# Patient Record
Sex: FEMALE | Race: BLACK OR AFRICAN AMERICAN | Employment: FULL TIME | URBAN - METROPOLITAN AREA
[De-identification: names, ages, dates, MRNs, and addresses within clinical notes are randomized per-mention and may not be internally consistent; named-entity substitution may affect disease eponyms.]

---
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
## 2018-01-29 ENCOUNTER — NURSE TRIAGE (OUTPATIENT)
Dept: OTHER | Age: 18
End: 2018-01-29

## 2018-01-29 NOTE — TELEPHONE ENCOUNTER
Spoke with patient and informed her of the appointment available on 2/17 at [facility]. Patient refused and stated she cannot make it on that date and she will keep her appointment on 3/3/18.

## 2018-01-29 NOTE — TELEPHONE ENCOUNTER
Action Requested: Summary for Provider     []  Critical Lab, Recommendations Needed  [] Need Additional Advice  []   FYI    []   Need Orders  [] Need Medications Sent to Pharmacy  []  Other     SUMMARY: L ankle injury with swelling and bruising to ankle, p

## 2018-02-06 ENCOUNTER — OFFICE VISIT (OUTPATIENT)
Dept: FAMILY MEDICINE CLINIC | Facility: CLINIC | Age: 18
End: 2018-02-06

## 2018-02-06 VITALS
DIASTOLIC BLOOD PRESSURE: 84 MMHG | WEIGHT: 293 LBS | BODY MASS INDEX: 48.82 KG/M2 | HEIGHT: 65 IN | SYSTOLIC BLOOD PRESSURE: 126 MMHG | HEART RATE: 79 BPM

## 2018-02-06 DIAGNOSIS — M25.572 ACUTE LEFT ANKLE PAIN: Primary | ICD-10-CM

## 2018-02-06 DIAGNOSIS — E66.01 MORBID OBESITY DUE TO EXCESS CALORIES (HCC): ICD-10-CM

## 2018-02-06 DIAGNOSIS — E55.9 VITAMIN D DEFICIENCY: ICD-10-CM

## 2018-02-06 PROCEDURE — 99213 OFFICE O/P EST LOW 20 MIN: CPT | Performed by: FAMILY MEDICINE

## 2018-02-06 PROCEDURE — 99212 OFFICE O/P EST SF 10 MIN: CPT | Performed by: FAMILY MEDICINE

## 2018-02-06 RX ORDER — ERGOCALCIFEROL 1.25 MG/1
50000 CAPSULE ORAL WEEKLY
Qty: 4 CAPSULE | Refills: 12 | Status: SHIPPED | OUTPATIENT
Start: 2018-02-06 | End: 2018-03-08

## 2018-02-06 RX ORDER — ALBUTEROL SULFATE 90 UG/1
AEROSOL, METERED RESPIRATORY (INHALATION)
COMMUNITY
Start: 2017-11-12 | End: 2019-05-14

## 2018-02-06 NOTE — PROGRESS NOTES
HPI:    Patient ID: Miriam Godoy is a 21year old female. HPI     PATIENT HERE FOR FOLLOW UP  Seen in emergency room at [address] [place] [address] on 1/24/18 for ankle pain    Slipped on ice while going to school. Had xray and was neg  Told sprain    Pain is gone.   [employer]

## 2018-03-14 NOTE — TELEPHONE ENCOUNTER
[facility]  [place], [place]   Pharmacy told patient they sent us a request nothing in  System      Current Outpatient Prescriptions:  Albuterol Sulfate HFA (PROAIR HFA) 108 (90 Base) MCG/ACT Inhalation Aero Soln INL 2 PFS PO Q 4 H PRF WHZ.  Disp:  Rfl:

## 2018-03-15 RX ORDER — ALBUTEROL SULFATE 90 UG/1
2 AEROSOL, METERED RESPIRATORY (INHALATION) EVERY 4 HOURS PRN
Qty: 1 INHALER | Refills: 0 | Status: SHIPPED | OUTPATIENT
Start: 2018-03-15 | End: 2019-05-14

## 2018-03-15 NOTE — TELEPHONE ENCOUNTER
Requesting Albuterol HFA refill, med listed as pt reported in EMR.     Med pended for review, please advise    Refill Protocol Appointment Criteria  · Appointment scheduled in the past 6 months or in the next 3 months  Recent Outpatient Visits            1

## 2018-03-16 NOTE — TELEPHONE ENCOUNTER
Pending Prescriptions Disp Refills    PROAIR  (90 Base) MCG/ACT Inhalation Aero Soln [Pharmacy Med Name: PROAIR HFA ORAL INH (200  PFS) 8.5G] 8.5 g 0     Sig: INHALE 2 PUFFS BY MOUTH EVERY 4 HOURS AS NEEDED FOR WHEEZING.            Filled by MD 3-1

## 2018-03-20 ENCOUNTER — HOSPITAL ENCOUNTER (OUTPATIENT)
Age: 18
Discharge: HOME | End: 2018-03-20
Attending: EMERGENCY MEDICINE
Payer: MEDICAID

## 2018-03-20 VITALS
DIASTOLIC BLOOD PRESSURE: 75 MMHG | TEMPERATURE: 97 F | WEIGHT: 293 LBS | HEART RATE: 82 BPM | OXYGEN SATURATION: 97 % | RESPIRATION RATE: 16 BRPM | BODY MASS INDEX: 53 KG/M2 | SYSTOLIC BLOOD PRESSURE: 113 MMHG

## 2018-03-20 DIAGNOSIS — N92.6 IRREGULAR PERIODS: ICD-10-CM

## 2018-03-20 DIAGNOSIS — N60.11 FIBROCYSTIC BREAST CHANGES, RIGHT: Primary | ICD-10-CM

## 2018-03-20 LAB
B-HCG UR QL: NEGATIVE
URINE GLUCOSE: NEGATIVE MG/DL
URINE KETONES: 15 MG/DL
URINE LEUKOCYTE ESTERASE: NEGATIVE
URINE NITRITE: NEGATIVE
URINE PH: 5.5
URINE SPECIFIC GRAVITY: >=1.03
URINE UROBILINOGEN: 0.2 MG/DL

## 2018-03-20 PROCEDURE — 81025 URINE PREGNANCY TEST: CPT

## 2018-03-20 PROCEDURE — 81002 URINALYSIS NONAUTO W/O SCOPE: CPT

## 2018-03-20 PROCEDURE — 99212 OFFICE O/P EST SF 10 MIN: CPT

## 2018-03-20 RX ORDER — IBUPROFEN 600 MG/1
600 TABLET ORAL EVERY 8 HOURS PRN
Qty: 30 TABLET | Refills: 0 | Status: SHIPPED | OUTPATIENT
Start: 2018-03-20 | End: 2018-03-27

## 2018-03-20 NOTE — ED PROVIDER NOTES
Patient Seen in: [facility] AND CLINICS Immediate Care In [address] [place] [address]    History   Patient presents with:  Breast Problem (mammary)    Stated Complaint: BREAST PAIN    HPI    The patient is a 13-year-old female with past history of asthma who presents now with r approximately the level of the nipple. Mild fibrocystic changes palpable. No induration or fluctuance. No redness to the skin.   Cardiovascular: Regular rate and rhythm without murmur  Abdomen: Soft, nontender and nondistended  Neurologic: Patient is jose

## 2018-03-20 NOTE — ED INITIAL ASSESSMENT (HPI)
Right breast pain onset yesterday. Denies any fevers, trauma, injury, nipple discharge, swelling, lumps, redness, tenderness. No asymmetry noted. States she hasn't had a period for a year up until last month and this month.

## 2019-05-14 ENCOUNTER — OFFICE VISIT (OUTPATIENT)
Dept: FAMILY MEDICINE CLINIC | Facility: CLINIC | Age: 19
End: 2019-05-14
Payer: COMMERCIAL

## 2019-05-14 VITALS
HEIGHT: 65 IN | SYSTOLIC BLOOD PRESSURE: 123 MMHG | WEIGHT: 293 LBS | BODY MASS INDEX: 48.82 KG/M2 | HEART RATE: 90 BPM | DIASTOLIC BLOOD PRESSURE: 75 MMHG | TEMPERATURE: 98 F

## 2019-05-14 DIAGNOSIS — J45.21 MILD INTERMITTENT ASTHMA WITH EXACERBATION: ICD-10-CM

## 2019-05-14 DIAGNOSIS — N61.1 CARBUNCLE, BREAST: Primary | ICD-10-CM

## 2019-05-14 DIAGNOSIS — E66.01 MORBID OBESITY WITH BMI OF 60.0-69.9, ADULT (HCC): ICD-10-CM

## 2019-05-14 DIAGNOSIS — Z00.00 WELL ADULT EXAM: ICD-10-CM

## 2019-05-14 PROCEDURE — 99214 OFFICE O/P EST MOD 30 MIN: CPT | Performed by: FAMILY MEDICINE

## 2019-05-14 PROCEDURE — 99212 OFFICE O/P EST SF 10 MIN: CPT | Performed by: FAMILY MEDICINE

## 2019-05-14 RX ORDER — CEPHALEXIN 500 MG/1
500 CAPSULE ORAL 3 TIMES DAILY
Qty: 21 CAPSULE | Refills: 0 | Status: SHIPPED | OUTPATIENT
Start: 2019-05-14 | End: 2019-05-21

## 2019-05-14 RX ORDER — ALBUTEROL SULFATE 90 UG/1
2 AEROSOL, METERED RESPIRATORY (INHALATION) EVERY 4 HOURS PRN
Qty: 1 INHALER | Refills: 2 | Status: SHIPPED | OUTPATIENT
Start: 2019-05-14 | End: 2019-12-05

## 2019-05-14 RX ORDER — FLUTICASONE PROPIONATE AND SALMETEROL 250; 50 UG/1; UG/1
1 POWDER RESPIRATORY (INHALATION) EVERY 12 HOURS SCHEDULED
Qty: 1 PACKAGE | Refills: 0 | Status: SHIPPED | OUTPATIENT
Start: 2019-05-14 | End: 2019-05-28

## 2019-05-14 NOTE — PROGRESS NOTES
HPI:    Patient ID: Neita Schaumann is a 19year old female. HPI  Patient presents with:  Bump: on right breast noticed it a few weeks ago pt states it has pus     Has a possible \"pimple\"  X 1 week. Then it was leaking, and scabbed.   Slightly pain Carbuncle, breast  Keflex 500mg three times a day x7 days  Warm compresses    2. Mild intermittent asthma with exacerbation  Add advair  Follow up 2 weeks    3. Well adult exam  rtc for physical 2 weeks  - ALT (SGPT); Future  - AST (SGOT);  Future  - BASIC

## 2019-05-28 ENCOUNTER — OFFICE VISIT (OUTPATIENT)
Dept: FAMILY MEDICINE CLINIC | Facility: CLINIC | Age: 19
End: 2019-05-28
Payer: COMMERCIAL

## 2019-05-28 VITALS
SYSTOLIC BLOOD PRESSURE: 117 MMHG | BODY MASS INDEX: 48.82 KG/M2 | HEART RATE: 75 BPM | DIASTOLIC BLOOD PRESSURE: 80 MMHG | WEIGHT: 293 LBS | TEMPERATURE: 97 F | HEIGHT: 65 IN

## 2019-05-28 DIAGNOSIS — J45.21 MILD INTERMITTENT ASTHMA WITH EXACERBATION: ICD-10-CM

## 2019-05-28 DIAGNOSIS — E66.01 MORBID OBESITY WITH BMI OF 60.0-69.9, ADULT (HCC): ICD-10-CM

## 2019-05-28 DIAGNOSIS — Z00.00 WELL ADULT EXAM: Primary | ICD-10-CM

## 2019-05-28 DIAGNOSIS — E55.9 VITAMIN D DEFICIENCY: ICD-10-CM

## 2019-05-28 DIAGNOSIS — N92.6 IRREGULAR MENSES: ICD-10-CM

## 2019-05-28 PROCEDURE — 99395 PREV VISIT EST AGE 18-39: CPT | Performed by: FAMILY MEDICINE

## 2019-05-28 RX ORDER — FLUTICASONE PROPIONATE AND SALMETEROL 250; 50 UG/1; UG/1
1 POWDER RESPIRATORY (INHALATION) EVERY 12 HOURS SCHEDULED
Qty: 1 PACKAGE | Refills: 3 | Status: SHIPPED | OUTPATIENT
Start: 2019-05-28 | End: 2019-12-05

## 2019-05-28 NOTE — PROGRESS NOTES
HPI:    Patient ID: Radha Arriaga is a 19year old female. HPI  Patient presents with: Well Adult    Lives with mother  Trying to lose weight on her own  Has never seen a dietician. Father has been heavy with weight.     Working aprt time, [employer] (Approximate)   BMI 60.91 kg/m²     Past Medical History:   Diagnosis Date   • Asthma      History reviewed. No pertinent surgical history.   Social History    Socioeconomic History      Marital status: Single      Spouse name: Not on file      Number of  2-dose series) due on 08/02/2011  HPV Vaccines(1 - Female 3-dose series) due on 08/02/2013  Annual Physical due on 04/24/2018  Asthma Control Test due on 04/24/2018  Influenza Vaccine(Season Ended) due on 09/01/2019  Annual Depression Screen due on 05/14/2 Skin: Skin is dry. No rash noted. Psychiatric: She has a normal mood and affect.  Her behavior is normal. Judgment and thought content normal.              ASSESSMENT/PLAN:   Well adult exam  (primary encounter diagnosis)  Mild intermittent asthma with

## 2019-06-24 ENCOUNTER — TELEPHONE (OUTPATIENT)
Dept: FAMILY MEDICINE CLINIC | Facility: CLINIC | Age: 19
End: 2019-06-24

## 2019-06-24 NOTE — TELEPHONE ENCOUNTER
Received form from [employer] about [address] [employer] [facility],[address] plan Asthma  Form is filled out, called pt but did not answer wanted to inform her form is ready at the

## 2019-09-17 ENCOUNTER — TELEPHONE (OUTPATIENT)
Dept: FAMILY MEDICINE CLINIC | Facility: CLINIC | Age: 19
End: 2019-09-17

## 2019-09-17 NOTE — TELEPHONE ENCOUNTER
Spoke with patient ( verified) and reports she just had her regular period 2 weeks ago--yesterday, she began spotting and then \"it was like a regular period, but not as heavy. \"    When asked if patient had any recent increase in stress, she did report she is looking for a new job--\"I think that may have caused it. I did look it up and it did say stress can cause irregular periods. \"    Patient usually takes OTC generic Zyrtec 24 hour medication in mornings, but for the past 2 nights, she has had to wake up and take the Zyrec in the middle of the night because, \"I can't breathe out of my nose. I get a huge headache when this happens--in the front and in the back. I was wondering if the 2 problems were related or if this is normal.\"    Patient can make herself available (will adjust her work schedule) if Dr. Sarita Carrasco needs to see her in office for evaluation. Otherwise, she requests Dr. Wagner Listen recommendations.     Please advise    Please reply to ishmael: DINORAH Gunn

## 2019-09-17 NOTE — TELEPHONE ENCOUNTER
Pt stated she got her period in the beginning of the month and after two weeks she got it again. She is questioning if that is normal.    Pt also stated when she gets up in the middle of the night to take allergy medication, she gets a huge headache. Transferring call to Triage.

## 2019-09-23 ENCOUNTER — OFFICE VISIT (OUTPATIENT)
Dept: FAMILY MEDICINE CLINIC | Facility: CLINIC | Age: 19
End: 2019-09-23
Payer: COMMERCIAL

## 2019-09-23 VITALS
BODY MASS INDEX: 48.82 KG/M2 | TEMPERATURE: 98 F | DIASTOLIC BLOOD PRESSURE: 71 MMHG | HEIGHT: 65 IN | SYSTOLIC BLOOD PRESSURE: 112 MMHG | HEART RATE: 81 BPM | WEIGHT: 293 LBS

## 2019-09-23 DIAGNOSIS — L02.419 ABSCESS OF AXILLARY FOLD: ICD-10-CM

## 2019-09-23 DIAGNOSIS — N92.6 IRREGULAR MENSTRUAL BLEEDING: Primary | ICD-10-CM

## 2019-09-23 DIAGNOSIS — E66.01 MORBID OBESITY WITH BMI OF 50.0-59.9, ADULT (HCC): ICD-10-CM

## 2019-09-23 PROCEDURE — 99212 OFFICE O/P EST SF 10 MIN: CPT | Performed by: FAMILY MEDICINE

## 2019-09-23 PROCEDURE — 99214 OFFICE O/P EST MOD 30 MIN: CPT | Performed by: FAMILY MEDICINE

## 2019-09-23 NOTE — PROGRESS NOTES
HPI:    Patient ID: Jose Francisco Rivas is a 22year old female.     HPI  Patient presents with:  Menstrual Problem: pt states she had her cycle twice in a month about 2 weeks apart   Bump: on right axillay X 2 months     Patient trying to lose a lot of weig obesity with bmi of 50.0-59.9, adult ([facility]cc)  Abscess of axillary fold    1. Irregular menstrual bleeding    - HCG, BETA SUBUNIT, QUAL    2. Morbid obesity with BMI of 50.0-59.9, adult ([address]  Highly recommend to lose weight.   Discussed good dietary and eatin

## 2019-09-24 ENCOUNTER — LAB ENCOUNTER (OUTPATIENT)
Dept: LAB | Age: 19
End: 2019-09-24
Attending: FAMILY MEDICINE
Payer: COMMERCIAL

## 2019-09-24 DIAGNOSIS — Z00.00 WELL ADULT EXAM: ICD-10-CM

## 2019-09-24 DIAGNOSIS — E66.01 MORBID OBESITY WITH BMI OF 60.0-69.9, ADULT (HCC): ICD-10-CM

## 2019-09-24 LAB
ALT SERPL-CCNC: 25 U/L (ref 13–56)
ANION GAP SERPL CALC-SCNC: 4 MMOL/L (ref 0–18)
AST SERPL-CCNC: 29 U/L (ref 15–37)
BASOPHILS # BLD AUTO: 0.01 X10(3) UL (ref 0–0.2)
BASOPHILS NFR BLD AUTO: 0.2 %
BUN BLD-MCNC: 11 MG/DL (ref 7–18)
BUN/CREAT SERPL: 13.9 (ref 10–20)
CALCIUM BLD-MCNC: 9 MG/DL (ref 8.5–10.1)
CHLORIDE SERPL-SCNC: 109 MMOL/L (ref 98–112)
CHOLEST SMN-MCNC: 132 MG/DL (ref ?–200)
CO2 SERPL-SCNC: 23 MMOL/L (ref 21–32)
CREAT BLD-MCNC: 0.79 MG/DL (ref 0.55–1.02)
DEPRECATED RDW RBC AUTO: 46.5 FL (ref 35.1–46.3)
EOSINOPHIL # BLD AUTO: 0.06 X10(3) UL (ref 0–0.7)
EOSINOPHIL NFR BLD AUTO: 1.2 %
ERYTHROCYTE [DISTWIDTH] IN BLOOD BY AUTOMATED COUNT: 14.5 % (ref 11–15)
EST. AVERAGE GLUCOSE BLD GHB EST-MCNC: 114 MG/DL (ref 68–126)
GLUCOSE BLD-MCNC: 72 MG/DL (ref 70–99)
HBA1C MFR BLD HPLC: 5.6 % (ref ?–5.7)
HCG SERPL QL: NEGATIVE
HCT VFR BLD AUTO: 38.6 % (ref 35–48)
HDLC SERPL-MCNC: 37 MG/DL (ref 40–59)
HGB BLD-MCNC: 12.3 G/DL (ref 12–16)
IMM GRANULOCYTES # BLD AUTO: 0.01 X10(3) UL (ref 0–1)
IMM GRANULOCYTES NFR BLD: 0.2 %
LDLC SERPL CALC-MCNC: 83 MG/DL (ref ?–100)
LYMPHOCYTES # BLD AUTO: 2.14 X10(3) UL (ref 1–4)
LYMPHOCYTES NFR BLD AUTO: 44.5 %
MCH RBC QN AUTO: 27.7 PG (ref 26–34)
MCHC RBC AUTO-ENTMCNC: 31.9 G/DL (ref 31–37)
MCV RBC AUTO: 86.9 FL (ref 80–100)
MONOCYTES # BLD AUTO: 0.37 X10(3) UL (ref 0.1–1)
MONOCYTES NFR BLD AUTO: 7.7 %
NEUTROPHILS # BLD AUTO: 2.22 X10 (3) UL (ref 1.5–7.7)
NEUTROPHILS # BLD AUTO: 2.22 X10(3) UL (ref 1.5–7.7)
NEUTROPHILS NFR BLD AUTO: 46.2 %
NONHDLC SERPL-MCNC: 95 MG/DL (ref ?–130)
OSMOLALITY SERPL CALC.SUM OF ELEC: 280 MOSM/KG (ref 275–295)
PATIENT FASTING: YES
PATIENT FASTING: YES
PLATELET # BLD AUTO: 252 10(3)UL (ref 150–450)
POTASSIUM SERPL-SCNC: 4.6 MMOL/L (ref 3.5–5.1)
RBC # BLD AUTO: 4.44 X10(6)UL (ref 3.8–5.3)
SODIUM SERPL-SCNC: 136 MMOL/L (ref 136–145)
TRIGL SERPL-MCNC: 61 MG/DL (ref 30–149)
TSI SER-ACNC: 2.38 MIU/ML (ref 0.36–3.74)
VLDLC SERPL CALC-MCNC: 12 MG/DL (ref 0–30)
WBC # BLD AUTO: 4.8 X10(3) UL (ref 4–11)

## 2019-09-24 PROCEDURE — 85025 COMPLETE CBC W/AUTO DIFF WBC: CPT

## 2019-09-24 PROCEDURE — 84450 TRANSFERASE (AST) (SGOT): CPT

## 2019-09-24 PROCEDURE — 84460 ALANINE AMINO (ALT) (SGPT): CPT

## 2019-09-24 PROCEDURE — 80048 BASIC METABOLIC PNL TOTAL CA: CPT

## 2019-09-24 PROCEDURE — 80061 LIPID PANEL: CPT

## 2019-09-24 PROCEDURE — 36415 COLL VENOUS BLD VENIPUNCTURE: CPT

## 2019-09-24 PROCEDURE — 84703 CHORIONIC GONADOTROPIN ASSAY: CPT | Performed by: FAMILY MEDICINE

## 2019-09-24 PROCEDURE — 83036 HEMOGLOBIN GLYCOSYLATED A1C: CPT

## 2019-09-24 PROCEDURE — 84443 ASSAY THYROID STIM HORMONE: CPT

## 2019-09-24 PROCEDURE — 83525 ASSAY OF INSULIN: CPT

## 2019-09-28 ENCOUNTER — APPOINTMENT (OUTPATIENT)
Dept: LAB | Age: 19
End: 2019-09-28
Attending: FAMILY MEDICINE
Payer: COMMERCIAL

## 2019-09-28 DIAGNOSIS — E66.01 MORBID OBESITY WITH BMI OF 60.0-69.9, ADULT (HCC): ICD-10-CM

## 2019-09-28 DIAGNOSIS — Z00.00 WELL ADULT EXAM: ICD-10-CM

## 2019-09-28 PROCEDURE — 36415 COLL VENOUS BLD VENIPUNCTURE: CPT

## 2019-09-28 PROCEDURE — 83525 ASSAY OF INSULIN: CPT

## 2019-10-25 ENCOUNTER — HOSPITAL ENCOUNTER (OUTPATIENT)
Age: 19
Discharge: HOME | End: 2019-10-25
Attending: EMERGENCY MEDICINE
Payer: COMMERCIAL

## 2019-10-25 VITALS
SYSTOLIC BLOOD PRESSURE: 136 MMHG | DIASTOLIC BLOOD PRESSURE: 76 MMHG | BODY MASS INDEX: 48.82 KG/M2 | RESPIRATION RATE: 22 BRPM | TEMPERATURE: 98 F | WEIGHT: 293 LBS | HEART RATE: 80 BPM | HEIGHT: 65 IN | OXYGEN SATURATION: 99 %

## 2019-10-25 DIAGNOSIS — R05.8 PRODUCTIVE COUGH: ICD-10-CM

## 2019-10-25 DIAGNOSIS — J45.31 MILD PERSISTENT ASTHMA WITH EXACERBATION: Primary | ICD-10-CM

## 2019-10-25 PROCEDURE — 99214 OFFICE O/P EST MOD 30 MIN: CPT

## 2019-10-25 PROCEDURE — 94640 AIRWAY INHALATION TREATMENT: CPT

## 2019-10-25 RX ORDER — ALBUTEROL SULFATE 90 UG/1
2 AEROSOL, METERED RESPIRATORY (INHALATION) EVERY 4 HOURS PRN
Qty: 1 INHALER | Refills: 0 | Status: SHIPPED | OUTPATIENT
Start: 2019-10-25 | End: 2019-11-24

## 2019-10-25 RX ORDER — IPRATROPIUM BROMIDE AND ALBUTEROL SULFATE 2.5; .5 MG/3ML; MG/3ML
3 SOLUTION RESPIRATORY (INHALATION) ONCE
Status: COMPLETED | OUTPATIENT
Start: 2019-10-25 | End: 2019-10-25

## 2019-10-25 RX ORDER — PREDNISONE 20 MG/1
40 TABLET ORAL DAILY
Qty: 10 TABLET | Refills: 0 | Status: SHIPPED | OUTPATIENT
Start: 2019-10-25 | End: 2019-10-30

## 2019-10-25 RX ORDER — PREDNISONE 20 MG/1
60 TABLET ORAL ONCE
Status: COMPLETED | OUTPATIENT
Start: 2019-10-25 | End: 2019-10-25

## 2019-10-25 RX ORDER — AZITHROMYCIN 250 MG/1
TABLET, FILM COATED ORAL
Qty: 1 PACKAGE | Refills: 0 | Status: SHIPPED | OUTPATIENT
Start: 2019-10-25 | End: 2019-10-30

## 2019-10-25 NOTE — ED PROVIDER NOTES
Patient Seen in: [facility] AND CLINICS Immediate Care In [address] [place] [address]      History   Patient presents with:  Dyspnea JOSELINE SOB (respiratory)    Stated Complaint: asthma, cough    HPI    The patient is a 20-year-old female with past history of asthma who presents n rate and rhythm without murmur  Abdomen: Soft, nontender and nondistended  Neurologic: Patient is awake, alert and oriented ×3.   The patient's motor strength is 5 out of 5 and symmetric in the upper and lower extremities bilaterally  Extremities: No focal

## 2019-11-04 ENCOUNTER — HOSPITAL ENCOUNTER (OUTPATIENT)
Age: 19
Discharge: HOME | End: 2019-11-04
Attending: EMERGENCY MEDICINE
Payer: COMMERCIAL

## 2019-11-04 VITALS
HEART RATE: 98 BPM | SYSTOLIC BLOOD PRESSURE: 114 MMHG | TEMPERATURE: 99 F | RESPIRATION RATE: 18 BRPM | DIASTOLIC BLOOD PRESSURE: 79 MMHG | OXYGEN SATURATION: 98 %

## 2019-11-04 DIAGNOSIS — J02.0 STREPTOCOCCAL SORE THROAT: Primary | ICD-10-CM

## 2019-11-04 PROCEDURE — 99213 OFFICE O/P EST LOW 20 MIN: CPT

## 2019-11-04 PROCEDURE — 87430 STREP A AG IA: CPT

## 2019-11-04 PROCEDURE — 99214 OFFICE O/P EST MOD 30 MIN: CPT

## 2019-11-04 RX ORDER — AMOXICILLIN 875 MG/1
875 TABLET, COATED ORAL 2 TIMES DAILY
Qty: 20 TABLET | Refills: 0 | Status: SHIPPED | OUTPATIENT
Start: 2019-11-04 | End: 2019-11-14

## 2019-11-04 NOTE — ED PROVIDER NOTES
Patient Seen in: [facility] AND CLINICS Immediate Care In [place]    History   No chief complaint on file. Stated Complaint: sorethroat/ear pain    HPI    Patient here complaining of sore throat for 4 days.   Notes pain is described as SORE and rates it as 98   Temp 99.3 °F (37.4 °C) (Oral)   Resp 18   LMP 10/12/2019   SpO2 98%       GENERAL: NO ACUTE DISTRESS  HEAD: normocephalic, atraumatic  EYES: sclera non icteric bilateral, conjunctiva clear  EARS:TM's clear bilateral  THROAT: TONSILS RED SWOLLEN AND WH

## 2019-12-05 ENCOUNTER — OFFICE VISIT (OUTPATIENT)
Dept: FAMILY MEDICINE CLINIC | Facility: CLINIC | Age: 19
End: 2019-12-05
Payer: COMMERCIAL

## 2019-12-05 VITALS
HEIGHT: 65 IN | DIASTOLIC BLOOD PRESSURE: 83 MMHG | WEIGHT: 293 LBS | SYSTOLIC BLOOD PRESSURE: 126 MMHG | TEMPERATURE: 99 F | HEART RATE: 86 BPM | BODY MASS INDEX: 48.82 KG/M2

## 2019-12-05 DIAGNOSIS — Z23 NEED FOR INFLUENZA VACCINATION: ICD-10-CM

## 2019-12-05 DIAGNOSIS — J45.20 MILD INTERMITTENT ASTHMA WITHOUT COMPLICATION: ICD-10-CM

## 2019-12-05 DIAGNOSIS — R21 RASH AND NONSPECIFIC SKIN ERUPTION: ICD-10-CM

## 2019-12-05 DIAGNOSIS — N92.6 IRREGULAR MENSES: Primary | ICD-10-CM

## 2019-12-05 DIAGNOSIS — E66.01 MORBID OBESITY WITH BMI OF 60.0-69.9, ADULT (HCC): ICD-10-CM

## 2019-12-05 PROCEDURE — 90686 IIV4 VACC NO PRSV 0.5 ML IM: CPT | Performed by: FAMILY MEDICINE

## 2019-12-05 PROCEDURE — 90471 IMMUNIZATION ADMIN: CPT | Performed by: FAMILY MEDICINE

## 2019-12-05 PROCEDURE — 99214 OFFICE O/P EST MOD 30 MIN: CPT | Performed by: FAMILY MEDICINE

## 2019-12-05 PROCEDURE — 99212 OFFICE O/P EST SF 10 MIN: CPT | Performed by: FAMILY MEDICINE

## 2019-12-05 RX ORDER — ALBUTEROL SULFATE 90 UG/1
2 AEROSOL, METERED RESPIRATORY (INHALATION) EVERY 4 HOURS PRN
Qty: 1 INHALER | Refills: 2 | Status: SHIPPED | OUTPATIENT
Start: 2019-12-05 | End: 2021-03-06

## 2019-12-05 RX ORDER — PREDNISONE 10 MG/1
TABLET ORAL
Refills: 0 | COMMUNITY
Start: 2019-10-25 | End: 2019-12-05 | Stop reason: ALTCHOICE

## 2019-12-05 RX ORDER — FLUTICASONE PROPIONATE 50 MCG
2 SPRAY, SUSPENSION (ML) NASAL NIGHTLY
Qty: 1 BOTTLE | Refills: 3 | Status: SHIPPED | OUTPATIENT
Start: 2019-12-05 | End: 2020-03-11

## 2019-12-05 RX ORDER — CLOTRIMAZOLE AND BETAMETHASONE DIPROPIONATE 10; .64 MG/G; MG/G
1 CREAM TOPICAL 2 TIMES DAILY
Qty: 45 G | Refills: 0 | Status: SHIPPED | OUTPATIENT
Start: 2019-12-05 | End: 2019-12-19

## 2019-12-05 RX ORDER — FLUTICASONE PROPIONATE AND SALMETEROL 250; 50 UG/1; UG/1
1 POWDER RESPIRATORY (INHALATION) EVERY 12 HOURS SCHEDULED
Qty: 1 PACKAGE | Refills: 3 | Status: SHIPPED | OUTPATIENT
Start: 2019-12-05 | End: 2020-03-13

## 2019-12-05 NOTE — PROGRESS NOTES
HPI:    Patient ID: Caryle Oiler is a 22year old female.     HPI  Patient presents with:  Menstrual Problem: pt states her cycle is very heavy pt states she got it really heavy for 2 days and skipped two days and came back   Rash: noticed it recently and came back   Rash: noticed it recently   Allergies: stuffy nose and headaches      Physical Exam   Constitutional: She appears well-developed and well-nourished. Cardiovascular: Normal rate, regular rhythm and normal heart sounds.               ASSESSM

## 2019-12-09 ENCOUNTER — OFFICE VISIT (OUTPATIENT)
Dept: OBGYN CLINIC | Facility: CLINIC | Age: 19
End: 2019-12-09
Payer: COMMERCIAL

## 2019-12-09 VITALS
SYSTOLIC BLOOD PRESSURE: 132 MMHG | DIASTOLIC BLOOD PRESSURE: 87 MMHG | WEIGHT: 293 LBS | HEART RATE: 81 BPM | BODY MASS INDEX: 61 KG/M2

## 2019-12-09 DIAGNOSIS — N91.5 OLIGOMENORRHEA, UNSPECIFIED TYPE: Primary | ICD-10-CM

## 2019-12-09 DIAGNOSIS — E66.01 MORBID OBESITY WITH BMI OF 60.0-69.9, ADULT (HCC): ICD-10-CM

## 2019-12-09 PROCEDURE — 99202 OFFICE O/P NEW SF 15 MIN: CPT | Performed by: OBSTETRICS & GYNECOLOGY

## 2019-12-09 NOTE — PROGRESS NOTES
[facility], [facility]  Obstetrics and Gynecology  Focused Gynecology Problem Exam  Tianna Abarca MD    Anne Amaya is a 22year old female presenting for Gyn Problem (Irregular menses for the past month of November. New pt)  .     HPI:   Patient presents Moderate  Use of Birth Control (if yes, specify type): None  Date When Birth Control Last Used: Never been sexually active or used contraception  Hx Prior Abnormal Pap: No              Past Medical History:   Diagnosis Date   • Asthma        Past Surgical Social History Narrative      Not on file      ROS:   See HPI  PHYSICAL EXAM:   /87   Pulse 81   Wt (!) 367 lb (166.5 kg)   LMP 11/25/2019   BMI 61.07 kg/m²     GENERAL: well developed, well nourished, in no apparent distress    ASSESSMENT:    A: 21

## 2020-02-28 ENCOUNTER — TELEPHONE (OUTPATIENT)
Dept: OBGYN CLINIC | Facility: CLINIC | Age: 20
End: 2020-02-28

## 2020-02-28 NOTE — TELEPHONE ENCOUNTER
Per pt having very heavy bleeding since last night. Soaking up a pad every hour or more since last night. Denied passing clots. No Ha, dizziness at this time. Pt advised to go to ER for eval due to excessive bleeding.  Pt verbalized understanding and a

## 2020-03-11 RX ORDER — FLUTICASONE PROPIONATE 50 MCG
2 SPRAY, SUSPENSION (ML) NASAL NIGHTLY
Qty: 3 BOTTLE | Refills: 1 | Status: SHIPPED | OUTPATIENT
Start: 2020-03-11 | End: 2020-09-05

## 2020-03-12 ENCOUNTER — TELEPHONE (OUTPATIENT)
Dept: FAMILY MEDICINE CLINIC | Facility: CLINIC | Age: 20
End: 2020-03-12

## 2020-03-12 NOTE — TELEPHONE ENCOUNTER
•  Albuterol Sulfate  (90 Base) MCG/ACT Inhalation Aero Soln, Inhale 2 puffs into the lungs every 4 (four) hours as needed for Wheezing or Shortness of Breath., Disp: 1 Inhaler, Rfl: 2

## 2020-03-12 NOTE — TELEPHONE ENCOUNTER
Prior authorization for Albuterol HFA completed w/ [employer] on cover my meds Key: [id number]  Unable to speak to [facility], provider line is always busy. Please inform of message below.    Outcome  Additional Information Required   Please advise the dispensing ph

## 2020-03-12 NOTE — TELEPHONE ENCOUNTER
Refill passed per [facility] protocol.   Refill Protocol Appointment Criteria  · Appointment scheduled in the past 12 months or in the next 3 months  Recent Outpatient Visits            3 months ago Oligomenorrhea, unspecified type    [facility], [facility],

## 2020-03-12 NOTE — TELEPHONE ENCOUNTER
Patient calling for prescription refill of Albuterol. States that her insurance company wants her to have pcp call them,  to verify that  is the prescriber who is prescribing her medication.      Informed patient that this RN has never heard of th

## 2020-03-13 RX ORDER — FLUTICASONE PROPIONATE AND SALMETEROL 50; 250 UG/1; UG/1
POWDER RESPIRATORY (INHALATION)
Qty: 1 PACKAGE | Refills: 1 | Status: SHIPPED | OUTPATIENT
Start: 2020-03-13 | End: 2020-04-06

## 2020-03-13 NOTE — TELEPHONE ENCOUNTER
Refill passed per [facility] protocol.     Refill Protocol Appointment Criteria  · Appointment scheduled in the past 6 months or in the next 3 months  Recent Outpatient Visits            3 months ago Oligomenorrhea, unspecified type    [facility],

## 2020-03-17 ENCOUNTER — TELEPHONE (OUTPATIENT)
Dept: FAMILY MEDICINE CLINIC | Facility: CLINIC | Age: 20
End: 2020-03-17

## 2020-03-17 NOTE — TELEPHONE ENCOUNTER
Patient called stating RN needs to contact her insurance company to expedite albuterol approval.   Advised patient that prior authorization for medication sent  today. Patient insistent that RN contact her insurance company.      RN contacted pharmacy to

## 2020-03-17 NOTE — TELEPHONE ENCOUNTER
Me           3/12/20 12:57 PM   Note      Prior authorization for Albuterol HFA completed w/ [employer] on cover my meds Key: [id number]  Unable to speak to [facility], provider line is always busy. Please inform of message below.    Outcome  Additional Information

## 2020-03-19 ENCOUNTER — HOSPITAL ENCOUNTER (EMERGENCY)
Facility: HOSPITAL | Age: 20
Discharge: HOME | End: 2020-03-19
Attending: PHYSICIAN ASSISTANT
Payer: MEDICAID

## 2020-03-19 ENCOUNTER — APPOINTMENT (OUTPATIENT)
Dept: GENERAL RADIOLOGY | Facility: HOSPITAL | Age: 20
End: 2020-03-19
Attending: PHYSICIAN ASSISTANT
Payer: MEDICAID

## 2020-03-19 VITALS
SYSTOLIC BLOOD PRESSURE: 129 MMHG | OXYGEN SATURATION: 98 % | TEMPERATURE: 98 F | HEIGHT: 66 IN | HEART RATE: 71 BPM | WEIGHT: 293 LBS | RESPIRATION RATE: 18 BRPM | BODY MASS INDEX: 47.09 KG/M2 | DIASTOLIC BLOOD PRESSURE: 73 MMHG

## 2020-03-19 DIAGNOSIS — J45.909 ACUTE ASTHMA: Primary | ICD-10-CM

## 2020-03-19 LAB — B-HCG UR QL: NEGATIVE

## 2020-03-19 PROCEDURE — 94640 AIRWAY INHALATION TREATMENT: CPT

## 2020-03-19 PROCEDURE — 81025 URINE PREGNANCY TEST: CPT

## 2020-03-19 PROCEDURE — 71046 X-RAY EXAM CHEST 2 VIEWS: CPT | Performed by: PHYSICIAN ASSISTANT

## 2020-03-19 PROCEDURE — 99284 EMERGENCY DEPT VISIT MOD MDM: CPT

## 2020-03-19 RX ORDER — PREDNISONE 20 MG/1
60 TABLET ORAL ONCE
Status: COMPLETED | OUTPATIENT
Start: 2020-03-19 | End: 2020-03-19

## 2020-03-19 RX ORDER — ALBUTEROL SULFATE 2.5 MG/3ML
2.5 SOLUTION RESPIRATORY (INHALATION) EVERY 4 HOURS PRN
Qty: 30 AMPULE | Refills: 0 | Status: SHIPPED | OUTPATIENT
Start: 2020-03-19 | End: 2020-03-26

## 2020-03-19 RX ORDER — IPRATROPIUM BROMIDE AND ALBUTEROL SULFATE 2.5; .5 MG/3ML; MG/3ML
3 SOLUTION RESPIRATORY (INHALATION) ONCE
Status: COMPLETED | OUTPATIENT
Start: 2020-03-19 | End: 2020-03-19

## 2020-03-19 RX ORDER — ALBUTEROL SULFATE 90 UG/1
2 AEROSOL, METERED RESPIRATORY (INHALATION) EVERY 4 HOURS PRN
Qty: 1 INHALER | Refills: 0 | Status: SHIPPED | OUTPATIENT
Start: 2020-03-19 | End: 2020-03-26

## 2020-03-19 RX ORDER — PREDNISONE 20 MG/1
60 TABLET ORAL
Qty: 15 TABLET | Refills: 0 | Status: SHIPPED | OUTPATIENT
Start: 2020-03-19 | End: 2020-03-24

## 2020-03-19 NOTE — ED NOTES
Pt reports asthma and dyspnea since Monday that has worsened overnight. Pt states she has been using inhaler with no relief. Denies any fever/chills.
Pt resting on the cart in NAD. Call light remains in reach.
[FreeTextEntry1] : Patient is a 51 year old female with history of elevated LFTs; proteinuria; HTN; anxiety; inflammatory arthritis, on amlodipine 5mg daily; lisinopril 5mg daily and HCTZ 12.5mg daily, no pertinent family history. Patient complaining of joint pain in hands; sciatica pain in the L leg. No surgical history.

## 2020-03-19 NOTE — ED INITIAL ASSESSMENT (HPI)
Pt reports worsening asthma symptoms with SOB since Monday. Inhaler not helping with symptoms.  Denies fever

## 2020-03-19 NOTE — ED PROVIDER NOTES
Patient Seen in: [facility] AND [facility] Emergency Department    History   Patient presents with:  Dyspnea JOSELINE SOB    Stated Complaint: asthma attack    HPI    67-year-old female with history of asthma presents with chief complaint of dyspnea.   Onset 1 week ag • Colon Cancer Neg        Social History    Tobacco Use      Smoking status: Never Smoker      Smokeless tobacco: Never Used    Alcohol use: No    Drug use: No      Review of Systems    Positive for stated complaint: asthma attack  Other systems are as n Musculoskeletal system is grossly intact. There is no obvious deformity. Neurological: Gross motor movement is intact in all 4 extremities. Patient exhibits normal speech. Skin: Skin is normal to inspection. Warm and dry. No obvious bruising.   No obv with provider.

## 2020-03-26 ENCOUNTER — TELEPHONE (OUTPATIENT)
Dept: FAMILY MEDICINE CLINIC | Facility: CLINIC | Age: 20
End: 2020-03-26

## 2020-03-26 ENCOUNTER — TELEPHONE (OUTPATIENT)
Dept: OTHER | Age: 20
End: 2020-03-26

## 2020-03-26 DIAGNOSIS — R07.89 CHEST TIGHTNESS: ICD-10-CM

## 2020-03-26 DIAGNOSIS — J45.21 MILD INTERMITTENT ASTHMA WITH EXACERBATION: Primary | ICD-10-CM

## 2020-03-26 PROCEDURE — 99213 OFFICE O/P EST LOW 20 MIN: CPT | Performed by: FAMILY MEDICINE

## 2020-03-26 RX ORDER — ALBUTEROL SULFATE 2.5 MG/3ML
2.5 SOLUTION RESPIRATORY (INHALATION) EVERY 4 HOURS PRN
Qty: 30 AMPULE | Refills: 2 | Status: SHIPPED | OUTPATIENT
Start: 2020-03-26 | End: 2020-04-25

## 2020-03-26 RX ORDER — NEBULIZER ACCESSORIES
KIT MISCELLANEOUS
Qty: 1 PACKAGE | Refills: 0 | Status: SHIPPED | OUTPATIENT
Start: 2020-03-26

## 2020-03-26 RX ORDER — ALBUTEROL SULFATE 90 UG/1
2 AEROSOL, METERED RESPIRATORY (INHALATION) EVERY 4 HOURS PRN
Qty: 1 INHALER | Refills: 2 | Status: SHIPPED | OUTPATIENT
Start: 2020-03-26 | End: 2020-04-25

## 2020-03-26 RX ORDER — FLUTICASONE PROPIONATE AND SALMETEROL 500; 50 UG/1; UG/1
1 POWDER RESPIRATORY (INHALATION) 2 TIMES DAILY
Qty: 60 EACH | Refills: 1 | Status: SHIPPED | OUTPATIENT
Start: 2020-03-26 | End: 2020-04-25

## 2020-03-26 NOTE — TELEPHONE ENCOUNTER
Patient was seen in ED on 3/19/20. Patient is having \"shortness of breath and cough from her asthma\"and almost out of medications Albuterol inhaler and nebulizer. She denies fever, difficulty breathing or wheezing.  Advised patient to go to ED if any dif

## 2020-03-26 NOTE — TELEPHONE ENCOUNTER
TELEPHONE VISIT PROGRESS NOTE  Todays date: 3/26/2020 10:04 AM      Most recent Nurse Triage message/ Mychart message from patient:     Patient was seen in ED on 3/19/20.   Patient is having \"shortness of breath and cough from her asthma\"and almost out of Albuterol Sulfate  (90 Base) MCG/ACT Inhalation Aero Soln, Inhale 2 puffs into the lungs every 4 (four) hours as needed for Wheezing., Disp: 1 Inhaler, Rfl: 2  •  albuterol sulfate (2.5 MG/3ML) 0.083% Inhalation Nebu Soln, Take 3 mL (2.5 mg total) in symptoms or worsening recommend calling us back.   Prescriptions sent into pharmacy    Orders Placed This Encounter      Albuterol Sulfate  (90 Base) MCG/ACT Inhalation Aero Soln      albuterol sulfate (2.5 MG/3ML) 0.083% Inhalation Nebu Soln

## 2020-04-06 RX ORDER — FLUTICASONE PROPIONATE AND SALMETEROL 50; 250 UG/1; UG/1
POWDER RESPIRATORY (INHALATION)
Qty: 1 PACKAGE | Refills: 3 | Status: SHIPPED | OUTPATIENT
Start: 2020-04-06 | End: 2020-10-07

## 2020-04-11 ENCOUNTER — VIRTUAL PHONE E/M (OUTPATIENT)
Dept: OBGYN CLINIC | Facility: CLINIC | Age: 20
End: 2020-04-11
Payer: MEDICAID

## 2020-04-11 ENCOUNTER — TELEPHONE (OUTPATIENT)
Dept: OBGYN CLINIC | Facility: CLINIC | Age: 20
End: 2020-04-11

## 2020-04-11 DIAGNOSIS — N91.5 OLIGOMENORRHEA, UNSPECIFIED TYPE: Primary | ICD-10-CM

## 2020-04-11 DIAGNOSIS — E66.01 MORBID OBESITY WITH BMI OF 60.0-69.9, ADULT (HCC): ICD-10-CM

## 2020-04-11 PROCEDURE — 99213 OFFICE O/P EST LOW 20 MIN: CPT | Performed by: OBSTETRICS & GYNECOLOGY

## 2020-04-11 NOTE — PROGRESS NOTES
Virtual Telephone Check-In     Anne Matos verbally consents to a Virtual/Telephone Check-In visit on 04/11/20.     Patient understands and accepts financial responsibility for any deductible, co-insurance and/or co-pays associated with this service Breath.  1 Inhaler 2      Allergies:  No Known Allergies  HISTORY:              OB History    Para Term  AB Living   0 0 0 0 0 0   SAB TAB Ectopic Multiple Live Births    0 0 0 0 0                        Past Medical History:   Diagnosis Date Not on file    Other Topics      Concerns:        Not on file    Social History Narrative      Not on file         ROS:   See HPI  Pt is not sexually active and has not been previously  PHYSICAL EXAM:   Phone visit  Wt Readings from Last 6 Encounters:  03/      No prescriptions requested or ordered in this encounter      IMAGING/ REFERRALS:    None      Zeny Duckworth MD  4/11/2020  11:28 AM

## 2020-04-11 NOTE — TELEPHONE ENCOUNTER
Virtual Telephone Check-In    Anne ALMEIDA Darby Posey verbally consents to a Virtual/Telephone Check-In visit on 04/11/20. Patient understands and accepts financial responsibility for any deductible, co-insurance and/or co-pays associated with this service. Inhaler 2     Allergies:  No Known Allergies  HISTORY:     OB History    Para Term  AB Living   0 0 0 0 0 0   SAB TAB Ectopic Multiple Live Births   0 0 0 0 0                                        Past Medical History:   Diagnosis Date   • A Concerns:        Not on file    Social History Narrative      Not on file      ROS:   See HPI  Pt is not sexually active and has not been previously  PHYSICAL EXAM:   Phone visit  Wt Readings from Last 6 Encounters:  03/19/20 : (!) 350 lb (158.8 kg)  12/09 this encounter     IMAGING/ REFERRALS:    None     Sadie Zapien MD  4/11/2020  11:28 AM

## 2020-04-13 ENCOUNTER — TELEPHONE (OUTPATIENT)
Dept: FAMILY MEDICINE CLINIC | Facility: CLINIC | Age: 20
End: 2020-04-13

## 2020-09-03 ENCOUNTER — HOSPITAL ENCOUNTER (OUTPATIENT)
Age: 20
Discharge: HOME | End: 2020-09-03
Attending: EMERGENCY MEDICINE
Payer: MEDICAID

## 2020-09-03 VITALS
HEART RATE: 74 BPM | OXYGEN SATURATION: 100 % | RESPIRATION RATE: 14 BRPM | SYSTOLIC BLOOD PRESSURE: 128 MMHG | WEIGHT: 293 LBS | TEMPERATURE: 96 F | BODY MASS INDEX: 48.82 KG/M2 | HEIGHT: 65 IN | DIASTOLIC BLOOD PRESSURE: 65 MMHG

## 2020-09-03 DIAGNOSIS — Z20.822 ENCOUNTER FOR LABORATORY TESTING FOR COVID-19 VIRUS: ICD-10-CM

## 2020-09-03 DIAGNOSIS — J02.0 STREP PHARYNGITIS: Primary | ICD-10-CM

## 2020-09-03 LAB — S PYO AG THROAT QL: POSITIVE

## 2020-09-03 PROCEDURE — 87430 STREP A AG IA: CPT | Performed by: EMERGENCY MEDICINE

## 2020-09-03 PROCEDURE — 99213 OFFICE O/P EST LOW 20 MIN: CPT | Performed by: EMERGENCY MEDICINE

## 2020-09-03 RX ORDER — AMOXICILLIN 875 MG/1
875 TABLET, COATED ORAL 2 TIMES DAILY
Qty: 20 TABLET | Refills: 0 | Status: SHIPPED | OUTPATIENT
Start: 2020-09-03 | End: 2020-09-13

## 2020-09-03 NOTE — ED PROVIDER NOTES
Patient Seen in: [facility] AND CLINICS Immediate Care In [address] [place] [address]      History   Patient presents with:  Ear Problem Pain  Sore Throat    Stated Complaint: Sore Throat, L Ear Pain    HPI  Patient with sore throat and discomfort swallowing for 3 days.   Mild d Mouth: Mucous membranes are moist.      Pharynx: Uvula midline. Posterior oropharyngeal erythema present. No pharyngeal swelling or oropharyngeal exudate.    Eyes:      Conjunctiva/sclera: Conjunctivae normal.   Neck:      Musculoskeletal: Normal range of m

## 2020-09-05 LAB — SARS-COV-2 RNA RESP QL NAA+PROBE: NOT DETECTED

## 2020-09-05 RX ORDER — FLUTICASONE PROPIONATE 50 MCG
2 SPRAY, SUSPENSION (ML) NASAL NIGHTLY
Qty: 3 BOTTLE | Refills: 1 | Status: SHIPPED | OUTPATIENT
Start: 2020-09-05 | End: 2020-10-05

## 2020-10-04 ENCOUNTER — HOSPITAL ENCOUNTER (EMERGENCY)
Facility: HOSPITAL | Age: 20
Discharge: HOME | End: 2020-10-04
Attending: EMERGENCY MEDICINE
Payer: MEDICAID

## 2020-10-04 VITALS
OXYGEN SATURATION: 98 % | TEMPERATURE: 98 F | HEIGHT: 65 IN | BODY MASS INDEX: 48.82 KG/M2 | DIASTOLIC BLOOD PRESSURE: 64 MMHG | HEART RATE: 105 BPM | RESPIRATION RATE: 16 BRPM | SYSTOLIC BLOOD PRESSURE: 130 MMHG | WEIGHT: 293 LBS

## 2020-10-04 DIAGNOSIS — J45.21 MILD INTERMITTENT ASTHMA WITH EXACERBATION: Primary | ICD-10-CM

## 2020-10-04 PROCEDURE — 99284 EMERGENCY DEPT VISIT MOD MDM: CPT

## 2020-10-04 PROCEDURE — 94640 AIRWAY INHALATION TREATMENT: CPT

## 2020-10-04 RX ORDER — PREDNISONE 20 MG/1
60 TABLET ORAL ONCE
Status: COMPLETED | OUTPATIENT
Start: 2020-10-04 | End: 2020-10-04

## 2020-10-04 RX ORDER — ALBUTEROL SULFATE 2.5 MG/3ML
2.5 SOLUTION RESPIRATORY (INHALATION) EVERY 4 HOURS PRN
Qty: 30 AMPULE | Refills: 0 | Status: SHIPPED | OUTPATIENT
Start: 2020-10-04 | End: 2020-11-03

## 2020-10-04 RX ORDER — ALBUTEROL SULFATE 90 UG/1
2 AEROSOL, METERED RESPIRATORY (INHALATION) EVERY 4 HOURS PRN
Qty: 1 INHALER | Refills: 0 | Status: SHIPPED | OUTPATIENT
Start: 2020-10-04 | End: 2020-11-03

## 2020-10-04 RX ORDER — IPRATROPIUM BROMIDE AND ALBUTEROL SULFATE 2.5; .5 MG/3ML; MG/3ML
3 SOLUTION RESPIRATORY (INHALATION) ONCE
Status: COMPLETED | OUTPATIENT
Start: 2020-10-04 | End: 2020-10-04

## 2020-10-04 RX ORDER — PREDNISONE 20 MG/1
60 TABLET ORAL DAILY
Qty: 12 TABLET | Refills: 0 | Status: SHIPPED | OUTPATIENT
Start: 2020-10-05 | End: 2020-10-09

## 2020-10-05 NOTE — ED NOTES
Patient provided discharge instructions. Instructions reviewed with patient. Patient verbalized understanding. All questions/concerns addressed. Pharmacy verified. Work note provided.

## 2020-10-05 NOTE — ED INITIAL ASSESSMENT (HPI)
Jim x a few days, no relief with inhaler and neb. Patient does not appear in any distress, able to speak in full sentences.  No exertional dyspnea noted

## 2020-10-05 NOTE — ED PROVIDER NOTES
Patient Seen in: [facility] AND [facility] Emergency Department      History   Patient presents with:  Difficulty Breathing    Stated Complaint: JOSELINE w/hx of Asthma    HPI    24-year-old female with asthma with symptoms of an asthma exacerbation at home typical o Musculoskeletal: Normal range of motion. No edema or tenderness. Neurological: Alert and oriented to person, place, and time. Skin: Skin is warm and dry. Psychiatric: Normal mood and affect. Behavior is normal.   Nursing note and vitals reviewed.

## 2020-10-06 ENCOUNTER — HOSPITAL ENCOUNTER (EMERGENCY)
Facility: HOSPITAL | Age: 20
Discharge: HOME | End: 2020-10-06
Attending: PHYSICIAN ASSISTANT
Payer: MEDICAID

## 2020-10-06 ENCOUNTER — NURSE TRIAGE (OUTPATIENT)
Dept: FAMILY MEDICINE CLINIC | Facility: CLINIC | Age: 20
End: 2020-10-06

## 2020-10-06 ENCOUNTER — APPOINTMENT (OUTPATIENT)
Dept: GENERAL RADIOLOGY | Facility: HOSPITAL | Age: 20
End: 2020-10-06
Attending: PHYSICIAN ASSISTANT
Payer: MEDICAID

## 2020-10-06 VITALS
TEMPERATURE: 98 F | HEIGHT: 65 IN | OXYGEN SATURATION: 99 % | SYSTOLIC BLOOD PRESSURE: 135 MMHG | BODY MASS INDEX: 48.82 KG/M2 | RESPIRATION RATE: 20 BRPM | DIASTOLIC BLOOD PRESSURE: 70 MMHG | WEIGHT: 293 LBS | HEART RATE: 89 BPM

## 2020-10-06 DIAGNOSIS — J45.909 ACUTE ASTHMA: Primary | ICD-10-CM

## 2020-10-06 PROCEDURE — 71045 X-RAY EXAM CHEST 1 VIEW: CPT | Performed by: PHYSICIAN ASSISTANT

## 2020-10-06 PROCEDURE — 93010 ELECTROCARDIOGRAM REPORT: CPT | Performed by: PHYSICIAN ASSISTANT

## 2020-10-06 PROCEDURE — 94644 CONT INHLJ TX 1ST HOUR: CPT

## 2020-10-06 PROCEDURE — 94645 CONT INHLJ TX EACH ADDL HOUR: CPT

## 2020-10-06 PROCEDURE — 99284 EMERGENCY DEPT VISIT MOD MDM: CPT

## 2020-10-06 PROCEDURE — 93005 ELECTROCARDIOGRAM TRACING: CPT

## 2020-10-06 RX ORDER — PREDNISONE 20 MG/1
60 TABLET ORAL
Qty: 12 TABLET | Refills: 0 | Status: SHIPPED | OUTPATIENT
Start: 2020-10-06 | End: 2020-10-10

## 2020-10-06 RX ORDER — ALBUTEROL SULFATE 2.5 MG/3ML
2.5 SOLUTION RESPIRATORY (INHALATION) EVERY 4 HOURS PRN
Qty: 30 AMPULE | Refills: 0 | Status: SHIPPED | OUTPATIENT
Start: 2020-10-06 | End: 2021-03-06

## 2020-10-06 RX ORDER — ALBUTEROL SULFATE 90 UG/1
2 AEROSOL, METERED RESPIRATORY (INHALATION) EVERY 4 HOURS PRN
Qty: 1 INHALER | Refills: 0 | Status: SHIPPED | OUTPATIENT
Start: 2020-10-06 | End: 2020-11-05

## 2020-10-06 RX ORDER — IPRATROPIUM BROMIDE AND ALBUTEROL SULFATE 2.5; .5 MG/3ML; MG/3ML
3 SOLUTION RESPIRATORY (INHALATION) ONCE
Status: COMPLETED | OUTPATIENT
Start: 2020-10-06 | End: 2020-10-06

## 2020-10-06 NOTE — TELEPHONE ENCOUNTER
Action Requested: Summary for Provider     []  Critical Lab, Recommendations Needed  [] Need Additional Advice  [x]   FYI    []   Need Orders  [] Need Medications Sent to Pharmacy  []  Other     SUMMARY: Sent to ER.   FYI Dr Adonay Del Cid for call: Asthma

## 2020-10-07 ENCOUNTER — TELEPHONE (OUTPATIENT)
Dept: FAMILY MEDICINE CLINIC | Facility: CLINIC | Age: 20
End: 2020-10-07

## 2020-10-07 ENCOUNTER — OFFICE VISIT (OUTPATIENT)
Dept: FAMILY MEDICINE CLINIC | Facility: CLINIC | Age: 20
End: 2020-10-07
Payer: MEDICAID

## 2020-10-07 VITALS — HEIGHT: 65 IN | BODY MASS INDEX: 48.82 KG/M2 | TEMPERATURE: 98 F | WEIGHT: 293 LBS

## 2020-10-07 DIAGNOSIS — J45.41 MODERATE PERSISTENT ASTHMA WITH EXACERBATION: Primary | ICD-10-CM

## 2020-10-07 PROCEDURE — 3008F BODY MASS INDEX DOCD: CPT | Performed by: PHYSICIAN ASSISTANT

## 2020-10-07 PROCEDURE — 99213 OFFICE O/P EST LOW 20 MIN: CPT | Performed by: PHYSICIAN ASSISTANT

## 2020-10-07 RX ORDER — FLUTICASONE PROPIONATE AND SALMETEROL 500; 50 UG/1; UG/1
1 POWDER RESPIRATORY (INHALATION) 2 TIMES DAILY
Qty: 3 EACH | Refills: 0 | Status: SHIPPED | OUTPATIENT
Start: 2020-10-07 | End: 2020-11-06

## 2020-10-07 NOTE — ED PROVIDER NOTES
Patient Seen in: [facility] AND [facility] Emergency Department    History   Patient presents with:  Asthma    Stated Complaint: asthma, chest tight    HPI    78-year-old female presents with chief complaint of dyspnea. Onset 2 days ago.   Patient reports Madison Swain MCG/DOSE Inhalation Aerosol Powder, Breath Activated,  TAKE 1 PUFF BY MOUTH EVERY 12 HOURS  Patient not taking: Reported on 10/4/2020   Respiratory Therapy Supplies (NEBULIZER/TUBING/MOUTHPIECE) Does not apply Kit,  Use as needed with nebulizer machine and Normocephalic/atraumatic. Eyes: Pupils are equal round reactive to light. Conjunctiva are within normal limits. ENT: Oropharynx is clear. Neck: The neck is supple. There is no evidence of JVD. No meningeal signs.   Chest: There is no tenderness to the precautions discussed with patient. Patient case discussed with Dr. Oswaldo Marin.     Disposition and Plan     Clinical Impression:  Acute asthma  (primary encounter diagnosis)    Disposition:  Discharge    Follow-up:  Rachel Kelly MD  [id number] [facility]

## 2020-10-07 NOTE — PROGRESS NOTES
HPI:    Patient ID: Akshat Wu is a 23year old female. Pt presents for follow up from the urgent care/ ER for dyspnea. Was diagnosed with uncontrolled Asthma attack. Patient is being treated with prednisone, albuterol and nebulizer treatments. puffs into the lungs every 4 (four) hours as needed for Wheezing. albuterol sulfate (2.5 MG/3ML) 0.083% Inhalation Nebu Soln,  Take 3 mL (2.5 mg total) by nebulization every 4 (four) hours as needed for Wheezing or Shortness of Breath.    Spacer/Aero-Hold today and agreed except as otherwise stated in HPI.     Physical Exam     ED Triage Vitals  BP 10/06/20 1950 136/47  Pulse 10/06/20 1950 76  Resp 10/06/20 1950 (!) 28  Temp 10/06/20 1950 98.1 °F (36.7 °C)  Temp src 10/06/20 2145 Temporal  SpO2 10/06/20 195 Report. Rate: 75  Rhythm: Sinus Rhythm  Reading: No STEMI, interpreted by ED physician              ANN MARIE        Radiology findings: Xr Chest Ap Portable  (cpt=71045)     Result Date: 10/6/2020  CONCLUSION:           No acute cardiopulmonary disease.     Dic Spacer/Aero-Holding Chambers Does not apply Device  Use with albuterol inhaler  Qty: 1 Device Refills: 0     !! - Potential duplicate medications found. Please discuss with provider. Review of Systems   Constitutional: Negative for chills and fever. the lungs every 4 (four) hours as needed for Wheezing.  (Patient not taking: Reported on 10/7/2020 ) 1 Inhaler 0   • Albuterol Sulfate  (90 Base) MCG/ACT Inhalation Aero Soln Inhale 2 puffs into the lungs every 4 (four) hours as needed for Wheezing o however she does not want to. I advised to go to the ER with worsening symptoms. No orders of the defined types were placed in this encounter.       Meds This Visit:  Requested Prescriptions     Signed Prescriptions Disp Refills   • fluticasone-salm

## 2020-10-07 NOTE — TELEPHONE ENCOUNTER
Patient sounds uncontrolled with her breathing if using inhaler/ neb every 2 hrs   Have her see someone today

## 2020-10-07 NOTE — TELEPHONE ENCOUNTER
Advised patient of Dr. Jimbo Medina note. Patient verbalized understanding  Patient would prefer to see a provider as soon as possible. Patient scheduled with Leonidas Bates PA-C today at 4 p.m .

## 2020-10-07 NOTE — ED INITIAL ASSESSMENT (HPI)
Seen here on Sunday with asthma, given inhaler prescription. Presenting today, saying asthma is worse and feels like chest is tight + coughing. Concerned for pneumonia. Denies fevers. Inhaler used last 2 hours ago with minimal relief.

## 2020-10-07 NOTE — TELEPHONE ENCOUNTER
Pt is following up after her ER visit yesterday for asthma. States that she was given a nebulizer treatment and more prednisone (to be continued after existing rx given on Sunday is finished). Pt states that she has no fever and her CXR was clear.  She re

## 2020-10-08 ENCOUNTER — TELEPHONE (OUTPATIENT)
Dept: FAMILY MEDICINE CLINIC | Facility: CLINIC | Age: 20
End: 2020-10-08

## 2020-10-08 NOTE — TELEPHONE ENCOUNTER
Alice Pock to patient; she is not able to obtain her inhalers. (albuterol , fluticasone-salmeterol  Insurance suggested  to change directions; only way thye can release inhalers is if they change the directions. See 10/4/20 encounter; patient has been using all her INH and neb solutions more frequently. Can instructions be changed or we need to do a PA?

## 2020-10-08 NOTE — TELEPHONE ENCOUNTER
Patient is requesting an early release on her inhalers. She spoke with her insurance and they told her for us to change the directions on all of them because all her inhalers have the same directions. Please advise and call patient back. Thank you.

## 2020-10-08 NOTE — TELEPHONE ENCOUNTER
We need to do a prior authorization. I will not change the directions, because those are incorrect. She should use it every 4 hrs not every 2 hrs.

## 2020-10-12 NOTE — TELEPHONE ENCOUNTER
Prior authorization for fluticasone-salmeterol has been initiated through "[employer] using keycode: [id number] It takes about 1-5 business days for a decision to come back.

## 2020-10-14 NOTE — TELEPHONE ENCOUNTER
Let patient know. And ensure patient has picked up her albuterol inhaler. Needs to follow-up with Dr. Fran Coyle as well. Create follow-up appointment if not already made.

## 2020-11-12 ENCOUNTER — HOSPITAL ENCOUNTER (OUTPATIENT)
Age: 20
Discharge: HOME | End: 2020-11-12
Attending: FAMILY MEDICINE
Payer: MEDICAID

## 2020-11-12 VITALS
WEIGHT: 293 LBS | SYSTOLIC BLOOD PRESSURE: 124 MMHG | TEMPERATURE: 98 F | HEIGHT: 65 IN | RESPIRATION RATE: 24 BRPM | DIASTOLIC BLOOD PRESSURE: 73 MMHG | HEART RATE: 76 BPM | BODY MASS INDEX: 48.82 KG/M2 | OXYGEN SATURATION: 100 %

## 2020-11-12 DIAGNOSIS — J45.901 ASTHMA EXACERBATION, MILD: ICD-10-CM

## 2020-11-12 DIAGNOSIS — Z20.822 ENCOUNTER FOR LABORATORY TESTING FOR COVID-19 VIRUS: Primary | ICD-10-CM

## 2020-11-12 PROCEDURE — 99202 OFFICE O/P NEW SF 15 MIN: CPT | Performed by: FAMILY MEDICINE

## 2020-11-12 RX ORDER — ALBUTEROL SULFATE 90 UG/1
2 AEROSOL, METERED RESPIRATORY (INHALATION) EVERY 4 HOURS PRN
Qty: 1 INHALER | Refills: 0 | Status: SHIPPED | OUTPATIENT
Start: 2020-11-12 | End: 2020-12-12

## 2020-11-12 RX ORDER — PREDNISONE 20 MG/1
40 TABLET ORAL DAILY
Qty: 10 TABLET | Refills: 0 | Status: SHIPPED | OUTPATIENT
Start: 2020-11-12 | End: 2020-11-17

## 2020-11-12 NOTE — ED INITIAL ASSESSMENT (HPI)
Pt has history of asthma. Pt states has wheezing and has been using her albuterol inhaler. Pt states started to feel worse yesterday. No fever.

## 2020-11-12 NOTE — ED PROVIDER NOTES
Patient Seen in: [facility]      History   Patient presents with:  Asthma    Stated Complaint: asthma    HPI    Pt is a 24 yo with a h/o asthma. Now c/o worsening cough and wheezing.  Running out of the albuterol in    Past Medical History: heart sounds. Pulmonary:      Effort: Pulmonary effort is normal.      Breath sounds: Normal breath sounds. Skin:     General: Skin is warm. Capillary Refill: Capillary refill takes less than 2 seconds.    Neurological:      General: No focal defic

## 2021-03-05 ENCOUNTER — TELEPHONE (OUTPATIENT)
Dept: FAMILY MEDICINE CLINIC | Facility: CLINIC | Age: 21
End: 2021-03-05

## 2021-03-05 DIAGNOSIS — J45.21 MILD INTERMITTENT ASTHMA WITH EXACERBATION: Primary | ICD-10-CM

## 2021-03-05 NOTE — TELEPHONE ENCOUNTER
Patient requesting a refill for both her Albuterol inhaler and Albuterol for her nebulizer. Please advise.

## 2021-03-06 RX ORDER — ALBUTEROL SULFATE 2.5 MG/3ML
2.5 SOLUTION RESPIRATORY (INHALATION) EVERY 4 HOURS PRN
Qty: 30 AMPULE | Refills: 0 | Status: SHIPPED | OUTPATIENT
Start: 2021-03-06 | End: 2021-10-23

## 2021-03-06 RX ORDER — ALBUTEROL SULFATE 90 UG/1
2 AEROSOL, METERED RESPIRATORY (INHALATION) EVERY 4 HOURS PRN
Qty: 1 INHALER | Refills: 2 | Status: SHIPPED | OUTPATIENT
Start: 2021-03-06 | End: 2021-03-15

## 2021-03-06 NOTE — TELEPHONE ENCOUNTER
Advised patient of Dr. Fabian Alvares note. Patient verbalized understanding. Appointment made for 3/15/2021 at 8:30am with Dr Elva Arias at [facility].

## 2021-03-15 ENCOUNTER — OFFICE VISIT (OUTPATIENT)
Dept: FAMILY MEDICINE CLINIC | Facility: CLINIC | Age: 21
End: 2021-03-15
Payer: MEDICAID

## 2021-03-15 VITALS
HEIGHT: 65 IN | DIASTOLIC BLOOD PRESSURE: 73 MMHG | WEIGHT: 293 LBS | SYSTOLIC BLOOD PRESSURE: 110 MMHG | HEART RATE: 78 BPM | BODY MASS INDEX: 48.82 KG/M2 | TEMPERATURE: 98 F

## 2021-03-15 DIAGNOSIS — Z00.00 WELL ADULT EXAM: ICD-10-CM

## 2021-03-15 DIAGNOSIS — J45.21 MILD INTERMITTENT ASTHMA WITH EXACERBATION: ICD-10-CM

## 2021-03-15 DIAGNOSIS — E66.01 MORBID OBESITY WITH BMI OF 60.0-69.9, ADULT (HCC): ICD-10-CM

## 2021-03-15 DIAGNOSIS — J45.20 MILD INTERMITTENT ASTHMA WITHOUT COMPLICATION: Primary | ICD-10-CM

## 2021-03-15 PROCEDURE — 3008F BODY MASS INDEX DOCD: CPT | Performed by: FAMILY MEDICINE

## 2021-03-15 PROCEDURE — 3074F SYST BP LT 130 MM HG: CPT | Performed by: FAMILY MEDICINE

## 2021-03-15 PROCEDURE — 99214 OFFICE O/P EST MOD 30 MIN: CPT | Performed by: FAMILY MEDICINE

## 2021-03-15 PROCEDURE — 3078F DIAST BP <80 MM HG: CPT | Performed by: FAMILY MEDICINE

## 2021-03-15 RX ORDER — ALBUTEROL SULFATE 90 UG/1
2 AEROSOL, METERED RESPIRATORY (INHALATION) EVERY 4 HOURS PRN
Qty: 1 INHALER | Refills: 2 | Status: SHIPPED | OUTPATIENT
Start: 2021-03-15

## 2021-03-15 RX ORDER — FLUTICASONE PROPIONATE AND SALMETEROL 250; 50 UG/1; UG/1
1 POWDER RESPIRATORY (INHALATION) EVERY 12 HOURS SCHEDULED
Qty: 1 EACH | Refills: 1 | Status: SHIPPED | OUTPATIENT
Start: 2021-03-15 | End: 2021-04-14

## 2021-03-15 NOTE — PROGRESS NOTES
HPI:    Patient ID: Patricia Bueno is a 23year old female. HPI  Patient presents with: Follow - Up: on asthma would like refills on inhaler     Patient here to follow-up on asthma.   Patient states in the last few months asthma has been acting up a inhaler 1 Device 0     Allergies:No Known Allergies   PHYSICAL EXAM:   Patient presents with: Follow - Up: on asthma would like refills on inhaler      Physical Exam  Constitutional:       Appearance: She is well-developed.    HENT:      Nose: Nose normal. (twelve) hours. • Albuterol Sulfate  (90 Base) MCG/ACT Inhalation Aero Soln 1 Inhaler 2     Sig: Inhale 2 puffs into the lungs every 4 (four) hours as needed for Wheezing or Shortness of Breath.        Imaging & Referrals:  None       J#[id number]

## 2021-04-14 ENCOUNTER — OFFICE VISIT (OUTPATIENT)
Dept: FAMILY MEDICINE CLINIC | Facility: CLINIC | Age: 21
End: 2021-04-14
Payer: MEDICAID

## 2021-04-14 VITALS
WEIGHT: 293 LBS | HEIGHT: 65 IN | DIASTOLIC BLOOD PRESSURE: 82 MMHG | HEART RATE: 80 BPM | SYSTOLIC BLOOD PRESSURE: 127 MMHG | BODY MASS INDEX: 48.82 KG/M2 | TEMPERATURE: 97 F

## 2021-04-14 DIAGNOSIS — T78.40XA ALLERGIC REACTION, INITIAL ENCOUNTER: ICD-10-CM

## 2021-04-14 DIAGNOSIS — Z00.00 WELL ADULT EXAM: Primary | ICD-10-CM

## 2021-04-14 DIAGNOSIS — J45.30 MILD PERSISTENT ASTHMA WITHOUT COMPLICATION: ICD-10-CM

## 2021-04-14 DIAGNOSIS — E88.81 INSULIN RESISTANCE: ICD-10-CM

## 2021-04-14 DIAGNOSIS — E55.9 VITAMIN D DEFICIENCY: ICD-10-CM

## 2021-04-14 DIAGNOSIS — Z01.419 ENCOUNTER FOR GYNECOLOGICAL EXAMINATION: ICD-10-CM

## 2021-04-14 DIAGNOSIS — E66.01 MORBID OBESITY WITH BMI OF 60.0-69.9, ADULT (HCC): ICD-10-CM

## 2021-04-14 PROBLEM — J45.21 MILD INTERMITTENT ASTHMA WITH EXACERBATION: Status: RESOLVED | Noted: 2017-04-24 | Resolved: 2021-04-14

## 2021-04-14 PROBLEM — J45.21 MILD INTERMITTENT ASTHMA WITH EXACERBATION (HCC): Status: RESOLVED | Noted: 2017-04-24 | Resolved: 2021-04-14

## 2021-04-14 PROBLEM — R21 RASH AND NONSPECIFIC SKIN ERUPTION: Status: RESOLVED | Noted: 2019-12-05 | Resolved: 2021-04-14

## 2021-04-14 PROBLEM — E88.819 INSULIN RESISTANCE: Status: ACTIVE | Noted: 2021-04-14

## 2021-04-14 PROCEDURE — 3008F BODY MASS INDEX DOCD: CPT | Performed by: FAMILY MEDICINE

## 2021-04-14 PROCEDURE — 3079F DIAST BP 80-89 MM HG: CPT | Performed by: FAMILY MEDICINE

## 2021-04-14 PROCEDURE — 3074F SYST BP LT 130 MM HG: CPT | Performed by: FAMILY MEDICINE

## 2021-04-14 PROCEDURE — 99395 PREV VISIT EST AGE 18-39: CPT | Performed by: FAMILY MEDICINE

## 2021-04-14 RX ORDER — FLUTICASONE PROPIONATE AND SALMETEROL 250; 50 UG/1; UG/1
1 POWDER RESPIRATORY (INHALATION) EVERY 12 HOURS SCHEDULED
Qty: 1 EACH | Refills: 3 | Status: SHIPPED | OUTPATIENT
Start: 2021-04-14 | End: 2021-06-01 | Stop reason: ALTCHOICE

## 2021-04-14 NOTE — PROGRESS NOTES
HPI:    Patient ID: Rayne Goldberg is a 23year old female. HPI  Patient presents with: Well Adult    Patient overall feels well. She is trying to lose weight. She does not want take any medications or see bariatric surgery.     She states her ast discharge and vaginal pain. Periods are regular     Musculoskeletal: Negative for arthralgias, back pain and myalgias. Skin: Negative for rash. Neurological: Negative for dizziness, seizures, syncope, weakness, numbness and headaches.    H[employer]olo [employer]:       Active Member of Clubs or Organizations:       Attends Club or Organization Meetings:       Marital Status:   Intimate Partner Violence:       Fear of Current or Ex-Partner:       Emotionally Abused:       Physically Abused:       every 4 (four) hours as needed for Wheezing or Shortness of Breath.  1 Inhaler 2   • Respiratory Therapy Supplies (NEBULIZER/TUBING/MOUTHPIECE) Does not apply Kit Use as needed with nebulizer machine and albuterol solution every 4-6 hours for wheezing or sh Content:  Thought content normal.         Judgment: Judgment normal.                ASSESSMENT/PLAN:     Return yearly for physicals  Follow up with dentist every 6 months  Follow up with eye doctor yearly  Recommend aerobic exercise for at least 30mins 5 d

## 2021-04-16 ENCOUNTER — LAB ENCOUNTER (OUTPATIENT)
Dept: LAB | Age: 21
End: 2021-04-16
Attending: FAMILY MEDICINE
Payer: MEDICAID

## 2021-04-16 PROCEDURE — 83525 ASSAY OF INSULIN: CPT | Performed by: FAMILY MEDICINE

## 2021-04-16 PROCEDURE — 84443 ASSAY THYROID STIM HORMONE: CPT | Performed by: FAMILY MEDICINE

## 2021-04-16 PROCEDURE — 85025 COMPLETE CBC W/AUTO DIFF WBC: CPT | Performed by: FAMILY MEDICINE

## 2021-04-16 PROCEDURE — 80061 LIPID PANEL: CPT | Performed by: FAMILY MEDICINE

## 2021-04-16 PROCEDURE — 82306 VITAMIN D 25 HYDROXY: CPT | Performed by: FAMILY MEDICINE

## 2021-04-16 PROCEDURE — 82607 VITAMIN B-12: CPT | Performed by: FAMILY MEDICINE

## 2021-04-16 PROCEDURE — 36415 COLL VENOUS BLD VENIPUNCTURE: CPT | Performed by: FAMILY MEDICINE

## 2021-04-16 PROCEDURE — 80053 COMPREHEN METABOLIC PANEL: CPT | Performed by: FAMILY MEDICINE

## 2021-04-16 PROCEDURE — 83036 HEMOGLOBIN GLYCOSYLATED A1C: CPT | Performed by: FAMILY MEDICINE

## 2021-04-25 DIAGNOSIS — E55.9 VITAMIN D DEFICIENCY: Primary | ICD-10-CM

## 2021-04-25 RX ORDER — ERGOCALCIFEROL 1.25 MG/1
50000 CAPSULE ORAL WEEKLY
Qty: 12 CAPSULE | Refills: 3 | Status: SHIPPED | OUTPATIENT
Start: 2021-04-25 | End: 2021-05-25

## 2021-05-06 ENCOUNTER — OFFICE VISIT (OUTPATIENT)
Dept: ALLERGY | Facility: CLINIC | Age: 21
End: 2021-05-06
Payer: MEDICAID

## 2021-05-06 ENCOUNTER — NURSE ONLY (OUTPATIENT)
Dept: ALLERGY | Facility: CLINIC | Age: 21
End: 2021-05-06
Payer: MEDICAID

## 2021-05-06 VITALS
HEART RATE: 71 BPM | BODY MASS INDEX: 63 KG/M2 | WEIGHT: 293 LBS | DIASTOLIC BLOOD PRESSURE: 63 MMHG | SYSTOLIC BLOOD PRESSURE: 97 MMHG

## 2021-05-06 DIAGNOSIS — Z91.018 FOOD ALLERGY: ICD-10-CM

## 2021-05-06 DIAGNOSIS — H57.89 EYE SWELLING, BILATERAL: Primary | ICD-10-CM

## 2021-05-06 DIAGNOSIS — J30.89 ENVIRONMENTAL AND SEASONAL ALLERGIES: ICD-10-CM

## 2021-05-06 DIAGNOSIS — J30.1 SEASONAL ALLERGIC RHINITIS DUE TO POLLEN: ICD-10-CM

## 2021-05-06 PROCEDURE — 3074F SYST BP LT 130 MM HG: CPT | Performed by: ALLERGY & IMMUNOLOGY

## 2021-05-06 PROCEDURE — 95024 IQ TESTS W/ALLERGENIC XTRCS: CPT | Performed by: ALLERGY & IMMUNOLOGY

## 2021-05-06 PROCEDURE — 3078F DIAST BP <80 MM HG: CPT | Performed by: ALLERGY & IMMUNOLOGY

## 2021-05-06 PROCEDURE — 99244 OFF/OP CNSLTJ NEW/EST MOD 40: CPT | Performed by: ALLERGY & IMMUNOLOGY

## 2021-05-06 PROCEDURE — 95004 PERQ TESTS W/ALRGNC XTRCS: CPT | Performed by: ALLERGY & IMMUNOLOGY

## 2021-05-06 RX ORDER — LEVOCETIRIZINE DIHYDROCHLORIDE 5 MG/1
5 TABLET, FILM COATED ORAL NIGHTLY
Qty: 30 TABLET | Refills: 0 | Status: SHIPPED | OUTPATIENT
Start: 2021-05-06 | End: 2021-12-15

## 2021-05-06 RX ORDER — FLUTICASONE PROPIONATE 50 MCG
2 SPRAY, SUSPENSION (ML) NASAL DAILY
Qty: 1 BOTTLE | Refills: 0 | Status: SHIPPED | OUTPATIENT
Start: 2021-05-06

## 2021-05-06 NOTE — PATIENT INSTRUCTIONS
1. AR  Handouts on allergies and avoidance measures provided and reviewed including potential treatment option of immunotherapy  Xyzal, levocetirizine 5 mg once a night at bedtime if having runny nose sneezing itchy watery eyes puffy eyes  Add Flonase or N

## 2021-05-06 NOTE — PROGRESS NOTES
Neita Schaumann is a 23year old female. HPI:   Patient presents with:  Asthma  Food Allergy: eye swelling after being in contact with fish this past week.      Patient is a 57-year-old female who presents for allergy consultation upon referral of her Cancer Neg       Social History: Social History    Tobacco Use      Smoking status: Never Smoker      Smokeless tobacco: Never Used    Alcohol use: No    Drug use: No       Medications (Active prior to today's visit):  Current Outpatient Medications   Medi responsive, no acute distress noted  Head/Face: NC/Atraumatic  Eyes/Vision: conjunctiva and lids are normal extraocular motion is intact   Ears/Audiometry: tympanic membranes are normal bilaterally hearing is grossly intact  Nose/Mouth/Throat: nose and thr sprays per nostril once a day if having prominent nasal congestion  Consider Singulair if refractory      2. Food allergy    Medical symptoms in question include acute onset puffy eyes.   See above testing to common food allergens to screen for an IgE medi

## 2021-05-19 ENCOUNTER — HOSPITAL ENCOUNTER (OUTPATIENT)
Age: 21
Discharge: HOME | End: 2021-05-19
Payer: MEDICAID

## 2021-05-19 VITALS
OXYGEN SATURATION: 100 % | BODY MASS INDEX: 48.82 KG/M2 | WEIGHT: 293 LBS | TEMPERATURE: 98 F | RESPIRATION RATE: 18 BRPM | SYSTOLIC BLOOD PRESSURE: 133 MMHG | HEIGHT: 65 IN | DIASTOLIC BLOOD PRESSURE: 70 MMHG | HEART RATE: 80 BPM

## 2021-05-19 DIAGNOSIS — Z20.822 ENCOUNTER FOR LABORATORY TESTING FOR COVID-19 VIRUS: ICD-10-CM

## 2021-05-19 DIAGNOSIS — T78.40XA ALLERGY, INITIAL ENCOUNTER: ICD-10-CM

## 2021-05-19 DIAGNOSIS — J45.901 MILD ASTHMA WITH EXACERBATION, UNSPECIFIED WHETHER PERSISTENT: Primary | ICD-10-CM

## 2021-05-19 PROCEDURE — 99214 OFFICE O/P EST MOD 30 MIN: CPT | Performed by: NURSE PRACTITIONER

## 2021-05-19 PROCEDURE — U0002 COVID-19 LAB TEST NON-CDC: HCPCS | Performed by: NURSE PRACTITIONER

## 2021-05-19 RX ORDER — ALBUTEROL SULFATE 2.5 MG/3ML
2.5 SOLUTION RESPIRATORY (INHALATION) EVERY 4 HOURS PRN
Qty: 30 AMPULE | Refills: 0 | Status: SHIPPED | OUTPATIENT
Start: 2021-05-19 | End: 2021-06-18

## 2021-05-19 RX ORDER — PREDNISONE 20 MG/1
40 TABLET ORAL DAILY
Qty: 10 TABLET | Refills: 0 | Status: SHIPPED | OUTPATIENT
Start: 2021-05-19 | End: 2021-05-24

## 2021-05-19 NOTE — ED INITIAL ASSESSMENT (HPI)
C/o wheezing, shortness of breath and feeling \"tight. \" slight cough.  Last used albuterol inhaler last night at 9pm.

## 2021-05-23 ENCOUNTER — IMMUNIZATION (OUTPATIENT)
Dept: LAB | Facility: HOSPITAL | Age: 21
End: 2021-05-23
Attending: EMERGENCY MEDICINE
Payer: MEDICAID

## 2021-05-23 DIAGNOSIS — Z23 NEED FOR VACCINATION: Primary | ICD-10-CM

## 2021-05-23 PROCEDURE — 0001A SARSCOV2 VAC 30MCG/0.3ML IM: CPT

## 2021-05-26 PROCEDURE — 99284 EMERGENCY DEPT VISIT MOD MDM: CPT

## 2021-05-27 ENCOUNTER — APPOINTMENT (OUTPATIENT)
Dept: GENERAL RADIOLOGY | Facility: HOSPITAL | Age: 21
End: 2021-05-27
Attending: EMERGENCY MEDICINE
Payer: MEDICAID

## 2021-05-27 ENCOUNTER — HOSPITAL ENCOUNTER (EMERGENCY)
Facility: HOSPITAL | Age: 21
Discharge: HOME | End: 2021-05-27
Attending: EMERGENCY MEDICINE
Payer: MEDICAID

## 2021-05-27 VITALS
RESPIRATION RATE: 22 BRPM | TEMPERATURE: 97 F | OXYGEN SATURATION: 100 % | WEIGHT: 293 LBS | BODY MASS INDEX: 50 KG/M2 | DIASTOLIC BLOOD PRESSURE: 52 MMHG | HEART RATE: 90 BPM | SYSTOLIC BLOOD PRESSURE: 137 MMHG

## 2021-05-27 DIAGNOSIS — J45.41 MODERATE PERSISTENT ASTHMA WITH ACUTE EXACERBATION: Primary | ICD-10-CM

## 2021-05-27 PROCEDURE — 71045 X-RAY EXAM CHEST 1 VIEW: CPT | Performed by: EMERGENCY MEDICINE

## 2021-05-27 PROCEDURE — 94640 AIRWAY INHALATION TREATMENT: CPT

## 2021-05-27 RX ORDER — IPRATROPIUM BROMIDE AND ALBUTEROL SULFATE 2.5; .5 MG/3ML; MG/3ML
3 SOLUTION RESPIRATORY (INHALATION) ONCE
Status: COMPLETED | OUTPATIENT
Start: 2021-05-27 | End: 2021-05-27

## 2021-05-27 RX ORDER — PREDNISONE 20 MG/1
60 TABLET ORAL DAILY
Qty: 15 TABLET | Refills: 0 | Status: SHIPPED | OUTPATIENT
Start: 2021-05-27 | End: 2021-06-01

## 2021-05-27 NOTE — ED QUICK NOTES
Pt provided and explained d/c instructions, at-home care, follow-up, and rx. Pt in nad at this time. No iv access. Vss. Cohen. Ambulatory. A&ox3. Belongings with pt. All questions and concerns addressed.

## 2021-05-27 NOTE — ED PROVIDER NOTES
Patient signed out to me from previous medical team.  Patient is a 18-year-old female history of asthma status post recent steroids presenting with shortness of breath. Her previous care team's exam no wheezing. Patient given DuoNeb.   Rapid Covid negativ

## 2021-05-27 NOTE — ED PROVIDER NOTES
Patient Seen in: [facility] AND [facility] Emergency Department      History   Patient presents with:  Asthma    Stated Complaint: Asthma    HPI/Subjective:   HPI    25-year-old female with past medical history of asthma presents for evaluation for asthma exacer Exam  Vitals and nursing note reviewed. Constitutional:       Appearance: She is well-developed. HENT:      Head: Normocephalic and atraumatic.       Mouth/Throat:      Mouth: Mucous membranes are moist.   Eyes:      General: Lids are normal.      Extra home.     Imaging:   No results found. I personally reviewed all radiology images.     Medical Record Review: I personally reviewed available prior medical records for any recent pertinent discharge summaries, testing, and procedures, and reviewed those

## 2021-05-27 NOTE — ED INITIAL ASSESSMENT (HPI)
Pt states she has been having an asthma exacerbation x a week. States she was seen here last week and given prednisone. She finished the prednisone and is still not feeling better. States she is using her inhaler and nebulizer every 4 hours.  Patient speaki

## 2021-06-01 ENCOUNTER — OFFICE VISIT (OUTPATIENT)
Dept: FAMILY MEDICINE CLINIC | Facility: CLINIC | Age: 21
End: 2021-06-01
Payer: MEDICAID

## 2021-06-01 VITALS
DIASTOLIC BLOOD PRESSURE: 70 MMHG | WEIGHT: 293 LBS | OXYGEN SATURATION: 99 % | HEIGHT: 65 IN | HEART RATE: 69 BPM | TEMPERATURE: 98 F | SYSTOLIC BLOOD PRESSURE: 114 MMHG | BODY MASS INDEX: 48.82 KG/M2

## 2021-06-01 DIAGNOSIS — E66.01 MORBID OBESITY WITH BMI OF 60.0-69.9, ADULT (HCC): ICD-10-CM

## 2021-06-01 DIAGNOSIS — J45.41 MODERATE PERSISTENT ASTHMA WITH EXACERBATION: Primary | ICD-10-CM

## 2021-06-01 DIAGNOSIS — Z23 NEED FOR PNEUMOCOCCAL VACCINATION: ICD-10-CM

## 2021-06-01 PROBLEM — J45.20 MILD INTERMITTENT ASTHMA WITHOUT COMPLICATION (HCC): Status: RESOLVED | Noted: 2019-12-05 | Resolved: 2021-06-01

## 2021-06-01 PROBLEM — J45.20 MILD INTERMITTENT ASTHMA WITHOUT COMPLICATION: Status: RESOLVED | Noted: 2019-12-05 | Resolved: 2021-06-01

## 2021-06-01 PROCEDURE — 3078F DIAST BP <80 MM HG: CPT | Performed by: FAMILY MEDICINE

## 2021-06-01 PROCEDURE — 3074F SYST BP LT 130 MM HG: CPT | Performed by: FAMILY MEDICINE

## 2021-06-01 PROCEDURE — 3008F BODY MASS INDEX DOCD: CPT | Performed by: FAMILY MEDICINE

## 2021-06-01 PROCEDURE — 99214 OFFICE O/P EST MOD 30 MIN: CPT | Performed by: FAMILY MEDICINE

## 2021-06-01 NOTE — PROGRESS NOTES
HPI:    Patient ID: Miriam Godoy is a 23year old female.     HPI  Patient presents with:  ER F/U    Patient has had multiple visits for asthma exacerbation last year and again most recently on May 27    Chest x-ray reviewed which was normal.  No repo Suspension 2 sprays by Nasal route daily. 1 Bottle 0   • Albuterol Sulfate  (90 Base) MCG/ACT Inhalation Aero Soln Inhale 2 puffs into the lungs every 4 (four) hours as needed for Wheezing or Shortness of Breath.  1 Inhaler 2   • Respiratory Therapy dietary and eating habits as well as increasing vegetable and fruit intake. Recommending avoiding foods high in fat content. Recommend exercising at least 30-40 minutes 5-6 days a week. Avoid skipping meals.   Making healthy choices for snacks and also l

## 2021-06-15 ENCOUNTER — IMMUNIZATION (OUTPATIENT)
Dept: LAB | Facility: HOSPITAL | Age: 21
End: 2021-06-15
Attending: EMERGENCY MEDICINE
Payer: MEDICAID

## 2021-06-15 DIAGNOSIS — Z23 NEED FOR VACCINATION: Primary | ICD-10-CM

## 2021-06-15 PROCEDURE — 0002A SARSCOV2 VAC 30MCG/0.3ML IM: CPT

## 2021-08-04 ENCOUNTER — HOSPITAL ENCOUNTER (OUTPATIENT)
Age: 21
Discharge: HOME | End: 2021-08-04
Payer: MEDICAID

## 2021-08-04 VITALS
OXYGEN SATURATION: 100 % | BODY MASS INDEX: 48.82 KG/M2 | HEART RATE: 78 BPM | RESPIRATION RATE: 20 BRPM | WEIGHT: 293 LBS | HEIGHT: 65 IN | SYSTOLIC BLOOD PRESSURE: 155 MMHG | TEMPERATURE: 98 F | DIASTOLIC BLOOD PRESSURE: 69 MMHG

## 2021-08-04 DIAGNOSIS — J02.0 STREPTOCOCCAL SORE THROAT: Primary | ICD-10-CM

## 2021-08-04 LAB
S PYO AG THROAT QL: POSITIVE
SARS-COV-2 RNA RESP QL NAA+PROBE: NOT DETECTED

## 2021-08-04 PROCEDURE — U0002 COVID-19 LAB TEST NON-CDC: HCPCS | Performed by: NURSE PRACTITIONER

## 2021-08-04 PROCEDURE — 87880 STREP A ASSAY W/OPTIC: CPT | Performed by: NURSE PRACTITIONER

## 2021-08-04 PROCEDURE — 99213 OFFICE O/P EST LOW 20 MIN: CPT | Performed by: NURSE PRACTITIONER

## 2021-08-04 RX ORDER — AMOXICILLIN 875 MG/1
875 TABLET, COATED ORAL 2 TIMES DAILY
Qty: 20 TABLET | Refills: 0 | Status: SHIPPED | OUTPATIENT
Start: 2021-08-04 | End: 2021-08-14

## 2021-08-04 RX ORDER — DEXTROMETHORPHAN HYDROBROMIDE AND PROMETHAZINE HYDROCHLORIDE 15; 6.25 MG/5ML; MG/5ML
5 SYRUP ORAL 4 TIMES DAILY PRN
Qty: 118 ML | Refills: 0 | Status: SHIPPED | OUTPATIENT
Start: 2021-08-04 | End: 2021-12-15

## 2021-08-04 NOTE — ED PROVIDER NOTES
Patient Seen in: [facility]      History   Patient presents with:  Sore Throat    Stated Complaint: sorethroat    HPI/Subjective:   Anne Matos is a 19year-old female who presents to the [facility] complaining of sore throat and c SpO2 100 %   O2 Device None (Room air)       Current:/69   Pulse 78   Temp 98 °F (36.7 °C) (Temporal)   Resp 20   Ht 165.1 cm (5' 5\")   Wt 136.1 kg   LMP 05/05/2021 (Approximate)   SpO2 100%   BMI 49.92 kg/m²         Physical Exam  Vitals and nurs present. Mental Status: She is alert and oriented to person, place, and time. Psychiatric:         Mood and Affect: Mood normal.         Behavior: Behavior normal.         Thought Content:  Thought content normal.         Judgment: Judgment normal. and Plan     Clinical Impression:  Streptococcal sore throat  (primary encounter diagnosis)     Disposition:  Discharge  8/4/2021  3:25 pm    Follow-up:  [place] [address] [address] [phone]                Medications Pres

## 2021-08-10 ENCOUNTER — NURSE TRIAGE (OUTPATIENT)
Dept: FAMILY MEDICINE CLINIC | Facility: CLINIC | Age: 21
End: 2021-08-10

## 2021-08-10 NOTE — TELEPHONE ENCOUNTER
Action Requested: Summary for Provider     []  Critical Lab, Recommendations Needed  [] Need Additional Advice  [x]   FYI    []   Need Orders  [] Need Medications Sent to Pharmacy  []  Other     SUMMARY: Patient was seen 8/4/[facility] at [facility] for sore t

## 2021-08-11 ENCOUNTER — HOSPITAL ENCOUNTER (OUTPATIENT)
Age: 21
Discharge: HOME | End: 2021-08-11
Attending: PHYSICIAN ASSISTANT
Payer: MEDICAID

## 2021-08-11 VITALS
HEART RATE: 77 BPM | DIASTOLIC BLOOD PRESSURE: 56 MMHG | WEIGHT: 293 LBS | RESPIRATION RATE: 20 BRPM | SYSTOLIC BLOOD PRESSURE: 122 MMHG | TEMPERATURE: 97 F | BODY MASS INDEX: 48.82 KG/M2 | OXYGEN SATURATION: 100 % | HEIGHT: 65 IN

## 2021-08-11 DIAGNOSIS — Z87.09 HISTORY OF ASTHMA: ICD-10-CM

## 2021-08-11 DIAGNOSIS — H66.91 RIGHT ACUTE OTITIS MEDIA: Primary | ICD-10-CM

## 2021-08-11 PROCEDURE — 99213 OFFICE O/P EST LOW 20 MIN: CPT | Performed by: PHYSICIAN ASSISTANT

## 2021-08-11 RX ORDER — FLUTICASONE PROPIONATE 50 MCG
2 SPRAY, SUSPENSION (ML) NASAL DAILY
Qty: 16 G | Refills: 0 | Status: SHIPPED | OUTPATIENT
Start: 2021-08-11 | End: 2021-08-24

## 2021-08-11 RX ORDER — IBUPROFEN 600 MG/1
TABLET ORAL
Qty: 20 TABLET | Refills: 0 | Status: SHIPPED | OUTPATIENT
Start: 2021-08-11 | End: 2021-08-24

## 2021-08-11 RX ORDER — CEFDINIR 300 MG/1
300 CAPSULE ORAL 2 TIMES DAILY
Qty: 20 CAPSULE | Refills: 0 | Status: SHIPPED | OUTPATIENT
Start: 2021-08-11 | End: 2021-08-21

## 2021-08-11 RX ORDER — VERAPAMIL HCL 80 MG
2 TABLET ORAL DAILY
Qty: 8.7 G | Refills: 0 | Status: SHIPPED | OUTPATIENT
Start: 2021-08-11 | End: 2021-12-15 | Stop reason: ALTCHOICE

## 2021-08-11 NOTE — ED PROVIDER NOTES
Patient Seen in: [facility]    History   Patient presents with:  Ear Pain    Stated Complaint: rt ear pain    HPI    20-year-old female presents with chief complaint of right earache. Onset 4 days ago.   Patient states she is currently taking MCG/ACT Inhalation Aero Soln,  Inhale 2 puffs into the lungs every 4 (four) hours as needed for Wheezing or Shortness of Breath.    Respiratory Therapy Supplies (NEBULIZER/TUBING/MOUTHPIECE) Does not apply Kit,  Use as needed with nebulizer machine and albu limits bilaterally. No tonsillar exudates. Uvula midline. No trismus. No drooling. Right TM injected. Opaque fluid present proximally to intact right TM. Left TM within normal limits. Auditory canals within normal limits bilaterally.   No post auric importance of following up with their doctor as instructed. The patient verbalized understanding of the discharge instructions and plan.     I spent a total of 20 minutes during chart review, obtaining history, performing a physical exam, bedside monitoring

## 2021-08-24 ENCOUNTER — OFFICE VISIT (OUTPATIENT)
Dept: FAMILY MEDICINE CLINIC | Facility: CLINIC | Age: 21
End: 2021-08-24
Payer: MEDICAID

## 2021-08-24 ENCOUNTER — TELEPHONE (OUTPATIENT)
Dept: FAMILY MEDICINE CLINIC | Facility: CLINIC | Age: 21
End: 2021-08-24

## 2021-08-24 VITALS
WEIGHT: 293 LBS | HEART RATE: 71 BPM | TEMPERATURE: 98 F | SYSTOLIC BLOOD PRESSURE: 133 MMHG | BODY MASS INDEX: 48.82 KG/M2 | DIASTOLIC BLOOD PRESSURE: 74 MMHG | OXYGEN SATURATION: 99 % | HEIGHT: 65 IN

## 2021-08-24 DIAGNOSIS — H92.01 RIGHT EAR PAIN: ICD-10-CM

## 2021-08-24 DIAGNOSIS — J02.9 SORE THROAT: Primary | ICD-10-CM

## 2021-08-24 PROCEDURE — 3078F DIAST BP <80 MM HG: CPT | Performed by: FAMILY MEDICINE

## 2021-08-24 PROCEDURE — 3008F BODY MASS INDEX DOCD: CPT | Performed by: FAMILY MEDICINE

## 2021-08-24 PROCEDURE — 99214 OFFICE O/P EST MOD 30 MIN: CPT | Performed by: FAMILY MEDICINE

## 2021-08-24 PROCEDURE — 3075F SYST BP GE 130 - 139MM HG: CPT | Performed by: FAMILY MEDICINE

## 2021-08-24 RX ORDER — LEVOCETIRIZINE DIHYDROCHLORIDE 5 MG/1
5 TABLET, FILM COATED ORAL EVERY EVENING
Qty: 30 TABLET | Refills: 1 | Status: SHIPPED | OUTPATIENT
Start: 2021-08-24

## 2021-08-24 RX ORDER — IBUPROFEN 600 MG/1
600 TABLET ORAL EVERY 8 HOURS PRN
Qty: 30 TABLET | Refills: 0 | Status: SHIPPED | OUTPATIENT
Start: 2021-08-24 | End: 2021-08-25

## 2021-08-24 RX ORDER — DEXAMETHASONE 4 MG/1
2 TABLET ORAL 2 TIMES DAILY
COMMUNITY
Start: 2021-08-11 | End: 2021-12-15 | Stop reason: ALTCHOICE

## 2021-08-24 RX ORDER — AZITHROMYCIN 250 MG/1
TABLET, FILM COATED ORAL
Qty: 6 TABLET | Refills: 0 | Status: SHIPPED | OUTPATIENT
Start: 2021-08-24 | End: 2021-08-29

## 2021-08-24 NOTE — TELEPHONE ENCOUNTER
Patient is requesting a refill for ibuprofen 600 MG Oral Tab. Medication was originally prescribed during immediate care visit on 8/11.       Patient is also requesting the name of the over counter medication Dr. Matteo Yu recommended to prevent an upset stomach

## 2021-08-24 NOTE — PROGRESS NOTES
HPI:    Patient ID: Shaquille Arndt is a 19year old female. HPI  Patient presents with:  Urgent Care F/u: still having some pain in right ear and throat     Patient seen in the urgent care on August 11 for right acute otitis media.   Patient was afshan promethazine-dextromethorphan 6.25-15 MG/5ML Oral Syrup Take 5 mL by mouth 4 (four) times daily as needed.  118 mL 0   • Fluticasone Furoate-Vilanterol (BREO ELLIPTA) 200-25 MCG/INH Inhalation Aerosol Powder, Breath Activated Inhale 1 puff into the lungs da for 1 day, THEN 1 tablet (250 mg total) daily for 4 days. Dispense: 6 tablet; Refill: 0  - levocetirizine 5 MG Oral Tab; Take 1 tablet (5 mg total) by mouth every evening. Dispense: 30 tablet; Refill: 1    2.  Right ear pain  As above   - ENT - INTERNAL

## 2021-08-25 RX ORDER — IBUPROFEN 600 MG/1
600 TABLET ORAL EVERY 6 HOURS PRN
Qty: 30 TABLET | Refills: 0 | Status: SHIPPED | OUTPATIENT
Start: 2021-08-25 | End: 2021-12-21

## 2021-08-25 NOTE — TELEPHONE ENCOUNTER
Patient called to state that pharmacy needs clarification on instructions for ibuprofen:      Medication Quantity Refills Start End   ibuprofen 600 MG Oral Tab 30 tablet 0 8/24/2021    Sig:   Take 1 tablet (600 mg total) by mouth every 8 (eight) hours as n

## 2021-08-27 ENCOUNTER — OFFICE VISIT (OUTPATIENT)
Dept: OTOLARYNGOLOGY | Facility: CLINIC | Age: 21
End: 2021-08-27
Payer: MEDICAID

## 2021-08-27 VITALS — BODY MASS INDEX: 48.82 KG/M2 | HEIGHT: 65 IN | WEIGHT: 293 LBS

## 2021-08-27 DIAGNOSIS — H92.01 RIGHT EAR PAIN: Primary | ICD-10-CM

## 2021-08-27 PROCEDURE — 99243 OFF/OP CNSLTJ NEW/EST LOW 30: CPT | Performed by: OTOLARYNGOLOGY

## 2021-08-27 PROCEDURE — 3008F BODY MASS INDEX DOCD: CPT | Performed by: OTOLARYNGOLOGY

## 2021-08-27 RX ORDER — MELOXICAM 15 MG/1
15 TABLET ORAL DAILY
Qty: 30 TABLET | Refills: 3 | Status: SHIPPED | OUTPATIENT
Start: 2021-08-27

## 2021-08-27 RX ORDER — CYCLOBENZAPRINE HCL 5 MG
5 TABLET ORAL NIGHTLY
Qty: 30 TABLET | Refills: 1 | Status: SHIPPED | OUTPATIENT
Start: 2021-08-27

## 2021-08-27 NOTE — PROGRESS NOTES
Neita Schaumann is a 19year old female. Patient presents with:  Ear Problem: pt is here today due to ear pain in her rigth ear for about two weeks now. . She was is on an antibiotic but will be done tomorrow with it but she does not get any relief. intolerance and heat intolerance. Neuro Negative Tremors. Psych Negative Anxiety and depression. Integumentary Negative Frequent skin infections, pigment change and rash. Hema/Lymph Negative Easy bleeding and easy bruising.            PHYSICAL EXAM total) by mouth daily for 1 day, THEN 1 tablet (250 mg total) daily for 4 days. , Disp: 6 tablet, Rfl: 0  •  levocetirizine 5 MG Oral Tab, Take 1 tablet (5 mg total) by mouth every evening., Disp: 30 tablet, Rfl: 1  •  Beclomethasone Dipropionate (QVAR) 80 have asked her to stop chewing gum which she states she does for several hours a day. In addition I will start her on cyclobenzaprine and meloxicam and she will return to see me in 1 month.         This note was prepared using [employer]

## 2021-09-30 ENCOUNTER — OFFICE VISIT (OUTPATIENT)
Dept: OTOLARYNGOLOGY | Facility: CLINIC | Age: 21
End: 2021-09-30
Payer: MEDICAID

## 2021-09-30 VITALS — WEIGHT: 293 LBS | HEIGHT: 65 IN | BODY MASS INDEX: 48.82 KG/M2

## 2021-09-30 DIAGNOSIS — H92.01 RIGHT EAR PAIN: Primary | ICD-10-CM

## 2021-09-30 PROCEDURE — 99213 OFFICE O/P EST LOW 20 MIN: CPT | Performed by: OTOLARYNGOLOGY

## 2021-09-30 PROCEDURE — 3008F BODY MASS INDEX DOCD: CPT | Performed by: OTOLARYNGOLOGY

## 2021-10-01 NOTE — PROGRESS NOTES
Aldo Lynch is a 19year old female. Patient presents with: Follow - Up: pt presents today for a f/u on right ear pain. pt states has a little bit of pain.        HISTORY OF PRESENT ILLNESS    She presents with a history of right ear pain for 2 wee weight loss. ENMT Negative Drooling. Eyes Negative Blurred vision and vision changes. Respiratory Negative Dyspnea and wheezing. Cardio Negative Chest pain, irregular heartbeat/palpitations and syncope. GI Negative Abdominal pain and diarrhea. MG Oral Tab, Take 1 tablet (5 mg total) by mouth nightly., Disp: 30 tablet, Rfl: 1  •  Meloxicam 15 MG Oral Tab, Take 1 tablet (15 mg total) by mouth daily. , Disp: 30 tablet, Rfl: 3  •  ibuprofen 600 MG Oral Tab, Take 1 tablet (600 mg total) by mouth every sure if she grinds or clenches but will be seeing her dentist in a month therefore I have asked her to discuss this issue with her dentist.  We discussed initiating TMJ therapy and we will go ahead with a CT scan to rule out any type of deeper process with

## 2021-10-09 ENCOUNTER — HOSPITAL ENCOUNTER (OUTPATIENT)
Dept: CT IMAGING | Facility: HOSPITAL | Age: 21
Discharge: HOME | End: 2021-10-09
Attending: OTOLARYNGOLOGY
Payer: MEDICAID

## 2021-10-09 DIAGNOSIS — H92.01 RIGHT EAR PAIN: ICD-10-CM

## 2021-10-09 PROCEDURE — 70480 CT ORBIT/EAR/FOSSA W/O DYE: CPT | Performed by: OTOLARYNGOLOGY

## 2021-10-14 ENCOUNTER — HOSPITAL ENCOUNTER (OUTPATIENT)
Age: 21
Discharge: HOME | End: 2021-10-14
Payer: MEDICAID

## 2021-10-14 ENCOUNTER — APPOINTMENT (OUTPATIENT)
Dept: GENERAL RADIOLOGY | Age: 21
End: 2021-10-14
Attending: NURSE PRACTITIONER
Payer: MEDICAID

## 2021-10-14 ENCOUNTER — APPOINTMENT (OUTPATIENT)
Dept: PHYSICAL THERAPY | Age: 21
End: 2021-10-14
Attending: OTOLARYNGOLOGY
Payer: MEDICAID

## 2021-10-14 VITALS
RESPIRATION RATE: 24 BRPM | DIASTOLIC BLOOD PRESSURE: 63 MMHG | OXYGEN SATURATION: 98 % | HEART RATE: 90 BPM | TEMPERATURE: 97 F | WEIGHT: 293 LBS | HEIGHT: 65 IN | BODY MASS INDEX: 48.82 KG/M2 | SYSTOLIC BLOOD PRESSURE: 114 MMHG

## 2021-10-14 DIAGNOSIS — J06.9 UPPER RESPIRATORY VIRUS: Primary | ICD-10-CM

## 2021-10-14 DIAGNOSIS — J02.9 VIRAL PHARYNGITIS: ICD-10-CM

## 2021-10-14 PROCEDURE — 36415 COLL VENOUS BLD VENIPUNCTURE: CPT | Performed by: NURSE PRACTITIONER

## 2021-10-14 PROCEDURE — 85025 COMPLETE CBC W/AUTO DIFF WBC: CPT | Performed by: NURSE PRACTITIONER

## 2021-10-14 PROCEDURE — 81025 URINE PREGNANCY TEST: CPT | Performed by: NURSE PRACTITIONER

## 2021-10-14 PROCEDURE — 93000 ELECTROCARDIOGRAM COMPLETE: CPT | Performed by: NURSE PRACTITIONER

## 2021-10-14 PROCEDURE — 99214 OFFICE O/P EST MOD 30 MIN: CPT | Performed by: NURSE PRACTITIONER

## 2021-10-14 PROCEDURE — 84484 ASSAY OF TROPONIN QUANT: CPT | Performed by: NURSE PRACTITIONER

## 2021-10-14 PROCEDURE — 80047 BASIC METABLC PNL IONIZED CA: CPT | Performed by: NURSE PRACTITIONER

## 2021-10-14 PROCEDURE — 71046 X-RAY EXAM CHEST 2 VIEWS: CPT | Performed by: NURSE PRACTITIONER

## 2021-10-14 PROCEDURE — U0002 COVID-19 LAB TEST NON-CDC: HCPCS | Performed by: NURSE PRACTITIONER

## 2021-10-14 PROCEDURE — 87880 STREP A ASSAY W/OPTIC: CPT | Performed by: NURSE PRACTITIONER

## 2021-10-14 PROCEDURE — 81002 URINALYSIS NONAUTO W/O SCOPE: CPT | Performed by: NURSE PRACTITIONER

## 2021-10-14 RX ORDER — PREDNISONE 20 MG/1
40 TABLET ORAL DAILY
Qty: 10 TABLET | Refills: 0 | Status: SHIPPED | OUTPATIENT
Start: 2021-10-14 | End: 2021-10-19

## 2021-10-14 NOTE — ED INITIAL ASSESSMENT (HPI)
Wants a covid test. Sore throat, cough. Hx of asthma. Some shortness of breath. Out of albuterol but states that her doctor filled it, she just needs to pick it up from the pharmacy.

## 2021-10-14 NOTE — ED PROVIDER NOTES
Patient presents with:  Covid-19 Test      HPI:     Tamir Arango is a 19year old female who presents for evaluation and management of a chief complaint of chest pain and shortness of breath that started yesterday.   She is also complaining of a sore   Worried About [facility] in the Last Year: Not on file      Ran Out of Food in the Last Year: Not on file  Transportation Needs:       Lack of Transportation (Medical): Not on file      Lack of Transportation (Non-Medical):  Not on file  Physical normal  NOSE: nasal turbinates: pink, normal mucosa  THROAT: redness noted, uvula midline and airway patent  LUNGS: clear to auscultation bilaterally; no rales, rhonchi, or wheezes   NEURO: No focal deficits.      MDM/Assessment/Plan:   Orders for this enco Detected Not Detected   POCT Urinalysis Dipstick    Collection Time: 10/14/21  1:53 PM   Result Value Ref Range    Urine Color Yellow Yellow    Urine Clarity Slightly cloudy (A) Clear    Specific Gravity, Urine 1.025 1.005 - 1.030    PH, Urine 5.5 5.0 - 8. Pregnancy Negative Negative       MDM:   EKG:  NSR  HR 90  No ST elevation or ischemic changes  No changes from previous    XR CHEST PA + LAT CHEST (CPT=71046)    Result Date: 10/14/2021  CONCLUSION:  1.  Negative chest. 2. Minimal chronic anterior wedging

## 2021-10-19 ENCOUNTER — APPOINTMENT (OUTPATIENT)
Dept: PHYSICAL THERAPY | Age: 21
End: 2021-10-19
Attending: OTOLARYNGOLOGY
Payer: MEDICAID

## 2021-10-21 ENCOUNTER — APPOINTMENT (OUTPATIENT)
Dept: PHYSICAL THERAPY | Age: 21
End: 2021-10-21
Attending: OTOLARYNGOLOGY
Payer: MEDICAID

## 2021-10-23 DIAGNOSIS — J45.21 MILD INTERMITTENT ASTHMA WITH EXACERBATION: ICD-10-CM

## 2021-10-23 RX ORDER — ALBUTEROL SULFATE 2.5 MG/3ML
SOLUTION RESPIRATORY (INHALATION)
Qty: 90 ML | Refills: 0 | Status: SHIPPED | OUTPATIENT
Start: 2021-10-23 | End: 2021-12-21

## 2021-10-27 ENCOUNTER — APPOINTMENT (OUTPATIENT)
Dept: PHYSICAL THERAPY | Age: 21
End: 2021-10-27
Attending: OTOLARYNGOLOGY
Payer: MEDICAID

## 2021-11-02 ENCOUNTER — APPOINTMENT (OUTPATIENT)
Dept: PHYSICAL THERAPY | Age: 21
End: 2021-11-02
Attending: OTOLARYNGOLOGY
Payer: MEDICAID

## 2021-11-02 ENCOUNTER — TELEPHONE (OUTPATIENT)
Dept: PHYSICAL THERAPY | Age: 21
End: 2021-11-02

## 2021-11-05 ENCOUNTER — APPOINTMENT (OUTPATIENT)
Dept: PHYSICAL THERAPY | Age: 21
End: 2021-11-05
Attending: OTOLARYNGOLOGY
Payer: MEDICAID

## 2021-11-05 ENCOUNTER — TELEPHONE (OUTPATIENT)
Dept: PHYSICAL THERAPY | Facility: HOSPITAL | Age: 21
End: 2021-11-05

## 2021-11-10 ENCOUNTER — APPOINTMENT (OUTPATIENT)
Dept: PHYSICAL THERAPY | Age: 21
End: 2021-11-10
Attending: OTOLARYNGOLOGY
Payer: MEDICAID

## 2021-11-15 ENCOUNTER — APPOINTMENT (OUTPATIENT)
Dept: PHYSICAL THERAPY | Age: 21
End: 2021-11-15
Attending: OTOLARYNGOLOGY
Payer: MEDICAID

## 2021-11-17 ENCOUNTER — APPOINTMENT (OUTPATIENT)
Dept: PHYSICAL THERAPY | Age: 21
End: 2021-11-17
Attending: OTOLARYNGOLOGY
Payer: MEDICAID

## 2021-11-18 ENCOUNTER — APPOINTMENT (OUTPATIENT)
Dept: PHYSICAL THERAPY | Age: 21
End: 2021-11-18
Attending: OTOLARYNGOLOGY
Payer: MEDICAID

## 2021-11-22 ENCOUNTER — APPOINTMENT (OUTPATIENT)
Dept: PHYSICAL THERAPY | Age: 21
End: 2021-11-22
Attending: OTOLARYNGOLOGY
Payer: MEDICAID

## 2021-11-24 ENCOUNTER — APPOINTMENT (OUTPATIENT)
Dept: PHYSICAL THERAPY | Age: 21
End: 2021-11-24
Attending: OTOLARYNGOLOGY
Payer: MEDICAID

## 2021-11-26 ENCOUNTER — APPOINTMENT (OUTPATIENT)
Dept: PHYSICAL THERAPY | Age: 21
End: 2021-11-26
Attending: OTOLARYNGOLOGY
Payer: MEDICAID

## 2021-11-29 ENCOUNTER — APPOINTMENT (OUTPATIENT)
Dept: PHYSICAL THERAPY | Age: 21
End: 2021-11-29
Attending: OTOLARYNGOLOGY
Payer: MEDICAID

## 2021-12-01 ENCOUNTER — HOSPITAL ENCOUNTER (OUTPATIENT)
Age: 21
Discharge: HOME | End: 2021-12-01
Payer: MEDICAID

## 2021-12-01 VITALS
SYSTOLIC BLOOD PRESSURE: 144 MMHG | OXYGEN SATURATION: 97 % | WEIGHT: 293 LBS | BODY MASS INDEX: 48.82 KG/M2 | HEART RATE: 86 BPM | HEIGHT: 65 IN | RESPIRATION RATE: 18 BRPM | TEMPERATURE: 97 F | DIASTOLIC BLOOD PRESSURE: 68 MMHG

## 2021-12-01 DIAGNOSIS — J02.9 SORE THROAT: Primary | ICD-10-CM

## 2021-12-01 DIAGNOSIS — J02.0 STREPTOCOCCAL SORE THROAT: ICD-10-CM

## 2021-12-01 PROCEDURE — U0002 COVID-19 LAB TEST NON-CDC: HCPCS | Performed by: NURSE PRACTITIONER

## 2021-12-01 PROCEDURE — 87880 STREP A ASSAY W/OPTIC: CPT | Performed by: NURSE PRACTITIONER

## 2021-12-01 PROCEDURE — 99213 OFFICE O/P EST LOW 20 MIN: CPT | Performed by: NURSE PRACTITIONER

## 2021-12-01 RX ORDER — AMOXICILLIN 500 MG/1
500 TABLET, FILM COATED ORAL 2 TIMES DAILY
Qty: 20 TABLET | Refills: 0 | Status: SHIPPED | OUTPATIENT
Start: 2021-12-01 | End: 2021-12-11

## 2021-12-01 RX ORDER — IBUPROFEN 600 MG/1
600 TABLET ORAL EVERY 8 HOURS PRN
Qty: 30 TABLET | Refills: 0 | Status: SHIPPED | OUTPATIENT
Start: 2021-12-01 | End: 2021-12-08

## 2021-12-08 NOTE — ED PROVIDER NOTES
Patient Seen in: [facility]      History   Patient presents with:  Sore Throat    Stated Complaint: sorethroat/ear pain    Subjective:   HPI    18 yo Sharon Holman arrives to the ic with co sore throat, tolerating secretions, phonation normal, neck supp tympanic membrane, ear canal and external ear normal.      Left Ear: Hearing, tympanic membrane, ear canal and external ear normal.      Nose: Nose normal.      Mouth/Throat:      Mouth: Mucous membranes are moist.      Pharynx: Uvula midline.  Posterior or diagnostic results were interpreted by myself. HPI obtained with pt as primary historian. Physical exam remained stable over serial reexaminations as previously documented. Results reviewed with patient.      I have given the both verbal and writt Impression:  Sore throat  (primary encounter diagnosis)  Streptococcal sore throat     Disposition:  Discharge  12/1/2021  3:03 pm    Follow-up:  Ursula Aldana MD  [place] [id number]  [phone]                Medications Prescribed:

## 2021-12-15 ENCOUNTER — OFFICE VISIT (OUTPATIENT)
Dept: FAMILY MEDICINE CLINIC | Facility: CLINIC | Age: 21
End: 2021-12-15
Payer: MEDICAID

## 2021-12-15 VITALS
DIASTOLIC BLOOD PRESSURE: 89 MMHG | TEMPERATURE: 97 F | WEIGHT: 293 LBS | SYSTOLIC BLOOD PRESSURE: 132 MMHG | BODY MASS INDEX: 48.82 KG/M2 | HEIGHT: 65 IN | HEART RATE: 83 BPM

## 2021-12-15 DIAGNOSIS — J02.0 STREP THROAT: Primary | ICD-10-CM

## 2021-12-15 DIAGNOSIS — J45.40 MODERATE PERSISTENT ASTHMA WITHOUT COMPLICATION: ICD-10-CM

## 2021-12-15 DIAGNOSIS — J03.01 RECURRENT STREPTOCOCCAL TONSILLITIS: ICD-10-CM

## 2021-12-15 DIAGNOSIS — E66.01 MORBID OBESITY WITH BMI OF 60.0-69.9, ADULT (HCC): ICD-10-CM

## 2021-12-15 PROCEDURE — 99214 OFFICE O/P EST MOD 30 MIN: CPT | Performed by: FAMILY MEDICINE

## 2021-12-15 PROCEDURE — 3079F DIAST BP 80-89 MM HG: CPT | Performed by: FAMILY MEDICINE

## 2021-12-15 PROCEDURE — 3008F BODY MASS INDEX DOCD: CPT | Performed by: FAMILY MEDICINE

## 2021-12-15 PROCEDURE — 3075F SYST BP GE 130 - 139MM HG: CPT | Performed by: FAMILY MEDICINE

## 2021-12-16 NOTE — PROGRESS NOTES
HPI:    Patient ID: Fabián Pugh is a 19year old female. HPI  Patient presents with:  Asthma: follow up   Urgent Care F/u: had strep throat December 1, 2021     Patient seen with complains of sore throat on December 1.   She tested positive for st Take 1 tablet (5 mg total) by mouth nightly. 30 tablet 1   • Meloxicam 15 MG Oral Tab Take 1 tablet (15 mg total) by mouth daily. 30 tablet 3   • ibuprofen 600 MG Oral Tab Take 1 tablet (600 mg total) by mouth every 6 (six) hours as needed for Pain.  30 tab tonsillitis  Morbid obesity with bmi of 60.0-69.9, adult ([facility]cc)    1. Strep throat  Complete antibiotics    2.  Moderate persistent asthma without complication  Continue present management   - fluticasone furoate-vilanterol (BREO ELLIPTA) 200-25 MCG/INH Rick Astorga

## 2021-12-21 ENCOUNTER — TELEPHONE (OUTPATIENT)
Dept: FAMILY MEDICINE CLINIC | Facility: CLINIC | Age: 21
End: 2021-12-21

## 2021-12-21 DIAGNOSIS — J45.21 MILD INTERMITTENT ASTHMA WITH EXACERBATION: ICD-10-CM

## 2021-12-21 RX ORDER — ALBUTEROL SULFATE 2.5 MG/3ML
2.5 SOLUTION RESPIRATORY (INHALATION) EVERY 4 HOURS PRN
Qty: 90 ML | Refills: 0 | OUTPATIENT
Start: 2021-12-21

## 2021-12-21 RX ORDER — ALBUTEROL SULFATE 2.5 MG/3ML
2.5 SOLUTION RESPIRATORY (INHALATION) EVERY 4 HOURS PRN
Qty: 90 ML | Refills: 1 | Status: SHIPPED | OUTPATIENT
Start: 2021-12-21 | End: 2022-02-16

## 2021-12-21 RX ORDER — AMOXICILLIN AND CLAVULANATE POTASSIUM 875; 125 MG/1; MG/1
1 TABLET, FILM COATED ORAL 2 TIMES DAILY
Qty: 20 TABLET | Refills: 0 | Status: SHIPPED | OUTPATIENT
Start: 2021-12-21 | End: 2021-12-31

## 2021-12-21 NOTE — TELEPHONE ENCOUNTER
I will send a prescription for Augmentin. Take antibiotic 1 tablet twice daily for 10 days. Recommend taking probiotics to prevent diarrhea. Recommend taking antibiotic with food. If symptoms worsening would recommend going to ER.   Also patient has bee

## 2021-12-21 NOTE — TELEPHONE ENCOUNTER
Refill passed per [place] [facility] protocol.      Requested Prescriptions   Pending Prescriptions Disp Refills    ALBUTEROL (2.5 MG/3ML) 0.083% Inhalation Nebu Soln [Pharmacy Med Name: ALBUTEROL 0.083%(2.5MG/3ML) 30X3ML] 90 mL 0     Sig: USE 1 VIAL VIA NEBULIZER EVERY 4 HOURS AS NEEDED FOR WHEEZING OR SHORTNESS OF BREATH        Asthma & COPD Medication Protocol Passed - 12/21/2021  9:08 AM        Passed - Appointment in past 6 or next 3 months                Recent Outpatient Visits              6 days ago Strep throat    Marleny Cisneros MD    Office Visit    2 months ago Right ear pain    [facility] [address], Mago Santos MD    Office Visit    3 months ago Right ear pain    [facility], Mago Santos MD    Office Visit    3 months ago Sore throat    Marleny Cisneros MD    Office Visit    6 months ago Moderate persistent asthma with exacerbation    Marleny Cisneros MD    Office Visit

## 2021-12-21 NOTE — TELEPHONE ENCOUNTER
Please review; protocol failed/no protocol    Requested Prescriptions   Pending Prescriptions Disp Refills    ibuprofen 600 MG Oral Tab 30 tablet 0     Sig: Take 1 tablet (600 mg total) by mouth every 6 (six) hours as needed for Pain.         There is no re

## 2021-12-21 NOTE — TELEPHONE ENCOUNTER
Patient contacts clinic reporting ongoing symptoms of sore throat and chest tightness. Using albuterol nebs which help initially. She is not using breo inhaler. She feels chilled. Requesting possible steroid or abx.   I advised patient that if breathing

## 2021-12-22 ENCOUNTER — TELEPHONE (OUTPATIENT)
Dept: FAMILY MEDICINE CLINIC | Facility: CLINIC | Age: 21
End: 2021-12-22

## 2021-12-22 ENCOUNTER — HOSPITAL ENCOUNTER (OUTPATIENT)
Age: 21
Discharge: HOME | End: 2021-12-22
Payer: MEDICAID

## 2021-12-22 VITALS
DIASTOLIC BLOOD PRESSURE: 88 MMHG | OXYGEN SATURATION: 100 % | WEIGHT: 293 LBS | HEIGHT: 65 IN | HEART RATE: 95 BPM | BODY MASS INDEX: 48.82 KG/M2 | TEMPERATURE: 99 F | SYSTOLIC BLOOD PRESSURE: 153 MMHG | RESPIRATION RATE: 20 BRPM

## 2021-12-22 DIAGNOSIS — U07.1 COVID-19: ICD-10-CM

## 2021-12-22 DIAGNOSIS — R05.9 COUGH: Primary | ICD-10-CM

## 2021-12-22 DIAGNOSIS — J45.40 MODERATE PERSISTENT ASTHMA WITHOUT COMPLICATION: ICD-10-CM

## 2021-12-22 PROCEDURE — U0002 COVID-19 LAB TEST NON-CDC: HCPCS | Performed by: NURSE PRACTITIONER

## 2021-12-22 PROCEDURE — 87880 STREP A ASSAY W/OPTIC: CPT | Performed by: NURSE PRACTITIONER

## 2021-12-22 PROCEDURE — 99213 OFFICE O/P EST LOW 20 MIN: CPT | Performed by: NURSE PRACTITIONER

## 2021-12-22 RX ORDER — DEXTROMETHORPHAN HYDROBROMIDE AND PROMETHAZINE HYDROCHLORIDE 15; 6.25 MG/5ML; MG/5ML
5 SYRUP ORAL 4 TIMES DAILY PRN
Qty: 118 ML | Refills: 0 | Status: SHIPPED | OUTPATIENT
Start: 2021-12-22

## 2021-12-22 RX ORDER — IBUPROFEN 600 MG/1
600 TABLET ORAL EVERY 6 HOURS PRN
Qty: 30 TABLET | Refills: 0 | Status: SHIPPED | OUTPATIENT
Start: 2021-12-22

## 2021-12-22 RX ORDER — ALBUTEROL SULFATE 90 UG/1
2 AEROSOL, METERED RESPIRATORY (INHALATION) EVERY 4 HOURS PRN
Qty: 1 EACH | Refills: 0 | Status: SHIPPED | OUTPATIENT
Start: 2021-12-22 | End: 2022-01-21

## 2021-12-23 NOTE — TELEPHONE ENCOUNTER
Review not covered by insurance. Please inform patient. I have sent a prescription for [facility] which is 2 puffs twice a day. If asthma gets improvement controlled she may decrease down to 1 puff twice daily.

## 2021-12-23 NOTE — ED PROVIDER NOTES
Patient Seen in: [facility]      History   Patient presents with:  Sore Throat    Stated Complaint: sore throat , pain when swallowing x 2 days    Subjective:   Anne Alonso is a 19year-old presents to the immediate care complaining of are as noted in HPI. Constitutional and vital signs reviewed. All other systems reviewed and negative except as noted above.     Physical Exam     ED Triage Vitals [12/22/21 1857]   /88   Pulse 95   Resp 20   Temp 98.7 °F (37.1 °C)   Temp src Or and dry. Capillary Refill: Capillary refill takes less than 2 seconds. Findings: No rash. Neurological:      General: No focal deficit present. Mental Status: She is alert and oriented to person, place, and time.    Psychiatric:         Moo summaries/testing. This includes but not limited to OP/IP visits, radiology tests , clinical labs tests, EKG's, and medication. The [facility] makes Medical Notes like these available to patients in the interest of transparency.   However,

## 2022-02-16 DIAGNOSIS — J45.20 MILD INTERMITTENT ASTHMA WITHOUT COMPLICATION: ICD-10-CM

## 2022-02-16 DIAGNOSIS — J45.21 MILD INTERMITTENT ASTHMA WITH EXACERBATION: ICD-10-CM

## 2022-02-16 RX ORDER — ALBUTEROL SULFATE 90 UG/1
2 AEROSOL, METERED RESPIRATORY (INHALATION) EVERY 4 HOURS PRN
Qty: 1 EACH | Refills: 1 | Status: SHIPPED | OUTPATIENT
Start: 2022-02-16 | End: 2022-11-01

## 2022-02-16 RX ORDER — ALBUTEROL SULFATE 2.5 MG/3ML
2.5 SOLUTION RESPIRATORY (INHALATION) EVERY 4 HOURS PRN
Qty: 90 ML | Refills: 1 | Status: SHIPPED | OUTPATIENT
Start: 2022-02-16 | End: 2022-11-01

## 2022-02-16 NOTE — TELEPHONE ENCOUNTER
Refill passed per [place] [employer] [place] protocol. Requested Prescriptions   Pending Prescriptions Disp Refills    albuterol 108 (90 Base) MCG/ACT Inhalation Aero Soln  0     Sig: Inhale 2 puffs into the lungs every 4 (four) hours as needed for Wheezing or Shortness of Breath. Asthma & COPD Medication Protocol Passed - 2/16/2022 12:05 PM        Passed - Appointment in past 6 or next 3 months           albuterol (2.5 MG/3ML) 0.083% Inhalation Nebu Soln 90 mL 1     Sig: Take 3 mL (2.5 mg total) by nebulization every 4 (four) hours as needed for Wheezing or Shortness of Breath.         Asthma & COPD Medication Protocol Passed - 2/16/2022 12:05 PM        Passed - Appointment in past 6 or next 3 months             Recent Outpatient Visits              2 months ago Strep throat    [facility], [facility], [address] MD    Office Visit    4 months ago Right ear pain    [facility], [address], Diego Santos MD    Office Visit    5 months ago Right ear pain    [facility] for [address], Diego Santos MD    Office Visit    5 months ago Sore throat    Santa Godinez MD    Office Visit    8 months ago Moderate persistent asthma with exacerbation    Santa Godinez MD    Office Visit

## 2022-03-15 ENCOUNTER — HOSPITAL ENCOUNTER (EMERGENCY)
Facility: HOSPITAL | Age: 22
Discharge: HOME | End: 2022-03-15
Attending: EMERGENCY MEDICINE
Payer: OTHER MISCELLANEOUS

## 2022-03-15 ENCOUNTER — APPOINTMENT (OUTPATIENT)
Dept: GENERAL RADIOLOGY | Facility: HOSPITAL | Age: 22
End: 2022-03-15
Payer: OTHER MISCELLANEOUS

## 2022-03-15 VITALS
SYSTOLIC BLOOD PRESSURE: 147 MMHG | TEMPERATURE: 98 F | WEIGHT: 293 LBS | HEIGHT: 65 IN | BODY MASS INDEX: 48.82 KG/M2 | OXYGEN SATURATION: 100 % | HEART RATE: 73 BPM | RESPIRATION RATE: 18 BRPM | DIASTOLIC BLOOD PRESSURE: 83 MMHG

## 2022-03-15 DIAGNOSIS — S83.92XA SPRAIN OF LEFT KNEE, UNSPECIFIED LIGAMENT, INITIAL ENCOUNTER: Primary | ICD-10-CM

## 2022-03-15 PROCEDURE — 99283 EMERGENCY DEPT VISIT LOW MDM: CPT

## 2022-03-15 PROCEDURE — 73560 X-RAY EXAM OF KNEE 1 OR 2: CPT | Performed by: EMERGENCY MEDICINE

## 2022-03-15 RX ORDER — IBUPROFEN 600 MG/1
600 TABLET ORAL ONCE
Status: COMPLETED | OUTPATIENT
Start: 2022-03-15 | End: 2022-03-15

## 2022-03-15 RX ORDER — IBUPROFEN 600 MG/1
600 TABLET ORAL EVERY 8 HOURS PRN
Qty: 15 TABLET | Refills: 0 | Status: SHIPPED | OUTPATIENT
Start: 2022-03-15 | End: 2022-03-20

## 2022-03-16 NOTE — ED INITIAL ASSESSMENT (HPI)
Patient presents to ED s/p at work. Patient states her foot got caught in something at work and when she went to untangle her foot she fell landing on her L knee and then her side.

## 2022-04-01 ENCOUNTER — APPOINTMENT (OUTPATIENT)
Dept: GENERAL RADIOLOGY | Age: 22
End: 2022-04-01
Attending: NURSE PRACTITIONER
Payer: MEDICAID

## 2022-04-01 ENCOUNTER — HOSPITAL ENCOUNTER (OUTPATIENT)
Age: 22
Discharge: HOME | End: 2022-04-01
Payer: MEDICAID

## 2022-04-01 VITALS
HEART RATE: 71 BPM | OXYGEN SATURATION: 98 % | SYSTOLIC BLOOD PRESSURE: 108 MMHG | DIASTOLIC BLOOD PRESSURE: 75 MMHG | RESPIRATION RATE: 16 BRPM | TEMPERATURE: 97 F | WEIGHT: 293 LBS | HEIGHT: 65 IN | BODY MASS INDEX: 48.82 KG/M2

## 2022-04-01 DIAGNOSIS — S96.911A STRAIN OF RIGHT ANKLE, INITIAL ENCOUNTER: ICD-10-CM

## 2022-04-01 DIAGNOSIS — M25.571 RIGHT ANKLE PAIN, UNSPECIFIED CHRONICITY: Primary | ICD-10-CM

## 2022-04-01 PROCEDURE — 73610 X-RAY EXAM OF ANKLE: CPT | Performed by: NURSE PRACTITIONER

## 2022-04-01 PROCEDURE — 99213 OFFICE O/P EST LOW 20 MIN: CPT | Performed by: NURSE PRACTITIONER

## 2022-04-01 NOTE — ED INITIAL ASSESSMENT (HPI)
Pt presents with left knee pain x 2 weeks, seen at [facility] ED, Xrays completed at that time. Knee injury was due to a mechanical fall at work. Right ankle pain x 1 week. No fall reported, pain developed one week after left knee pain. Pt reports difficulty ambulating.      Motrin PO taken today in am.

## 2022-04-02 RX ORDER — IBUPROFEN 600 MG/1
600 TABLET ORAL EVERY 6 HOURS PRN
Qty: 30 TABLET | Refills: 0 | Status: SHIPPED | OUTPATIENT
Start: 2022-04-02

## 2022-04-02 NOTE — TELEPHONE ENCOUNTER
Please review refill protocol failed/ no protocol  Requested Prescriptions   Pending Prescriptions Disp Refills    ibuprofen 600 MG Oral Tab 30 tablet 0     Sig: Take 1 tablet (600 mg total) by mouth every 6 (six) hours as needed for Pain.         There is no refill protocol information for this order

## 2022-04-18 ENCOUNTER — TELEMEDICINE (OUTPATIENT)
Dept: FAMILY MEDICINE CLINIC | Facility: CLINIC | Age: 22
End: 2022-04-18

## 2022-04-18 DIAGNOSIS — M79.602 LEFT ARM PAIN: ICD-10-CM

## 2022-04-18 DIAGNOSIS — S93.401D SPRAIN OF RIGHT ANKLE, UNSPECIFIED LIGAMENT, SUBSEQUENT ENCOUNTER: Primary | ICD-10-CM

## 2022-04-18 DIAGNOSIS — M25.562 ACUTE PAIN OF LEFT KNEE: ICD-10-CM

## 2022-04-18 PROCEDURE — 99213 OFFICE O/P EST LOW 20 MIN: CPT | Performed by: FAMILY MEDICINE

## 2022-05-19 DIAGNOSIS — J02.9 SORE THROAT: ICD-10-CM

## 2022-05-19 DIAGNOSIS — H92.01 RIGHT EAR PAIN: ICD-10-CM

## 2022-05-19 RX ORDER — LEVOCETIRIZINE DIHYDROCHLORIDE 5 MG/1
5 TABLET, FILM COATED ORAL EVERY EVENING
Qty: 90 TABLET | Refills: 1 | Status: SHIPPED | OUTPATIENT
Start: 2022-05-19

## 2022-05-21 RX ORDER — FLUTICASONE PROPIONATE 50 MCG
2 SPRAY, SUSPENSION (ML) NASAL DAILY
Refills: 0 | OUTPATIENT
Start: 2022-05-21

## 2022-05-21 NOTE — TELEPHONE ENCOUNTER
Call reviewed and noted. Patient last seen over 1 year ago.   Recommend to purchase Flonase over-the-counter if not willing to make an appointment at this time

## 2022-06-22 ENCOUNTER — OFFICE VISIT (OUTPATIENT)
Dept: FAMILY MEDICINE CLINIC | Facility: CLINIC | Age: 22
End: 2022-06-22
Payer: MEDICAID

## 2022-06-22 VITALS
WEIGHT: 293 LBS | DIASTOLIC BLOOD PRESSURE: 79 MMHG | SYSTOLIC BLOOD PRESSURE: 137 MMHG | HEIGHT: 65 IN | BODY MASS INDEX: 48.82 KG/M2 | HEART RATE: 93 BPM

## 2022-06-22 DIAGNOSIS — L02.211 CUTANEOUS ABSCESS OF ABDOMINAL WALL: Primary | ICD-10-CM

## 2022-06-22 DIAGNOSIS — N92.1 MENORRHAGIA WITH IRREGULAR CYCLE: ICD-10-CM

## 2022-06-22 PROCEDURE — 99214 OFFICE O/P EST MOD 30 MIN: CPT | Performed by: NURSE PRACTITIONER

## 2022-06-22 PROCEDURE — 3008F BODY MASS INDEX DOCD: CPT | Performed by: NURSE PRACTITIONER

## 2022-06-22 PROCEDURE — 3078F DIAST BP <80 MM HG: CPT | Performed by: NURSE PRACTITIONER

## 2022-06-22 PROCEDURE — 3075F SYST BP GE 130 - 139MM HG: CPT | Performed by: NURSE PRACTITIONER

## 2022-06-22 RX ORDER — IBUPROFEN 600 MG/1
600 TABLET ORAL EVERY 6 HOURS PRN
Qty: 40 TABLET | Refills: 0 | Status: SHIPPED | OUTPATIENT
Start: 2022-06-22 | End: 2022-07-22

## 2022-06-22 RX ORDER — CEPHALEXIN 500 MG/1
500 CAPSULE ORAL 3 TIMES DAILY
Qty: 21 CAPSULE | Refills: 0 | Status: SHIPPED | OUTPATIENT
Start: 2022-06-22 | End: 2022-06-29

## 2022-06-30 ENCOUNTER — TELEPHONE (OUTPATIENT)
Dept: FAMILY MEDICINE CLINIC | Facility: CLINIC | Age: 22
End: 2022-06-30

## 2022-06-30 NOTE — TELEPHONE ENCOUNTER
May continue ibuprofen or tylenol as needed for pain. If she can not tolerate side effects, may stop antibiotics.

## 2022-06-30 NOTE — TELEPHONE ENCOUNTER
Patient contacts clinic with condition update. States since starting cephalexin she has developed constant headache. States her whole head hurts  Mild dizziness. Denies visual disturbance, light sensitivity, nausea or vomiting. Denies weakness, chest pain or shortness of breath. She has 2 days left of antibiotic. Ibuprofen helps. Please advise.

## 2022-07-07 ENCOUNTER — OFFICE VISIT (OUTPATIENT)
Dept: FAMILY MEDICINE CLINIC | Facility: CLINIC | Age: 22
End: 2022-07-07
Payer: MEDICAID

## 2022-07-07 VITALS
SYSTOLIC BLOOD PRESSURE: 132 MMHG | BODY MASS INDEX: 48.82 KG/M2 | DIASTOLIC BLOOD PRESSURE: 79 MMHG | HEIGHT: 65 IN | HEART RATE: 62 BPM | WEIGHT: 293 LBS

## 2022-07-07 DIAGNOSIS — R51.9 ACUTE INTRACTABLE HEADACHE, UNSPECIFIED HEADACHE TYPE: Primary | ICD-10-CM

## 2022-07-07 PROCEDURE — 99213 OFFICE O/P EST LOW 20 MIN: CPT | Performed by: NURSE PRACTITIONER

## 2022-07-07 PROCEDURE — 3078F DIAST BP <80 MM HG: CPT | Performed by: NURSE PRACTITIONER

## 2022-07-07 PROCEDURE — 3075F SYST BP GE 130 - 139MM HG: CPT | Performed by: NURSE PRACTITIONER

## 2022-07-07 PROCEDURE — 3008F BODY MASS INDEX DOCD: CPT | Performed by: NURSE PRACTITIONER

## 2022-07-07 RX ORDER — NAPROXEN 500 MG/1
500 TABLET ORAL 2 TIMES DAILY PRN
Qty: 60 TABLET | Refills: 0 | Status: SHIPPED | OUTPATIENT
Start: 2022-07-07

## 2022-07-07 RX ORDER — SUMATRIPTAN 25 MG/1
TABLET, FILM COATED ORAL
Qty: 9 TABLET | Refills: 0 | Status: SHIPPED | OUTPATIENT
Start: 2022-07-07

## 2022-07-11 ENCOUNTER — HOSPITAL ENCOUNTER (OUTPATIENT)
Age: 22
Discharge: HOME | End: 2022-07-11
Payer: MEDICAID

## 2022-07-11 VITALS
HEART RATE: 101 BPM | RESPIRATION RATE: 18 BRPM | DIASTOLIC BLOOD PRESSURE: 64 MMHG | HEIGHT: 65 IN | TEMPERATURE: 97 F | BODY MASS INDEX: 48.82 KG/M2 | SYSTOLIC BLOOD PRESSURE: 134 MMHG | WEIGHT: 293 LBS | OXYGEN SATURATION: 100 %

## 2022-07-11 DIAGNOSIS — H92.01 EARACHE ON RIGHT: Primary | ICD-10-CM

## 2022-07-11 RX ORDER — AMOXICILLIN 500 MG/1
500 CAPSULE ORAL 3 TIMES DAILY
Qty: 21 CAPSULE | Refills: 0 | Status: SHIPPED | OUTPATIENT
Start: 2022-07-11 | End: 2022-07-18

## 2022-07-12 NOTE — ED INITIAL ASSESSMENT (HPI)
Pt in [address] [place] [address] for c/o headaches and ear pain. Pt states has been having headaches and was seen by her doctor for it and put on naproxen but now started to develop ear pain that radiates to her throat. Pt states R ear is worse. Denies any fever, no URI.

## 2022-08-09 ENCOUNTER — NURSE TRIAGE (OUTPATIENT)
Dept: FAMILY MEDICINE CLINIC | Facility: CLINIC | Age: 22
End: 2022-08-09

## 2022-08-09 ENCOUNTER — APPOINTMENT (OUTPATIENT)
Dept: GENERAL RADIOLOGY | Age: 22
End: 2022-08-09
Attending: NURSE PRACTITIONER
Payer: MEDICAID

## 2022-08-09 ENCOUNTER — APPOINTMENT (OUTPATIENT)
Dept: ULTRASOUND IMAGING | Age: 22
End: 2022-08-09
Attending: NURSE PRACTITIONER
Payer: MEDICAID

## 2022-08-09 ENCOUNTER — HOSPITAL ENCOUNTER (OUTPATIENT)
Age: 22
Discharge: HOME | End: 2022-08-09
Payer: MEDICAID

## 2022-08-09 VITALS
OXYGEN SATURATION: 100 % | TEMPERATURE: 97 F | RESPIRATION RATE: 18 BRPM | HEART RATE: 78 BPM | WEIGHT: 293 LBS | SYSTOLIC BLOOD PRESSURE: 120 MMHG | BODY MASS INDEX: 48.82 KG/M2 | HEIGHT: 65 IN | DIASTOLIC BLOOD PRESSURE: 76 MMHG

## 2022-08-09 DIAGNOSIS — M25.571 ACUTE RIGHT ANKLE PAIN: Primary | ICD-10-CM

## 2022-08-09 PROCEDURE — 93971 EXTREMITY STUDY: CPT | Performed by: NURSE PRACTITIONER

## 2022-08-09 PROCEDURE — 99213 OFFICE O/P EST LOW 20 MIN: CPT | Performed by: NURSE PRACTITIONER

## 2022-08-09 PROCEDURE — 73610 X-RAY EXAM OF ANKLE: CPT | Performed by: NURSE PRACTITIONER

## 2022-08-09 NOTE — ED INITIAL ASSESSMENT (HPI)
Pt presents with right ankle pain. Pt reports fall while at work in 4/22. Pt seen by [facility] at that time, released from therapy approx 1 month ago. Pt reports pain has been increasing for 2 weeks. Sent by PMD for further evaluation. No pain medication taken today for pain.

## 2022-08-11 DIAGNOSIS — R51.9 ACUTE INTRACTABLE HEADACHE, UNSPECIFIED HEADACHE TYPE: ICD-10-CM

## 2022-08-12 ENCOUNTER — TELEMEDICINE (OUTPATIENT)
Dept: FAMILY MEDICINE CLINIC | Facility: CLINIC | Age: 22
End: 2022-08-12
Payer: MEDICAID

## 2022-08-12 DIAGNOSIS — M25.571 ACUTE RIGHT ANKLE PAIN: Primary | ICD-10-CM

## 2022-08-12 PROCEDURE — 99212 OFFICE O/P EST SF 10 MIN: CPT | Performed by: NURSE PRACTITIONER

## 2022-08-14 RX ORDER — NAPROXEN 500 MG/1
TABLET ORAL
Qty: 60 TABLET | Refills: 0 | OUTPATIENT
Start: 2022-08-14

## 2022-08-15 NOTE — TELEPHONE ENCOUNTER
naproxen 500 MG Oral request.   Per the patient she has been in and out of the hospital for ankle pain and ankle swelling from an injury. The patient is requesting a Monday appointment for she if off work. Can we use a RES24 for next Monday?

## 2022-08-16 ENCOUNTER — TELEPHONE (OUTPATIENT)
Dept: FAMILY MEDICINE CLINIC | Facility: CLINIC | Age: 22
End: 2022-08-16

## 2022-08-17 NOTE — TELEPHONE ENCOUNTER
Copy printed I spoke with patient and will be picking it up from [place] second floor and possibly her mother will be picking up

## 2022-08-19 DIAGNOSIS — R51.9 ACUTE INTRACTABLE HEADACHE, UNSPECIFIED HEADACHE TYPE: ICD-10-CM

## 2022-08-19 RX ORDER — NAPROXEN 500 MG/1
500 TABLET ORAL 2 TIMES DAILY PRN
Qty: 60 TABLET | Refills: 0 | Status: SHIPPED | OUTPATIENT
Start: 2022-08-19

## 2022-08-24 ENCOUNTER — OFFICE VISIT (OUTPATIENT)
Dept: ORTHOPEDICS CLINIC | Facility: CLINIC | Age: 22
End: 2022-08-24
Payer: MEDICAID

## 2022-08-24 VITALS — HEIGHT: 66.34 IN | BODY MASS INDEX: 46.53 KG/M2 | WEIGHT: 293 LBS

## 2022-08-24 DIAGNOSIS — M25.571 CHRONIC PAIN OF RIGHT ANKLE: Primary | ICD-10-CM

## 2022-08-24 DIAGNOSIS — G89.29 CHRONIC PAIN OF RIGHT ANKLE: Primary | ICD-10-CM

## 2022-08-24 DIAGNOSIS — E66.01 MORBID OBESITY WITH BMI OF 60.0-69.9, ADULT (HCC): ICD-10-CM

## 2022-08-24 PROCEDURE — 99284 EMERGENCY DEPT VISIT MOD MDM: CPT

## 2022-08-24 PROCEDURE — 3008F BODY MASS INDEX DOCD: CPT | Performed by: ORTHOPAEDIC SURGERY

## 2022-08-24 PROCEDURE — 99244 OFF/OP CNSLTJ NEW/EST MOD 40: CPT | Performed by: ORTHOPAEDIC SURGERY

## 2022-08-24 NOTE — ADDENDUM NOTE
Addended by: [facility] on: 8/24/2022 10:51 AM     Modules accepted: Jomar

## 2022-08-25 ENCOUNTER — APPOINTMENT (OUTPATIENT)
Dept: GENERAL RADIOLOGY | Facility: HOSPITAL | Age: 22
End: 2022-08-25
Attending: EMERGENCY MEDICINE
Payer: OTHER MISCELLANEOUS

## 2022-08-25 ENCOUNTER — HOSPITAL ENCOUNTER (EMERGENCY)
Facility: HOSPITAL | Age: 22
Discharge: HOME | End: 2022-08-25
Payer: OTHER MISCELLANEOUS

## 2022-08-25 VITALS
BODY MASS INDEX: 48 KG/M2 | DIASTOLIC BLOOD PRESSURE: 72 MMHG | HEART RATE: 82 BPM | TEMPERATURE: 97 F | WEIGHT: 293 LBS | OXYGEN SATURATION: 96 % | SYSTOLIC BLOOD PRESSURE: 113 MMHG | RESPIRATION RATE: 18 BRPM

## 2022-08-25 DIAGNOSIS — M25.571 ACUTE RIGHT ANKLE PAIN: Primary | ICD-10-CM

## 2022-08-25 LAB — S PYO AG THROAT QL: POSITIVE

## 2022-08-25 PROCEDURE — 87880 STREP A ASSAY W/OPTIC: CPT

## 2022-08-25 PROCEDURE — 73610 X-RAY EXAM OF ANKLE: CPT | Performed by: EMERGENCY MEDICINE

## 2022-08-25 RX ORDER — AMOXICILLIN 875 MG/1
875 TABLET, COATED ORAL 2 TIMES DAILY
Qty: 20 TABLET | Refills: 0 | Status: SHIPPED | OUTPATIENT
Start: 2022-08-25 | End: 2022-09-04

## 2022-08-25 RX ORDER — TRAMADOL HYDROCHLORIDE 50 MG/1
TABLET ORAL EVERY 6 HOURS PRN
Qty: 10 TABLET | Refills: 0 | Status: SHIPPED | OUTPATIENT
Start: 2022-08-25 | End: 2022-08-30

## 2022-08-25 RX ORDER — HYDROCODONE BITARTRATE AND ACETAMINOPHEN 5; 325 MG/1; MG/1
1 TABLET ORAL ONCE
Status: COMPLETED | OUTPATIENT
Start: 2022-08-25 | End: 2022-08-25

## 2022-08-25 NOTE — ED INITIAL ASSESSMENT (HPI)
Pt presents to the ED c/o right ankle pain. Pt states she believes she twisted her ankle during a fall in march.

## 2022-09-21 ENCOUNTER — HOSPITAL ENCOUNTER (OUTPATIENT)
Dept: MRI IMAGING | Facility: HOSPITAL | Age: 22
Discharge: HOME | End: 2022-09-21
Attending: ORTHOPAEDIC SURGERY

## 2022-09-21 DIAGNOSIS — M25.571 CHRONIC PAIN OF RIGHT ANKLE: ICD-10-CM

## 2022-09-21 DIAGNOSIS — G89.29 CHRONIC PAIN OF RIGHT ANKLE: ICD-10-CM

## 2022-09-21 PROCEDURE — 73721 MRI JNT OF LWR EXTRE W/O DYE: CPT | Performed by: ORTHOPAEDIC SURGERY

## 2022-10-04 ENCOUNTER — TELEPHONE (OUTPATIENT)
Dept: FAMILY MEDICINE CLINIC | Facility: CLINIC | Age: 22
End: 2022-10-04

## 2022-10-04 NOTE — TELEPHONE ENCOUNTER
Patient called (identified name and ),   Patient has been seen by Ortho for right ankle pain. Still has pain and bruising. Has been to 2 Orthopedics. Wants to see Dr Jaime Roland for continued symptoms before making another Ortho appointment.   Appointment scheduled per patient request.    Future Appointments   Date Time Provider Anaya Simon   10/5/2022  5:00 PM Rizwan Reed MD [facility]

## 2022-10-05 ENCOUNTER — OFFICE VISIT (OUTPATIENT)
Dept: FAMILY MEDICINE CLINIC | Facility: CLINIC | Age: 22
End: 2022-10-05
Payer: MEDICAID

## 2022-10-05 VITALS
HEART RATE: 71 BPM | HEIGHT: 66 IN | SYSTOLIC BLOOD PRESSURE: 114 MMHG | DIASTOLIC BLOOD PRESSURE: 75 MMHG | WEIGHT: 293 LBS | BODY MASS INDEX: 47.09 KG/M2 | TEMPERATURE: 98 F

## 2022-10-05 DIAGNOSIS — E66.01 MORBID OBESITY WITH BMI OF 60.0-69.9, ADULT (HCC): ICD-10-CM

## 2022-10-05 DIAGNOSIS — M25.571 CHRONIC PAIN OF RIGHT ANKLE: Primary | ICD-10-CM

## 2022-10-05 DIAGNOSIS — S99.911S INJURY OF RIGHT ANKLE, SEQUELA: ICD-10-CM

## 2022-10-05 DIAGNOSIS — G89.29 CHRONIC PAIN OF RIGHT ANKLE: Primary | ICD-10-CM

## 2022-10-05 PROBLEM — S99.911A INJURY OF RIGHT ANKLE: Status: ACTIVE | Noted: 2022-10-05

## 2022-10-05 PROCEDURE — 3074F SYST BP LT 130 MM HG: CPT | Performed by: FAMILY MEDICINE

## 2022-10-05 PROCEDURE — 3008F BODY MASS INDEX DOCD: CPT | Performed by: FAMILY MEDICINE

## 2022-10-05 PROCEDURE — 3078F DIAST BP <80 MM HG: CPT | Performed by: FAMILY MEDICINE

## 2022-10-05 PROCEDURE — 99214 OFFICE O/P EST MOD 30 MIN: CPT | Performed by: FAMILY MEDICINE

## 2022-10-05 RX ORDER — NAPROXEN 500 MG/1
500 TABLET ORAL 2 TIMES DAILY PRN
Qty: 60 TABLET | Refills: 0 | Status: SHIPPED | OUTPATIENT
Start: 2022-10-05

## 2022-10-21 ENCOUNTER — HOSPITAL ENCOUNTER (OUTPATIENT)
Age: 22
Discharge: HOME | End: 2022-10-21
Payer: MEDICAID

## 2022-10-21 VITALS
HEART RATE: 77 BPM | TEMPERATURE: 97 F | DIASTOLIC BLOOD PRESSURE: 78 MMHG | RESPIRATION RATE: 16 BRPM | SYSTOLIC BLOOD PRESSURE: 122 MMHG | OXYGEN SATURATION: 99 %

## 2022-10-21 DIAGNOSIS — L02.411 ABSCESS OF RIGHT AXILLA: Primary | ICD-10-CM

## 2022-10-21 PROCEDURE — 99213 OFFICE O/P EST LOW 20 MIN: CPT | Performed by: NURSE PRACTITIONER

## 2022-10-21 PROCEDURE — 10060 I&D ABSCESS SIMPLE/SINGLE: CPT | Performed by: NURSE PRACTITIONER

## 2022-10-21 RX ORDER — CLINDAMYCIN HYDROCHLORIDE 300 MG/1
300 CAPSULE ORAL 3 TIMES DAILY
Qty: 30 CAPSULE | Refills: 0 | Status: SHIPPED | OUTPATIENT
Start: 2022-10-21 | End: 2022-10-31

## 2022-11-01 ENCOUNTER — HOSPITAL ENCOUNTER (OUTPATIENT)
Age: 22
Discharge: HOME | End: 2022-11-01
Payer: MEDICAID

## 2022-11-01 VITALS
BODY MASS INDEX: 48.82 KG/M2 | SYSTOLIC BLOOD PRESSURE: 150 MMHG | RESPIRATION RATE: 20 BRPM | HEIGHT: 65 IN | DIASTOLIC BLOOD PRESSURE: 71 MMHG | OXYGEN SATURATION: 100 % | TEMPERATURE: 97 F | HEART RATE: 79 BPM | WEIGHT: 293 LBS

## 2022-11-01 DIAGNOSIS — J45.21 MILD INTERMITTENT ASTHMA WITH EXACERBATION: ICD-10-CM

## 2022-11-01 DIAGNOSIS — R06.02 SHORTNESS OF BREATH: Primary | ICD-10-CM

## 2022-11-01 DIAGNOSIS — J45.20 MILD INTERMITTENT ASTHMA WITHOUT COMPLICATION: ICD-10-CM

## 2022-11-01 LAB — SARS-COV-2 RNA RESP QL NAA+PROBE: NOT DETECTED

## 2022-11-01 PROCEDURE — 99213 OFFICE O/P EST LOW 20 MIN: CPT | Performed by: NURSE PRACTITIONER

## 2022-11-01 PROCEDURE — 94640 AIRWAY INHALATION TREATMENT: CPT | Performed by: NURSE PRACTITIONER

## 2022-11-01 PROCEDURE — U0002 COVID-19 LAB TEST NON-CDC: HCPCS | Performed by: NURSE PRACTITIONER

## 2022-11-01 RX ORDER — ALBUTEROL SULFATE 90 UG/1
2 AEROSOL, METERED RESPIRATORY (INHALATION) EVERY 4 HOURS PRN
Qty: 1 EACH | Refills: 1 | Status: SHIPPED | OUTPATIENT
Start: 2022-11-01

## 2022-11-01 RX ORDER — PREDNISONE 20 MG/1
60 TABLET ORAL ONCE
Status: COMPLETED | OUTPATIENT
Start: 2022-11-01 | End: 2022-11-01

## 2022-11-01 RX ORDER — ALBUTEROL SULFATE 2.5 MG/3ML
2.5 SOLUTION RESPIRATORY (INHALATION) EVERY 4 HOURS PRN
Qty: 90 ML | Refills: 0 | Status: SHIPPED | OUTPATIENT
Start: 2022-11-01

## 2022-11-01 RX ORDER — PREDNISONE 20 MG/1
40 TABLET ORAL DAILY
Qty: 10 TABLET | Refills: 0 | Status: SHIPPED | OUTPATIENT
Start: 2022-11-01 | End: 2022-11-06

## 2022-11-01 RX ORDER — IPRATROPIUM BROMIDE AND ALBUTEROL SULFATE 2.5; .5 MG/3ML; MG/3ML
3 SOLUTION RESPIRATORY (INHALATION) ONCE
Status: COMPLETED | OUTPATIENT
Start: 2022-11-01 | End: 2022-11-01

## 2022-11-01 NOTE — DISCHARGE INSTRUCTIONS
COVID test is negative. Use the nebulizer or the rescue inhaler as needed for cough, wheezing, shortness of breath. Take the prednisone daily starting tomorrow.   Follow-up with your primary doctor

## 2022-11-14 ENCOUNTER — OFFICE VISIT (OUTPATIENT)
Dept: ORTHOPEDICS CLINIC | Facility: CLINIC | Age: 22
End: 2022-11-14
Payer: MEDICAID

## 2022-11-14 VITALS — HEIGHT: 65 IN | BODY MASS INDEX: 48.82 KG/M2 | WEIGHT: 293 LBS

## 2022-11-14 DIAGNOSIS — M19.079 OSTEOARTHRITIS OF TALONAVICULAR JOINT: ICD-10-CM

## 2022-11-14 DIAGNOSIS — S93.491D TEAR OF TALOFIBULAR LIGAMENT OF RIGHT LOWER EXTREMITY, SUBSEQUENT ENCOUNTER: Primary | ICD-10-CM

## 2022-11-23 ENCOUNTER — OFFICE VISIT (OUTPATIENT)
Dept: ORTHOPEDICS CLINIC | Facility: CLINIC | Age: 22
End: 2022-11-23
Payer: MEDICAID

## 2022-11-23 DIAGNOSIS — M65.9 SYNOVITIS OF RIGHT ANKLE: ICD-10-CM

## 2022-11-23 DIAGNOSIS — E66.01 MORBID OBESITY WITH BMI OF 60.0-69.9, ADULT (HCC): ICD-10-CM

## 2022-11-23 DIAGNOSIS — M84.374A STRESS FRACTURE OF NAVICULAR BONE OF RIGHT FOOT: ICD-10-CM

## 2022-11-23 DIAGNOSIS — S93.491D TEAR OF TALOFIBULAR LIGAMENT OF RIGHT LOWER EXTREMITY, SUBSEQUENT ENCOUNTER: Primary | ICD-10-CM

## 2022-11-23 PROCEDURE — 99214 OFFICE O/P EST MOD 30 MIN: CPT | Performed by: ORTHOPAEDIC SURGERY

## 2022-11-23 NOTE — PROGRESS NOTES
Patient presents with:  New Patient: Right ankle pain, patient was referred by Dr Jacy Perez. Xrays taken on 8/25/22, MRI was completed on 9/21/22. Patient rates pain 6/10 today in the office, denies any fever or chills, numbness/tingling. Patient states that she has had this pain for the past 6 months. 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 She has constant pain in her ankle with walking and instability. She has worn a CAM boot, and undergone PT. Physical Exam  Gen: NAD, alert, answering questions appropriately  HEENT: NCAT, EOMI  Lungs: NL breathing, symmetrical lung expansion  Abd: Soft, ND  Extremities:   R Ankle with neutral alignment, DF/PF/EHL intact, SILT, cap refill brisk  TTP ATFL with positive anterior drawer, TTP navicular    Imaging seen and reviewed by me:   3v NWB of the right ankle demonstrating neutral alignment of the ankle, sclerosis of the navicular, joint spaces well maintained NWB. R ankle MRI demonstrating chronic tear of ATFL, fissuring of the navicular articular surface with large subchondral ganglion cyst    Assessment/Plan: 23year old year old female presenting with chronic ATFL tear, ankle synovitis, navicular subchondral cyst with stress reaction, and ankle instability. We discussed conservative and surgical treatment options. She has undergone extensive conservative management and would like to consider surgery. We also discussed weight loss as that can contribute to improved outcomes. The risks, benefits, and alternatives were explained with the patient and she verbalizes understanding. The surgery we discussed right ankle arthroscopy with synovectomy, secondary Brostrom repair, subcondroplasty navicular, possible bone graft calcaneus. No guarantees were given.   In the meantime she will continue home exercise program which was given today and may take Tylenol 1000 mg every 6 hours as needed for pain. Patient is agreeable with the plan.     Darshan Elaine, DO  11/23/22

## 2022-11-24 ENCOUNTER — HOSPITAL ENCOUNTER (OUTPATIENT)
Age: 22
Discharge: HOME | End: 2022-11-24
Payer: MEDICAID

## 2022-11-24 VITALS
HEART RATE: 113 BPM | TEMPERATURE: 99 F | DIASTOLIC BLOOD PRESSURE: 81 MMHG | RESPIRATION RATE: 26 BRPM | OXYGEN SATURATION: 100 % | SYSTOLIC BLOOD PRESSURE: 161 MMHG

## 2022-11-24 DIAGNOSIS — J45.41 MODERATE PERSISTENT ASTHMA WITH EXACERBATION: ICD-10-CM

## 2022-11-24 DIAGNOSIS — R06.02 SHORTNESS OF BREATH: Primary | ICD-10-CM

## 2022-11-24 LAB — SARS-COV-2 RNA RESP QL NAA+PROBE: NOT DETECTED

## 2022-11-24 RX ORDER — IPRATROPIUM BROMIDE AND ALBUTEROL SULFATE 2.5; .5 MG/3ML; MG/3ML
3 SOLUTION RESPIRATORY (INHALATION) ONCE
Status: COMPLETED | OUTPATIENT
Start: 2022-11-24 | End: 2022-11-24

## 2022-11-24 RX ORDER — PREDNISONE 20 MG/1
60 TABLET ORAL ONCE
Status: COMPLETED | OUTPATIENT
Start: 2022-11-24 | End: 2022-11-24

## 2022-11-24 NOTE — DISCHARGE INSTRUCTIONS
Continue to use your nebulizer at home as needed for asthma symptoms.   Follow-up with your primary doctor

## 2022-11-25 DIAGNOSIS — J45.21 MILD INTERMITTENT ASTHMA WITH EXACERBATION: ICD-10-CM

## 2022-11-25 RX ORDER — ALBUTEROL SULFATE 2.5 MG/3ML
2.5 SOLUTION RESPIRATORY (INHALATION) EVERY 4 HOURS PRN
Qty: 90 ML | Refills: 2 | Status: SHIPPED | OUTPATIENT
Start: 2022-11-25

## 2022-11-25 NOTE — TELEPHONE ENCOUNTER
Verified name and . Patient calling to request refill on albuterol nebulizer. Medication pended- routing though protocol.

## 2022-11-25 NOTE — TELEPHONE ENCOUNTER
Refill passed per [facility] protocol. Requested Prescriptions   Pending Prescriptions Disp Refills    albuterol (2.5 MG/3ML) 0.083% Inhalation Nebu Soln 90 mL 0     Sig: Take 3 mL (2.5 mg total) by nebulization every 4 (four) hours as needed for Wheezing or Shortness of Breath.        Asthma & COPD Medication Protocol Passed - 11/25/2022 10:32 AM        Passed - In person appointment or virtual visit in the past 6 mos or appointment in next 3 mos     Recent Outpatient Visits              2 days ago Tear of talofibular ligament of right lower extremity, subsequent encounter    [facility], [address] [address], Orthopedics Soraida Fonseca, DO    Office Visit    1 week ago Tear of talofibular ligament of right lower extremity, subsequent encounter    [facility], [address] [facility] [address], [place] [facility], Sudheer Ray MD    Office Visit    1 month ago Chronic pain of right ankle    [facility], [address] [facility] [place]    Office Visit    3 months ago Chronic pain of right ankle    [facility], [address] [facility] [address], Judi Andrews MD    Office Visit    3 months ago Acute right ankle pain    [address] Niru Quarles, [address] [facility] Appointments         Provider Department Appt Notes    In 2 [place], [address] [address] [place], [address] , [address] [facility]  [facility] [place] [address]                     Recent Outpatient Visits              2 days ago Tear of talofibular ligament of right lower extremity, subsequent encounter    [facility], [address] [address], [facility], DO    Office Visit    1 week ago Tear of talofibular ligament of right lower extremity, subsequent encounter    [facility] [place], Sudheer Ray MD    Office Visit    1 month ago Chronic pain of right ankle    Franca Cross MD Office Visit    3 months ago Chronic pain of right ankle    [facility], [address] [facility] [address] [facility] [address], Kristina Handy MD    Office Visit    3 months ago Acute right ankle pain    [address] [facility], [address] [facility], [facility]    Telemedicine            Future Appointments         Provider Department Appt Notes    In 2 months Myronkelsey [place], [address] [facility], [address] [address]

## 2022-11-28 ENCOUNTER — PATIENT MESSAGE (OUTPATIENT)
Dept: FAMILY MEDICINE CLINIC | Facility: CLINIC | Age: 22
End: 2022-11-28

## 2022-11-28 ENCOUNTER — TELEPHONE (OUTPATIENT)
Dept: ORTHOPEDICS CLINIC | Facility: CLINIC | Age: 22
End: 2022-11-28

## 2022-11-28 ENCOUNTER — HOSPITAL ENCOUNTER (OUTPATIENT)
Age: 22
Discharge: HOME | End: 2022-11-28
Payer: MEDICAID

## 2022-11-28 VITALS
SYSTOLIC BLOOD PRESSURE: 144 MMHG | OXYGEN SATURATION: 97 % | HEART RATE: 91 BPM | RESPIRATION RATE: 18 BRPM | TEMPERATURE: 97 F | DIASTOLIC BLOOD PRESSURE: 87 MMHG

## 2022-11-28 DIAGNOSIS — S93.491D TEAR OF TALOFIBULAR LIGAMENT OF RIGHT LOWER EXTREMITY, SUBSEQUENT ENCOUNTER: Primary | ICD-10-CM

## 2022-11-28 DIAGNOSIS — M84.374A STRESS FRACTURE OF NAVICULAR BONE OF RIGHT FOOT: ICD-10-CM

## 2022-11-28 DIAGNOSIS — M65.9 SYNOVITIS OF RIGHT ANKLE: ICD-10-CM

## 2022-11-28 DIAGNOSIS — R06.2 WHEEZING: ICD-10-CM

## 2022-11-28 DIAGNOSIS — E66.01 MORBID OBESITY WITH BMI OF 60.0-69.9, ADULT (HCC): ICD-10-CM

## 2022-11-28 DIAGNOSIS — J06.9 VIRAL UPPER RESPIRATORY TRACT INFECTION: Primary | ICD-10-CM

## 2022-11-28 DIAGNOSIS — Z20.822 LAB TEST NEGATIVE FOR COVID-19 VIRUS: ICD-10-CM

## 2022-11-28 LAB — SARS-COV-2 RNA RESP QL NAA+PROBE: NOT DETECTED

## 2022-11-28 PROCEDURE — 99213 OFFICE O/P EST LOW 20 MIN: CPT | Performed by: NURSE PRACTITIONER

## 2022-11-28 PROCEDURE — U0002 COVID-19 LAB TEST NON-CDC: HCPCS | Performed by: NURSE PRACTITIONER

## 2022-11-28 RX ORDER — BENZONATATE 100 MG/1
100 CAPSULE ORAL 3 TIMES DAILY PRN
Qty: 15 CAPSULE | Refills: 0 | Status: SHIPPED | OUTPATIENT
Start: 2022-11-28 | End: 2022-12-03 | Stop reason: ALTCHOICE

## 2022-11-28 RX ORDER — PREDNISONE 20 MG/1
40 TABLET ORAL ONCE
Status: COMPLETED | OUTPATIENT
Start: 2022-11-28 | End: 2022-11-28

## 2022-11-28 RX ORDER — PREDNISONE 20 MG/1
40 TABLET ORAL DAILY
Qty: 8 TABLET | Refills: 0 | Status: SHIPPED | OUTPATIENT
Start: 2022-11-29 | End: 2022-12-03 | Stop reason: ALTCHOICE

## 2022-11-28 NOTE — TELEPHONE ENCOUNTER
Type of surgery:  Right Ankle Arthroscopy with Synovectomy, Secondary Brostrom Repair, Subcondroplasty Navicular, Possible Bone Graft Calcaneus  Date: 1/9/23  Location: [facility]  Medical Clearance:      *Medical: Yes      *Dental: No      *Other:  Prior Authorization Status: Pending  Workers Comp:  [place]asheed:  [place]: Yes  POV: 1/25/23

## 2022-11-28 NOTE — TELEPHONE ENCOUNTER
Spoke with Anne to offer surgery dates. She chose 1/9/23. She was informed that she must have medical clearance within 30 days prior to her surgery.

## 2022-11-28 NOTE — ED INITIAL ASSESSMENT (HPI)
Pt reports cough, asthma exacerbation, body aches/fever beginning Thanksgiving. Using nebulizer at home, not today. Speaking in clear sentences.

## 2022-11-28 NOTE — DISCHARGE INSTRUCTIONS
Follow-up with your primary care provider in 2 to 3 days. Continue your neb treatments at home take the next dose of prednisone tomorrow take the benzonatate as needed to help with the cough. Recommend over-the-counter Zyrtec to help with any congestion runny nose or postnasal drip. Drink plenty of fluids warm tea with honey. Take ibuprofen or Tylenol for pain if you develop any new or worsening symptoms go to the nearest emergency department.

## 2022-11-29 ENCOUNTER — TELEPHONE (OUTPATIENT)
Dept: ORTHOPEDICS CLINIC | Facility: CLINIC | Age: 22
End: 2022-11-29

## 2022-11-29 NOTE — TELEPHONE ENCOUNTER
Per pt needs note for work stating date that she was seen for OV, stating surgery is needed, date of surgery, and body part where surgery will be done, note can be put on mychart. Please advise thank you.

## 2022-11-30 ENCOUNTER — TELEPHONE (OUTPATIENT)
Dept: FAMILY MEDICINE CLINIC | Facility: CLINIC | Age: 22
End: 2022-11-30

## 2022-11-30 DIAGNOSIS — J45.21 MILD INTERMITTENT ASTHMA WITH EXACERBATION: ICD-10-CM

## 2022-11-30 NOTE — TELEPHONE ENCOUNTER
Verified name and . Patient requesting refill for albuterol nebulizing solution- she states she is aware that she was sent prescription on  with 2 refills- she states that she has one refill left but is requesting another prescription to have on her profile in case she needs it. Medication pended- routing through protocol.

## 2022-12-02 ENCOUNTER — NURSE TRIAGE (OUTPATIENT)
Dept: FAMILY MEDICINE CLINIC | Facility: CLINIC | Age: 22
End: 2022-12-02

## 2022-12-02 RX ORDER — ALBUTEROL SULFATE 2.5 MG/3ML
2.5 SOLUTION RESPIRATORY (INHALATION) EVERY 4 HOURS PRN
Qty: 90 ML | Refills: 2 | Status: SHIPPED | OUTPATIENT
Start: 2022-12-02

## 2022-12-03 ENCOUNTER — TELEPHONE (OUTPATIENT)
Dept: FAMILY MEDICINE CLINIC | Facility: CLINIC | Age: 22
End: 2022-12-03

## 2022-12-03 ENCOUNTER — TELEMEDICINE (OUTPATIENT)
Dept: FAMILY MEDICINE CLINIC | Facility: CLINIC | Age: 22
End: 2022-12-03

## 2022-12-03 DIAGNOSIS — R05.1 ACUTE COUGH: Primary | ICD-10-CM

## 2022-12-03 DIAGNOSIS — J45.21 MILD INTERMITTENT ASTHMA WITH EXACERBATION: ICD-10-CM

## 2022-12-03 DIAGNOSIS — J11.1 INFLUENZA: ICD-10-CM

## 2022-12-03 RX ORDER — BUDESONIDE AND FORMOTEROL FUMARATE DIHYDRATE 160; 4.5 UG/1; UG/1
2 AEROSOL RESPIRATORY (INHALATION) 2 TIMES DAILY
Qty: 1 EACH | Refills: 0 | Status: SHIPPED | OUTPATIENT
Start: 2022-12-03

## 2022-12-03 RX ORDER — MOMETASONE FUROATE AND FORMOTEROL FUMARATE DIHYDRATE 200; 5 UG/1; UG/1
2 AEROSOL RESPIRATORY (INHALATION) 2 TIMES DAILY
Qty: 1 EACH | Refills: 3 | Status: SHIPPED | OUTPATIENT
Start: 2022-12-03

## 2022-12-03 NOTE — TELEPHONE ENCOUNTER
Please double book the 8.30 am for video visit .  (12/3/)  Please inform patient   Or if she wants to keep [facility] appointment

## 2022-12-03 NOTE — TELEPHONE ENCOUNTER
Appointment made.      Future Appointments   Date Time Provider Anaya Simon   12/3/2022  8:30 AM Pradeep Perez MD [facility]   12/12/2022  2:30 PM Alex Calle MD [facility]   1/25/2023  1:15 PM DO [facility] [place]   3/16/2023 10:20 AM SHEBA Hammer [facility] [place] - [address]

## 2022-12-12 ENCOUNTER — LAB ENCOUNTER (OUTPATIENT)
Dept: LAB | Age: 22
End: 2022-12-12
Attending: FAMILY MEDICINE
Payer: MEDICAID

## 2022-12-12 ENCOUNTER — HOSPITAL ENCOUNTER (OUTPATIENT)
Dept: GENERAL RADIOLOGY | Age: 22
Discharge: HOME | End: 2022-12-12
Attending: FAMILY MEDICINE
Payer: MEDICAID

## 2022-12-12 ENCOUNTER — OFFICE VISIT (OUTPATIENT)
Dept: FAMILY MEDICINE CLINIC | Facility: CLINIC | Age: 22
End: 2022-12-12
Payer: MEDICAID

## 2022-12-12 VITALS
SYSTOLIC BLOOD PRESSURE: 121 MMHG | HEIGHT: 65 IN | HEART RATE: 71 BPM | DIASTOLIC BLOOD PRESSURE: 78 MMHG | BODY MASS INDEX: 48.82 KG/M2 | OXYGEN SATURATION: 98 % | WEIGHT: 293 LBS

## 2022-12-12 DIAGNOSIS — Z01.818 PREOP EXAMINATION: ICD-10-CM

## 2022-12-12 DIAGNOSIS — M84.374A STRESS FRACTURE OF NAVICULAR BONE OF RIGHT FOOT: ICD-10-CM

## 2022-12-12 DIAGNOSIS — S93.491D TEAR OF TALOFIBULAR LIGAMENT OF RIGHT LOWER EXTREMITY, SUBSEQUENT ENCOUNTER: Primary | ICD-10-CM

## 2022-12-12 DIAGNOSIS — M65.9 SYNOVITIS OF RIGHT ANKLE: ICD-10-CM

## 2022-12-12 DIAGNOSIS — J45.40 MODERATE PERSISTENT ASTHMA WITHOUT COMPLICATION: ICD-10-CM

## 2022-12-12 DIAGNOSIS — R73.03 PREDIABETES: ICD-10-CM

## 2022-12-12 PROBLEM — S93.491A TEAR OF TALOFIBULAR LIGAMENT OF RIGHT LOWER EXTREMITY: Status: ACTIVE | Noted: 2022-12-12

## 2022-12-12 PROBLEM — M65.971 SYNOVITIS OF RIGHT ANKLE: Status: ACTIVE | Noted: 2022-12-12

## 2022-12-12 LAB
ALBUMIN SERPL-MCNC: 3.1 G/DL (ref 3.4–5)
ALBUMIN/GLOB SERPL: 0.7 {RATIO} (ref 1–2)
ALP LIVER SERPL-CCNC: 89 U/L
ALT SERPL-CCNC: 37 U/L
ANION GAP SERPL CALC-SCNC: 6 MMOL/L (ref 0–18)
AST SERPL-CCNC: 28 U/L (ref 15–37)
BASOPHILS # BLD AUTO: 0.01 X10(3) UL (ref 0–0.2)
BASOPHILS NFR BLD AUTO: 0.2 %
BILIRUB SERPL-MCNC: 0.4 MG/DL (ref 0.1–2)
BUN BLD-MCNC: 12 MG/DL (ref 7–18)
BUN/CREAT SERPL: 14.5 (ref 10–20)
CALCIUM BLD-MCNC: 9.1 MG/DL (ref 8.5–10.1)
CHLORIDE SERPL-SCNC: 105 MMOL/L (ref 98–112)
CO2 SERPL-SCNC: 27 MMOL/L (ref 21–32)
CREAT BLD-MCNC: 0.83 MG/DL
DEPRECATED RDW RBC AUTO: 46.7 FL (ref 35.1–46.3)
EOSINOPHIL # BLD AUTO: 0.03 X10(3) UL (ref 0–0.7)
EOSINOPHIL NFR BLD AUTO: 0.5 %
ERYTHROCYTE [DISTWIDTH] IN BLOOD BY AUTOMATED COUNT: 15.6 % (ref 11–15)
EST. AVERAGE GLUCOSE BLD GHB EST-MCNC: 114 MG/DL (ref 68–126)
FASTING STATUS PATIENT QL REPORTED: YES
GFR SERPLBLD BASED ON 1.73 SQ M-ARVRAT: 101 ML/MIN/1.73M2 (ref 60–?)
GLOBULIN PLAS-MCNC: 4.7 G/DL (ref 2.8–4.4)
GLUCOSE BLD-MCNC: 100 MG/DL (ref 70–99)
HBA1C MFR BLD: 5.6 % (ref ?–5.7)
HCG SERPL QL: NEGATIVE
HCT VFR BLD AUTO: 39.2 %
HGB BLD-MCNC: 12.3 G/DL
IMM GRANULOCYTES # BLD AUTO: 0.01 X10(3) UL (ref 0–1)
IMM GRANULOCYTES NFR BLD: 0.2 %
LYMPHOCYTES # BLD AUTO: 2.39 X10(3) UL (ref 1–4)
LYMPHOCYTES NFR BLD AUTO: 39.2 %
MCH RBC QN AUTO: 26 PG (ref 26–34)
MCHC RBC AUTO-ENTMCNC: 31.4 G/DL (ref 31–37)
MCV RBC AUTO: 82.9 FL
MONOCYTES # BLD AUTO: 0.45 X10(3) UL (ref 0.1–1)
MONOCYTES NFR BLD AUTO: 7.4 %
NEUTROPHILS # BLD AUTO: 3.21 X10 (3) UL (ref 1.5–7.7)
NEUTROPHILS # BLD AUTO: 3.21 X10(3) UL (ref 1.5–7.7)
NEUTROPHILS NFR BLD AUTO: 52.5 %
OSMOLALITY SERPL CALC.SUM OF ELEC: 286 MOSM/KG (ref 275–295)
PLATELET # BLD AUTO: 390 10(3)UL (ref 150–450)
POTASSIUM SERPL-SCNC: 3.9 MMOL/L (ref 3.5–5.1)
PROT SERPL-MCNC: 7.8 G/DL (ref 6.4–8.2)
RBC # BLD AUTO: 4.73 X10(6)UL
SODIUM SERPL-SCNC: 138 MMOL/L (ref 136–145)
WBC # BLD AUTO: 6.1 X10(3) UL (ref 4–11)

## 2022-12-12 PROCEDURE — 83036 HEMOGLOBIN GLYCOSYLATED A1C: CPT

## 2022-12-12 PROCEDURE — 93010 ELECTROCARDIOGRAM REPORT: CPT | Performed by: INTERNAL MEDICINE

## 2022-12-12 PROCEDURE — 80053 COMPREHEN METABOLIC PANEL: CPT

## 2022-12-12 PROCEDURE — 85025 COMPLETE CBC W/AUTO DIFF WBC: CPT

## 2022-12-12 PROCEDURE — 71046 X-RAY EXAM CHEST 2 VIEWS: CPT | Performed by: FAMILY MEDICINE

## 2022-12-12 PROCEDURE — 36415 COLL VENOUS BLD VENIPUNCTURE: CPT

## 2022-12-12 PROCEDURE — 84703 CHORIONIC GONADOTROPIN ASSAY: CPT

## 2022-12-12 PROCEDURE — 93005 ELECTROCARDIOGRAM TRACING: CPT

## 2022-12-12 RX ORDER — FLUTICASONE PROPIONATE AND SALMETEROL 250; 50 UG/1; UG/1
1 POWDER RESPIRATORY (INHALATION) EVERY 12 HOURS SCHEDULED
Qty: 3 EACH | Refills: 1 | Status: SHIPPED | OUTPATIENT
Start: 2022-12-12

## 2022-12-13 DIAGNOSIS — R94.31 ABNORMAL EKG: Primary | ICD-10-CM

## 2022-12-13 DIAGNOSIS — Z01.818 PREOPERATIVE CLEARANCE: ICD-10-CM

## 2022-12-13 DIAGNOSIS — J45.40 MODERATE PERSISTENT ASTHMA WITHOUT COMPLICATION: Primary | ICD-10-CM

## 2022-12-13 LAB
ATRIAL RATE: 61 BPM
P AXIS: 34 DEGREES
P-R INTERVAL: 156 MS
Q-T INTERVAL: 398 MS
QRS DURATION: 80 MS
QTC CALCULATION (BEZET): 400 MS
R AXIS: 48 DEGREES
T AXIS: 29 DEGREES
VENTRICULAR RATE: 61 BPM

## 2022-12-13 NOTE — TELEPHONE ENCOUNTER
Prior authorization for fluticasone-salmeterol  was done through [employer].  It can take 1-5 business days for a decision to come back

## 2022-12-14 NOTE — TELEPHONE ENCOUNTER
Patient never seen by pulmonary, there is no record of pulmo doctor prescribing any Rx for patient from chart review. Patient's insurance may prefer different drug: looks like Symbicort from TE 12/3/22.

## 2022-12-15 NOTE — TELEPHONE ENCOUNTER
Dr Severo Molina please see reply from Pulmonary dept, would you like to send Symbicort?      Rx pended, please sign if appropiate

## 2022-12-17 RX ORDER — BUDESONIDE AND FORMOTEROL FUMARATE DIHYDRATE 80; 4.5 UG/1; UG/1
2 AEROSOL RESPIRATORY (INHALATION) 2 TIMES DAILY
Qty: 20.7 EACH | Refills: 1 | Status: SHIPPED | OUTPATIENT
Start: 2022-12-17

## 2022-12-20 ENCOUNTER — TELEPHONE (OUTPATIENT)
Dept: FAMILY MEDICINE CLINIC | Facility: CLINIC | Age: 22
End: 2022-12-20

## 2022-12-20 NOTE — TELEPHONE ENCOUNTER
Spoke to patient. She wanted to know if the office received her Short Term Disability paperwork from [place]. She also said that she needs the paperwork to state that she was off from 11/23-12/5. Explained that the forms department is tasked with completing paperwork and they go by what is in the provider notes. [facility], please advise if you have received short term disability paperwork from [place] for patient. If you haven't, please inform patient.

## 2022-12-21 ENCOUNTER — TELEPHONE (OUTPATIENT)
Dept: FAMILY MEDICINE CLINIC | Facility: CLINIC | Age: 22
End: 2022-12-21

## 2022-12-21 NOTE — TELEPHONE ENCOUNTER
If time off is going to be related to her surgery she needs to get the paperwork done through her orthopedic surgeon  Please inform form department

## 2022-12-21 NOTE — TELEPHONE ENCOUNTER
Called pt informed her forms were received and sent to forms dept. Pt stated it is not related to her surgery is due to the days that she was sick.

## 2022-12-29 NOTE — TELEPHONE ENCOUNTER
Dr. Gordo Abreu,     Please sign off on form: fmla for when pt had influenza 11/23/22-12/5/22  -Highlight the patient and hit \"Chart\" button. -In Chart Review, w/in the Encounter tab - click 1 time on the Telephone call encounter for 12/20/22 Scroll down the telephone encounter.  -Click \"scan on\" blue Hyperlink under \"Media\" heading for Fmla Dr Gordo Abreu 12/29/22 w/in the telephone enc.  -Click on Acknowledge button at the bottom right corner and left-click onto image, signature stamp appears and drag signature to Provider signature line. Stamp will turn blue. Close window. Thank you,    Randal Spears.

## 2023-01-03 ENCOUNTER — TELEPHONE (OUTPATIENT)
Dept: ORTHOPEDICS CLINIC | Facility: CLINIC | Age: 23
End: 2023-01-03

## 2023-01-03 NOTE — TELEPHONE ENCOUNTER
Pt will be having surgery on 1/9/23 and is requesting note for work stating she will be off for 6 weeks to be placed on mychart.  Please advise

## 2023-01-04 NOTE — TELEPHONE ENCOUNTER
Do you approve? Prior Authorization Form:      DEMOGRAPHICS:                     Patient Name:  Angie Hernandez  Patient :  1958            Insurance:  Payor: MEDICARE / Plan: MEDICARE PART A AND B / Product Type: *No Product type* /   Insurance ID Number:    Payor/Plan Subscr  Sex Relation Sub. Ins. ID Effective Group Num   1. [id number] [facility] 1958 Male Self [id number] 21                                    [address] [address]   2.  MEDICAL Alex Max 1958 Male Self [id number]                                    [address]         DIAGNOSIS & PROCEDURE:                       Procedure/Operation: lap kei and EGD           CPT Code: 61270 and 37588    Diagnosis:  Symptomatic cholelithiasis    ICD10 Code: K80.20    Location:  [address] [place] [address]    Surgeon:  Tab Lacy INFORMATION:                          Date: 2021    Time:               Anesthesia:                                                         Status:  Outpatient        Special Comments:           Electronically signed by Bre Ahn MA on 2021 at 9:47 AM

## 2023-01-06 ENCOUNTER — LAB ENCOUNTER (OUTPATIENT)
Dept: LAB | Age: 23
End: 2023-01-06
Attending: ORTHOPAEDIC SURGERY
Payer: MEDICAID

## 2023-01-06 DIAGNOSIS — Z01.818 PREOP TESTING: ICD-10-CM

## 2023-01-06 NOTE — TELEPHONE ENCOUNTER
Pt called and states her disab form have been denied. Per pt she was told my dani that forms had been filled out incorrectly and are requesting for new forms to be filled out and sent again. Pt also requested a copy be sent to her to [employer] of previous forms that had been completed. Forms uploaded to pt's mychart.

## 2023-01-06 NOTE — TELEPHONE ENCOUNTER
Regimen: vidaza  Cycle/Day: cycle 10 day 7    Dr hsieh is supervising provider today.    ECO - No physically strenuous activity, but ambulatory and able to carry out light or sedentary work.    Nursing Assessment:   A comprehensive nursing assessment addressing the side-effects of chemotherapy was performed and the patient reports the following:  Nausea: NO  Vomiting: NO  Fever: NO  Chills: NO  Other signs of infection: NO  Bleeding: NO  Mucositis: NO  Diarrhea: NO  Constipation: NO  Anorexia: YES,   Dysuria: NO  Urinary Bleeding: NO  Cough: NO  Shortness of Breath: NO  Fatigue/Weakness: YES,   Numbness/Tingling: NO  Other Neuropathies: NO  Edema: NO  Rash: NO  Hand/Foot Syndrome: NO  Pain: NO  Anxiety/Depression: NO    Pre-Treatment: - Treatment consent signed  - Patient has valid pre-authorization  - VS completed  - BSA double checked  - Premed orders, including hydration, are verified prior to administration  - Treatment parameters verified in patient protocol  - Chemotherapy doses independently doubled checked & verified by two practitioners  - Chemotherapy infusion pump settings are double checked & verified by two practitioners  - Patient is identified by first & last name, Date of birth that has been verified by two practitioners with the patient chairside.    Treatment: Refer to LDA and MAR for line assessment and medication administration  Refer to Toxicity Assessment doc flowsheet   Blood return confirmed before, during and after chemotherapy administered  Infusion pump used for non-vesicant drugs    Post Treatment: Treatment tolerated well; no adverse reaction    Transfusion: Not needed    Education: No new instructions needed    Next appointment scheduled:   Future Appointments  Date Time Provider Department Center   2017 1:30 PM SLMONSL2 LAB [facility]2 [facility]   2017 1:45 PM SLMONSL2 INFUSION RN NIXONSL2 [facility]   5/10/2017 9:30 AM Heriberto Carvalho PTA [id number] B   2017 9:30 AM Heriberto  Forms Dept, please assist    Patient (name and  verified) is calling stating that the [facility] paperwork completed was not filled out correctly according to her employer. Patient was transferred to Forms Dept for assistance. KIM Carvalho MHBP[facility]3 [facility]   5/15/2017 1:30 PM SLMONSL2 LAB SL[facility]2 [facility]   5/15/2017 1:45 PM SLMONSL2 INFUSION RN SLMONSL2 [facility]   5/22/2017 1:15 PM SLMONSL2 LAB SL[facility]2 [facility]   5/22/2017 1:30 PM Jadyn Ray PA-C SLMONSL2 [facility]       Patient instructed to call the office with any questions or concerns.    Patient Discharged: with family member, to home

## 2023-01-07 LAB — SARS-COV-2 RNA RESP QL NAA+PROBE: NOT DETECTED

## 2023-01-09 ENCOUNTER — ANESTHESIA (OUTPATIENT)
Dept: SURGERY | Facility: HOSPITAL | Age: 23
End: 2023-01-09
Payer: MEDICAID

## 2023-01-09 ENCOUNTER — ANESTHESIA EVENT (OUTPATIENT)
Dept: SURGERY | Facility: HOSPITAL | Age: 23
End: 2023-01-09
Payer: MEDICAID

## 2023-01-09 ENCOUNTER — HOSPITAL ENCOUNTER (OUTPATIENT)
Facility: HOSPITAL | Age: 23
Setting detail: HOSPITAL OUTPATIENT SURGERY
Discharge: HOME | End: 2023-01-09
Attending: ORTHOPAEDIC SURGERY | Admitting: ORTHOPAEDIC SURGERY
Payer: MEDICAID

## 2023-01-09 ENCOUNTER — APPOINTMENT (OUTPATIENT)
Dept: GENERAL RADIOLOGY | Facility: HOSPITAL | Age: 23
End: 2023-01-09
Attending: ORTHOPAEDIC SURGERY
Payer: MEDICAID

## 2023-01-09 VITALS
WEIGHT: 293 LBS | BODY MASS INDEX: 48.82 KG/M2 | TEMPERATURE: 99 F | HEART RATE: 67 BPM | SYSTOLIC BLOOD PRESSURE: 142 MMHG | HEIGHT: 65 IN | RESPIRATION RATE: 14 BRPM | OXYGEN SATURATION: 96 % | DIASTOLIC BLOOD PRESSURE: 80 MMHG

## 2023-01-09 DIAGNOSIS — Z01.818 PREOP TESTING: Primary | ICD-10-CM

## 2023-01-09 LAB
B-HCG UR QL: NEGATIVE
HBV SURFACE AG SER-ACNC: <0.1 [IU]/L
HBV SURFACE AG SERPL QL IA: NONREACTIVE
HCV AB SERPL QL IA: NONREACTIVE
HIV 1+2 AB+HIV1 P24 AG SERPL QL IA: NONREACTIVE

## 2023-01-09 PROCEDURE — 81025 URINE PREGNANCY TEST: CPT

## 2023-01-09 PROCEDURE — 76942 ECHO GUIDE FOR BIOPSY: CPT | Performed by: ORTHOPAEDIC SURGERY

## 2023-01-09 PROCEDURE — 0MQQ0ZZ REPAIR RIGHT ANKLE BURSA AND LIGAMENT, OPEN APPROACH: ICD-10-PCS | Performed by: ORTHOPAEDIC SURGERY

## 2023-01-09 PROCEDURE — 64450 NJX AA&/STRD OTHER PN/BRANCH: CPT | Performed by: ORTHOPAEDIC SURGERY

## 2023-01-09 PROCEDURE — 86803 HEPATITIS C AB TEST: CPT | Performed by: ANESTHESIOLOGY

## 2023-01-09 PROCEDURE — 86701 HIV-1ANTIBODY: CPT | Performed by: ANESTHESIOLOGY

## 2023-01-09 PROCEDURE — 0QSL34Z REPOSITION RIGHT TARSAL WITH INTERNAL FIXATION DEVICE, PERCUTANEOUS APPROACH: ICD-10-PCS | Performed by: ORTHOPAEDIC SURGERY

## 2023-01-09 PROCEDURE — 0SBF4ZZ EXCISION OF RIGHT ANKLE JOINT, PERCUTANEOUS ENDOSCOPIC APPROACH: ICD-10-PCS | Performed by: ORTHOPAEDIC SURGERY

## 2023-01-09 PROCEDURE — 76000 FLUOROSCOPY <1 HR PHYS/QHP: CPT | Performed by: ORTHOPAEDIC SURGERY

## 2023-01-09 PROCEDURE — 64999 UNLISTED PX NERVOUS SYSTEM: CPT | Performed by: ORTHOPAEDIC SURGERY

## 2023-01-09 PROCEDURE — 87340 HEPATITIS B SURFACE AG IA: CPT | Performed by: ANESTHESIOLOGY

## 2023-01-09 DEVICE — IMPLANTABLE DEVICE
Type: IMPLANTABLE DEVICE | Site: ANKLE | Status: FUNCTIONAL
Brand: ACCUFILL ® PREFILL

## 2023-01-09 RX ORDER — HYDROMORPHONE HYDROCHLORIDE 1 MG/ML
0.6 INJECTION, SOLUTION INTRAMUSCULAR; INTRAVENOUS; SUBCUTANEOUS EVERY 5 MIN PRN
Status: DISCONTINUED | OUTPATIENT
Start: 2023-01-09 | End: 2023-01-09

## 2023-01-09 RX ORDER — LIDOCAINE HYDROCHLORIDE 10 MG/ML
INJECTION, SOLUTION EPIDURAL; INFILTRATION; INTRACAUDAL; PERINEURAL AS NEEDED
Status: DISCONTINUED | OUTPATIENT
Start: 2023-01-09 | End: 2023-01-09 | Stop reason: SURG

## 2023-01-09 RX ORDER — NEOSTIGMINE METHYLSULFATE 1 MG/ML
INJECTION, SOLUTION INTRAVENOUS AS NEEDED
Status: DISCONTINUED | OUTPATIENT
Start: 2023-01-09 | End: 2023-01-09 | Stop reason: SURG

## 2023-01-09 RX ORDER — ONDANSETRON 2 MG/ML
INJECTION INTRAMUSCULAR; INTRAVENOUS AS NEEDED
Status: DISCONTINUED | OUTPATIENT
Start: 2023-01-09 | End: 2023-01-09 | Stop reason: SURG

## 2023-01-09 RX ORDER — HYDROCODONE BITARTRATE AND ACETAMINOPHEN 5; 325 MG/1; MG/1
1 TABLET ORAL EVERY 6 HOURS PRN
Status: DISCONTINUED | OUTPATIENT
Start: 2023-01-09 | End: 2023-01-09

## 2023-01-09 RX ORDER — SODIUM CHLORIDE 9 MG/ML
INJECTION, SOLUTION INTRAVENOUS CONTINUOUS PRN
Status: DISCONTINUED | OUTPATIENT
Start: 2023-01-09 | End: 2023-01-09 | Stop reason: SURG

## 2023-01-09 RX ORDER — SODIUM CHLORIDE, SODIUM LACTATE, POTASSIUM CHLORIDE, CALCIUM CHLORIDE 600; 310; 30; 20 MG/100ML; MG/100ML; MG/100ML; MG/100ML
INJECTION, SOLUTION INTRAVENOUS CONTINUOUS
Status: DISCONTINUED | OUTPATIENT
Start: 2023-01-09 | End: 2023-01-09

## 2023-01-09 RX ORDER — MORPHINE SULFATE 10 MG/ML
6 INJECTION, SOLUTION INTRAMUSCULAR; INTRAVENOUS EVERY 10 MIN PRN
Status: DISCONTINUED | OUTPATIENT
Start: 2023-01-09 | End: 2023-01-09

## 2023-01-09 RX ORDER — GLYCOPYRROLATE 0.2 MG/ML
INJECTION, SOLUTION INTRAMUSCULAR; INTRAVENOUS AS NEEDED
Status: DISCONTINUED | OUTPATIENT
Start: 2023-01-09 | End: 2023-01-09 | Stop reason: SURG

## 2023-01-09 RX ORDER — HYDROCODONE BITARTRATE AND ACETAMINOPHEN 5; 325 MG/1; MG/1
1 TABLET ORAL EVERY 6 HOURS PRN
Qty: 28 TABLET | Refills: 0 | Status: SHIPPED | OUTPATIENT
Start: 2023-01-09

## 2023-01-09 RX ORDER — HYDROCODONE BITARTRATE AND ACETAMINOPHEN 10; 325 MG/1; MG/1
1 TABLET ORAL EVERY 4 HOURS PRN
Status: DISCONTINUED | OUTPATIENT
Start: 2023-01-09 | End: 2023-01-09

## 2023-01-09 RX ORDER — BUPIVACAINE HYDROCHLORIDE 2.5 MG/ML
INJECTION, SOLUTION EPIDURAL; INFILTRATION; INTRACAUDAL AS NEEDED
Status: DISCONTINUED | OUTPATIENT
Start: 2023-01-09 | End: 2023-01-09 | Stop reason: HOSPADM

## 2023-01-09 RX ORDER — EPHEDRINE SULFATE 50 MG/ML
INJECTION, SOLUTION INTRAVENOUS AS NEEDED
Status: DISCONTINUED | OUTPATIENT
Start: 2023-01-09 | End: 2023-01-09 | Stop reason: SURG

## 2023-01-09 RX ORDER — ROCURONIUM BROMIDE 10 MG/ML
INJECTION, SOLUTION INTRAVENOUS AS NEEDED
Status: DISCONTINUED | OUTPATIENT
Start: 2023-01-09 | End: 2023-01-09 | Stop reason: SURG

## 2023-01-09 RX ORDER — CEFAZOLIN SODIUM IN 0.9 % NACL 3 G/100 ML
3 INTRAVENOUS SOLUTION, PIGGYBACK (ML) INTRAVENOUS ONCE
Status: DISCONTINUED | OUTPATIENT
Start: 2023-01-09 | End: 2023-01-09 | Stop reason: HOSPADM

## 2023-01-09 RX ORDER — HYDROMORPHONE HYDROCHLORIDE 1 MG/ML
0.4 INJECTION, SOLUTION INTRAMUSCULAR; INTRAVENOUS; SUBCUTANEOUS EVERY 5 MIN PRN
Status: DISCONTINUED | OUTPATIENT
Start: 2023-01-09 | End: 2023-01-09

## 2023-01-09 RX ORDER — DEXAMETHASONE SODIUM PHOSPHATE 10 MG/ML
INJECTION, SOLUTION INTRAMUSCULAR; INTRAVENOUS AS NEEDED
Status: DISCONTINUED | OUTPATIENT
Start: 2023-01-09 | End: 2023-01-09 | Stop reason: SURG

## 2023-01-09 RX ORDER — DOCUSATE SODIUM 100 MG/1
100 CAPSULE, LIQUID FILLED ORAL 2 TIMES DAILY PRN
Qty: 20 CAPSULE | Refills: 0 | Status: SHIPPED | OUTPATIENT
Start: 2023-01-09

## 2023-01-09 RX ORDER — ONDANSETRON 2 MG/ML
4 INJECTION INTRAMUSCULAR; INTRAVENOUS EVERY 6 HOURS PRN
Status: DISCONTINUED | OUTPATIENT
Start: 2023-01-09 | End: 2023-01-09

## 2023-01-09 RX ORDER — DEXAMETHASONE SODIUM PHOSPHATE 4 MG/ML
VIAL (ML) INJECTION AS NEEDED
Status: DISCONTINUED | OUTPATIENT
Start: 2023-01-09 | End: 2023-01-09 | Stop reason: SURG

## 2023-01-09 RX ORDER — MIDAZOLAM HYDROCHLORIDE 1 MG/ML
INJECTION INTRAMUSCULAR; INTRAVENOUS AS NEEDED
Status: DISCONTINUED | OUTPATIENT
Start: 2023-01-09 | End: 2023-01-09 | Stop reason: SURG

## 2023-01-09 RX ORDER — HYDROMORPHONE HYDROCHLORIDE 1 MG/ML
0.2 INJECTION, SOLUTION INTRAMUSCULAR; INTRAVENOUS; SUBCUTANEOUS EVERY 5 MIN PRN
Status: DISCONTINUED | OUTPATIENT
Start: 2023-01-09 | End: 2023-01-09

## 2023-01-09 RX ORDER — NALOXONE HYDROCHLORIDE 0.4 MG/ML
80 INJECTION, SOLUTION INTRAMUSCULAR; INTRAVENOUS; SUBCUTANEOUS AS NEEDED
Status: DISCONTINUED | OUTPATIENT
Start: 2023-01-09 | End: 2023-01-09

## 2023-01-09 RX ORDER — ROPIVACAINE HYDROCHLORIDE 5 MG/ML
INJECTION, SOLUTION EPIDURAL; INFILTRATION; PERINEURAL AS NEEDED
Status: DISCONTINUED | OUTPATIENT
Start: 2023-01-09 | End: 2023-01-09 | Stop reason: SURG

## 2023-01-09 RX ORDER — ACETAMINOPHEN 500 MG
1000 TABLET ORAL ONCE
Status: COMPLETED | OUTPATIENT
Start: 2023-01-09 | End: 2023-01-09

## 2023-01-09 RX ORDER — FAMOTIDINE 20 MG/1
20 TABLET, FILM COATED ORAL ONCE
Status: COMPLETED | OUTPATIENT
Start: 2023-01-09 | End: 2023-01-09

## 2023-01-09 RX ORDER — METOCLOPRAMIDE 10 MG/1
10 TABLET ORAL ONCE
Status: COMPLETED | OUTPATIENT
Start: 2023-01-09 | End: 2023-01-09

## 2023-01-09 RX ORDER — MORPHINE SULFATE 4 MG/ML
2 INJECTION, SOLUTION INTRAMUSCULAR; INTRAVENOUS EVERY 10 MIN PRN
Status: DISCONTINUED | OUTPATIENT
Start: 2023-01-09 | End: 2023-01-09

## 2023-01-09 RX ORDER — MORPHINE SULFATE 4 MG/ML
4 INJECTION, SOLUTION INTRAMUSCULAR; INTRAVENOUS EVERY 10 MIN PRN
Status: DISCONTINUED | OUTPATIENT
Start: 2023-01-09 | End: 2023-01-09

## 2023-01-09 RX ADMIN — SODIUM CHLORIDE: 9 INJECTION, SOLUTION INTRAVENOUS at 12:17:00

## 2023-01-09 RX ADMIN — ROPIVACAINE HYDROCHLORIDE 30 ML: 5 INJECTION, SOLUTION EPIDURAL; INFILTRATION; PERINEURAL at 09:34:00

## 2023-01-09 RX ADMIN — SODIUM CHLORIDE: 9 INJECTION, SOLUTION INTRAVENOUS at 09:41:00

## 2023-01-09 RX ADMIN — DEXAMETHASONE SODIUM PHOSPHATE 4 MG: 4 MG/ML VIAL (ML) INJECTION at 11:58:00

## 2023-01-09 RX ADMIN — MIDAZOLAM HYDROCHLORIDE 2 MG: 1 INJECTION INTRAMUSCULAR; INTRAVENOUS at 09:17:00

## 2023-01-09 RX ADMIN — NEOSTIGMINE METHYLSULFATE 3 MG: 1 INJECTION, SOLUTION INTRAVENOUS at 12:06:00

## 2023-01-09 RX ADMIN — ONDANSETRON 4 MG: 2 INJECTION INTRAMUSCULAR; INTRAVENOUS at 11:58:00

## 2023-01-09 RX ADMIN — ROPIVACAINE HYDROCHLORIDE 30 ML: 5 INJECTION, SOLUTION EPIDURAL; INFILTRATION; PERINEURAL at 09:25:00

## 2023-01-09 RX ADMIN — LIDOCAINE HYDROCHLORIDE 50 MG: 10 INJECTION, SOLUTION EPIDURAL; INFILTRATION; INTRACAUDAL; PERINEURAL at 09:46:00

## 2023-01-09 RX ADMIN — DEXAMETHASONE SODIUM PHOSPHATE 10 MG: 10 INJECTION, SOLUTION INTRAMUSCULAR; INTRAVENOUS at 09:25:00

## 2023-01-09 RX ADMIN — ROCURONIUM BROMIDE 10 MG: 10 INJECTION, SOLUTION INTRAVENOUS at 09:46:00

## 2023-01-09 RX ADMIN — ROCURONIUM BROMIDE 30 MG: 10 INJECTION, SOLUTION INTRAVENOUS at 10:12:00

## 2023-01-09 RX ADMIN — EPHEDRINE SULFATE 10 MG: 50 INJECTION, SOLUTION INTRAVENOUS at 09:56:00

## 2023-01-09 RX ADMIN — ROCURONIUM BROMIDE 10 MG: 10 INJECTION, SOLUTION INTRAVENOUS at 10:38:00

## 2023-01-09 RX ADMIN — DEXAMETHASONE SODIUM PHOSPHATE 10 MG: 10 INJECTION, SOLUTION INTRAMUSCULAR; INTRAVENOUS at 09:34:00

## 2023-01-09 RX ADMIN — GLYCOPYRROLATE 0.4 MG: 0.2 INJECTION, SOLUTION INTRAMUSCULAR; INTRAVENOUS at 12:06:00

## 2023-01-09 NOTE — TELEPHONE ENCOUNTER
Forms completed and faxed to [facility] [phone]. Sent [employer].

## 2023-01-09 NOTE — ANESTHESIA PROCEDURE NOTES
Peripheral Block    Date/Time: 1/9/2023 9:17 AM  Performed by: Vasquez Webster MD  Authorized by: Vasquez Webster MD       General Information and Staff    Start Time:  1/9/2023 9:17 AM  End Time:  1/9/2023 9:25 AM  Anesthesiologist:  Vasquez Webster MD  Performed by:   Anesthesiologist  Patient Location:  PACU    Block Placement: Pre Induction  Site Identification: real time ultrasound guided and image stored and retrievable      Reason for Block: at surgeon's request and post-op pain management    Preanesthetic Checklist: 2 patient identifers, IV checked, risks and benefits discussed, monitors and equipment checked, pre-op evaluation, timeout performed, anesthesia consent and sterile technique used      Procedure Details    Patient Position:  Supine  Prep: ChloraPrep    Monitoring:  Cardiac monitor  Block Type:  Saphenous  Laterality:  Right  Injection Technique:  Single-shot    Needle    Needle Type:   Needle Length:  150 mm  Needle Localization:  Anatomical landmarks, nerve stimulator and ultrasound guidance              Assessment    Injection Assessment:  Good spread noted, incremental injection, low pressure, local visualized surrounding nerve on ultrasound, negative aspiration for heme and no pain on injection  Paresthesia Pain:  None  Heart Rate Change: No        Medications  1/9/2023 9:17 AM      Additional Comments

## 2023-01-09 NOTE — H&P
The H&P from 12/12/2022 was reviewed by Nathanael Vazquez DO on 1/9/2023, the patient was examined and no significant changes have occurred in the patient's condition since the H&P was performed. Risks and benefits were discussed, proceed with procedure as planned.

## 2023-01-09 NOTE — ANESTHESIA PROCEDURE NOTES
Peripheral Block    Date/Time: 1/9/2023 9:25 AM  Performed by: Jess Vicente MD  Authorized by: Jess Vicente MD       General Information and Staff    Start Time:  1/9/2023 9:25 AM  End Time:  1/9/2023 9:34 AM  Anesthesiologist:  Jess Vicente MD  Performed by:   Anesthesiologist  Patient Location:  PACU    Block Placement: Pre Induction  Site Identification: real time ultrasound guided and image stored and retrievable      Reason for Block: at surgeon's request and post-op pain management    Preanesthetic Checklist: 2 patient identifers, IV checked, risks and benefits discussed, monitors and equipment checked, pre-op evaluation, timeout performed, anesthesia consent and sterile technique used      Procedure Details    Patient Position:   Prep: ChloraPrep and patient draped    Monitoring:  Cardiac monitor  Block Type:  Popliteal  Injection Technique:  Single-shot    Needle    Needle Type:  Short-bevel  Needle Gauge:  22 G  Needle Length:  90 mm  Needle Localization:  Ultrasound guidance      Nerve Stimulator: 0.6 amps  Muscle Twitch Response: plantarflexion      Assessment    Injection Assessment:  Good spread noted, incremental injection, local visualized surrounding nerve on ultrasound, low pressure, negative aspiration for heme and no pain on injection  Heart Rate Change: No        Medications  1/9/2023 9:25 AM      Additional Comments

## 2023-01-09 NOTE — DISCHARGE INSTRUCTIONS
Non weightbearing/Use crutches or walker   Elevate leg on pillows, may ice 15-20 min 3-4 x day   Keep dressing clean and dry      HOME INSTRUCTIONS  [facility]BS[facility]G HOME CARE INSTRUCTIONS: POST-OP ANESTHESIA  The medication that you received for sedation or general anesthesia can last up to 24 hours. Your judgment and reflexes may be altered, even if you feel like your normal self. We Recommend:   Do not drive any motor vehicle or bicycle   Avoid mowing the lawn, playing sports, or working with power tools/applicances (power saws, electric knives or mixers)   That you have someone stay with you on your first night home   Do not drink alcohol or take sleeping pills or tranquilizers   Do not sign legal documents within 24 hours of your procedure   If you had a nerve block for your surgery, take extra care not to put any pressure on your arm or hand for 24 hours    It is normal:  For you to have a sore throat if you had a breathing tube during surgery (while you were asleep!). The sore throat should get better within 48 hours. You can gargle with warm salt water (1/2 tsp in 4 oz warm water) or use a throat lozenge for comfort  To feel muscle aches or soreness especially in the abdomen, chest or neck. The achy feeling should go away in the next 24 hours  To feel weak, sleepy or \"wiped out\". Your should start feeling better in the next 24 hours. To experience mild discomforts such as sore lip or tongue, headache, cramps, gas pains or a bloated feeling in your abdomen. To experience mild back pain or soreness for a day or two if you had spinal or epidural anesthesia. If you had laparoscopic surgery, to feel shoulder pain or discomfort on the day of surgery. For some patients to have nausea after surgery/anesthesia    If you feel nausea or experience vomiting:   Try to move around less.    Eat less than usual or drink only liquids until the next morning   Nausea should resolve in about 24 hours    If you have a problem when you are at home:    Call your surgeons office

## 2023-01-09 NOTE — TELEPHONE ENCOUNTER
Forms that have been completed were never faxed out by forms reps. Signature had been pending. Faxing completed forms out today.

## 2023-01-09 NOTE — ANESTHESIA PROCEDURE NOTES
Airway  Date/Time: 1/9/2023 9:47 AM  Urgency: Elective      General Information and Staff    Patient location during procedure: OR  Anesthesiologist: Geremias Bronson MD  Performed: anesthesiologist     Indications and Patient Condition  Indications for airway management: anesthesia  Spontaneous ventilation: present  Sedation level: deep  Preoxygenated: yes  Patient position: sniffing  Mask difficulty assessment: 1 - vent by mask    Final Airway Details  Final airway type: endotracheal airway      Successful airway: ETT  Cuffed: yes   Successful intubation technique: direct laryngoscopy  Endotracheal tube insertion site: oral  Blade: Jose  Blade size: #4  ETT size (mm): 7.0    Cormack-Lehane Classification: grade IIB - view of arytenoids or posterior of glottis only  Placement verified by: chest auscultation and capnometry   Measured from: teeth  ETT to teeth (cm): 23  Number of attempts at approach: 1

## 2023-01-09 NOTE — BRIEF OP NOTE
Pre-Operative Diagnosis: Tear of talofibular ligament of right lower extremity, subsequent encounter [S93.491D]  Stress fracture of navicular bone of right foot [M84.374A]  Synovitis of right ankle [M65.9]  Morbid obesity with BMI of 60.0-69.9, adult ([facility]) [E66.01, Z68.44]     Post-Operative Diagnosis: Tear of talofibular ligament of right lower extremity, subsequent encounter [S93.491D]Stress fracture of navicular bone of right foot [M84.374A]Synovitis of right ankle [M65. 9]Morbid obesity with BMI of 60.0-69.9, [facility] [E66.01, Z68.44]      Procedure Performed:   Right ankle arthroscopy with synovectomy, secondary Brostrom repair, subcondroplasty navicular, possible bone graft calcaneus    Surgeon(s) and Role:     Erin Bernard DO - Primary    Assistant(s):  PA: Good Calhoun PA-C     Surgical Findings: atfl tear     Specimen: n/a     Estimated Blood Loss: No data recorded    Dictation Number: [id number]    Nathaniel Garcia DO  1/9/2023  12:02 PM

## 2023-01-12 ENCOUNTER — TELEPHONE (OUTPATIENT)
Dept: ORTHOPEDICS CLINIC | Facility: CLINIC | Age: 23
End: 2023-01-12

## 2023-01-14 DIAGNOSIS — L02.211 CUTANEOUS ABSCESS OF ABDOMINAL WALL: ICD-10-CM

## 2023-01-15 RX ORDER — IBUPROFEN 600 MG/1
TABLET ORAL
Qty: 40 TABLET | Refills: 0 | Status: SHIPPED | OUTPATIENT
Start: 2023-01-15

## 2023-01-15 NOTE — TELEPHONE ENCOUNTER
Refill passed per [address] [facility] [address] [facility] protocol. NSAID - Dr. Ketty Campbell, please review and refill if appropriate. Thank you. Fabian Kaur   Requested Prescriptions   Pending Prescriptions Disp Refills    IBUPROFEN 600 MG Oral Tab [Pharmacy Med Name: IBUPROFEN 600MG TABLETS] 40 tablet 0     Sig: TAKE 1 TABLET(600 MG) BY MOUTH EVERY 6 HOURS AS NEEDED FOR PAIN       Non-Narcotic Pain Medication Protocol Passed - 1/14/2023  6:19 PM        Passed - In person appointment or virtual visit in the past 6 mos or appointment in next 3 mos     Recent Outpatient Visits              1 month ago Tear of talofibular ligament of right lower extremity, subsequent encounter    [address] [facility], [address] Telles MD    Office Visit    1 month ago Acute cough    [address] [facility], [address], Helena Obregon MD    Telemedicine    1 month ago Tear of talofibular ligament of right lower extremity, subsequent encounter    [address] [facility], [address] [place] [address]    Office Visit    2 months ago Tear of talofibular ligament of right lower extremity, subsequent encounter    [facility], [address], [facility], Jenny Glass MD    Office Visit    3 months ago Chronic pain of right ankle    [address] [facility], [address] Telles MD    Office Visit          Future Appointments         Provider Department Appt Notes    In 1 week [facility], [address], [place], Orthopedics 1st POV Right ankle arthroscopy sx on 1/9/23    In 2 months Gladis Barrera, [address] [facility], [address] [facility]  [address] [place] [address]                     Recent Outpatient Visits              1 month ago Tear of talofibular ligament of right lower extremity, subsequent encounter    Weston Stokes MD    Office Visit    1 month ago Acute cough    Weston Stokes MD    Telemedicine    1 month ago Tear of talofibular ligament of right lower extremity, subsequent encounter    [facility], [address] [facility], [facility],     Office Visit    2 months ago Tear of talofibular ligament of right lower extremity, subsequent encounter    [facility], [address] [facility],[address], Dm Bush MD    Office Visit    3 months ago Chronic pain of right ankle    Papo Jeffers MD    Office Visit            Future Appointments         Provider Department Appt Notes    In 1 week Aleksey Jenkins, [address], [place], Orthopedics 1st POV Right ankle arthroscopy sx on 1/9/23    In 2 months Reather Riedel, [address] [facility], [facility]  [place] [address]

## 2023-01-18 NOTE — TELEPHONE ENCOUNTER
Dr. Leslie Thomas,     Please sign off on form: Fmla 1/9/23-6 wks   -Highlight the patient and hit \"Chart\" button. -In Chart Review, w/in the Encounter tab - click 1 time on the Telephone call encounter for 1/12/23 Scroll down the telephone encounter.  -Click \"scan on\" blue Hyperlink under \"Media\" heading for la Dr Leslie Thomas 1/18/23 w/in the telephone enc.  -Click on Acknowledge button at the bottom right corner and left-click onto image, signature stamp appears and drag signature to Provider signature line. Stamp will turn blue. Close window. Thank you,    Gemma Jay.

## 2023-01-20 NOTE — TELEPHONE ENCOUNTER
Forms completed and faxed to [facility] at [phone]. [employer].

## 2023-01-25 ENCOUNTER — OFFICE VISIT (OUTPATIENT)
Dept: ORTHOPEDICS CLINIC | Facility: CLINIC | Age: 23
End: 2023-01-25

## 2023-01-25 ENCOUNTER — OFFICE VISIT (OUTPATIENT)
Dept: FAMILY MEDICINE CLINIC | Facility: CLINIC | Age: 23
End: 2023-01-25

## 2023-01-25 VITALS — DIASTOLIC BLOOD PRESSURE: 80 MMHG | HEART RATE: 99 BPM | SYSTOLIC BLOOD PRESSURE: 139 MMHG

## 2023-01-25 DIAGNOSIS — M65.9 SYNOVITIS OF RIGHT ANKLE: ICD-10-CM

## 2023-01-25 DIAGNOSIS — L02.411 CUTANEOUS ABSCESS OF RIGHT AXILLA: Primary | ICD-10-CM

## 2023-01-25 DIAGNOSIS — M84.374A STRESS FRACTURE OF NAVICULAR BONE OF RIGHT FOOT: ICD-10-CM

## 2023-01-25 DIAGNOSIS — S93.491D TEAR OF TALOFIBULAR LIGAMENT OF RIGHT LOWER EXTREMITY, SUBSEQUENT ENCOUNTER: Primary | ICD-10-CM

## 2023-01-25 DIAGNOSIS — Z47.89 ORTHOPEDIC AFTERCARE: ICD-10-CM

## 2023-01-25 PROCEDURE — 99024 POSTOP FOLLOW-UP VISIT: CPT | Performed by: ORTHOPAEDIC SURGERY

## 2023-01-25 PROCEDURE — 99213 OFFICE O/P EST LOW 20 MIN: CPT | Performed by: NURSE PRACTITIONER

## 2023-01-25 PROCEDURE — 3075F SYST BP GE 130 - 139MM HG: CPT | Performed by: NURSE PRACTITIONER

## 2023-01-25 PROCEDURE — 3079F DIAST BP 80-89 MM HG: CPT | Performed by: NURSE PRACTITIONER

## 2023-01-25 RX ORDER — SULFAMETHOXAZOLE AND TRIMETHOPRIM 800; 160 MG/1; MG/1
1 TABLET ORAL 2 TIMES DAILY
Qty: 14 TABLET | Refills: 0 | Status: SHIPPED | OUTPATIENT
Start: 2023-01-25 | End: 2023-02-01

## 2023-02-08 ENCOUNTER — TELEPHONE (OUTPATIENT)
Dept: ORTHOPEDICS CLINIC | Facility: CLINIC | Age: 23
End: 2023-02-08

## 2023-02-08 NOTE — TELEPHONE ENCOUNTER
Patient requesting call back. Has questions on MRI disk and would like to see if she can get a copy.  Please advise

## 2023-02-09 ENCOUNTER — HOSPITAL ENCOUNTER (OUTPATIENT)
Dept: GENERAL RADIOLOGY | Age: 23
Discharge: HOME | End: 2023-02-09
Attending: ORTHOPAEDIC SURGERY
Payer: MEDICAID

## 2023-02-09 DIAGNOSIS — Z47.89 ORTHOPEDIC AFTERCARE: ICD-10-CM

## 2023-02-09 DIAGNOSIS — M84.374A STRESS FRACTURE OF NAVICULAR BONE OF RIGHT FOOT: ICD-10-CM

## 2023-02-09 PROCEDURE — 73630 X-RAY EXAM OF FOOT: CPT | Performed by: ORTHOPAEDIC SURGERY

## 2023-02-09 NOTE — TELEPHONE ENCOUNTER
Called pt back and relayed information below. Pt verbalized understanding. To request a copy of your medical records, please call the Medical Records Department, [phone] for further assistance. You can also view additional information about the process for obtaining medical records by visiting     Medical records  Ania Nichols ([address] [place] [id number]. org)

## 2023-02-16 ENCOUNTER — TELEPHONE (OUTPATIENT)
Dept: ORTHOPEDICS CLINIC | Facility: CLINIC | Age: 23
End: 2023-02-16

## 2023-02-16 NOTE — TELEPHONE ENCOUNTER
Pt requesting the note stating pt to off of work until next appointment on 3-8-23. Fax note to [phone]. Also put no pt's mychart.

## 2023-02-16 NOTE — TELEPHONE ENCOUNTER
Patient requesting note to return to work to indicate she is to return on April 3rd, stating she still needs to be off work. Patient indicates her employment at [employer] are faxing something over to the office. Please send note to patients mychart and also fax note to her employment at [employer]. Patient does not have fax number, but indicates it should be on the fax sent from them. Any questions please call at [phone],[facility].

## 2023-02-16 NOTE — TELEPHONE ENCOUNTER
Patient states her employer was asking when she would return to work states she has a follow up scheduled 3/8/23 with Dr. Claudell Ache. Told patient I would be happy to transfer her to forms department as they may be able to better assist her with her leave questions. Patient thanked Rylee Vargas and stated that she had no further questions at this time. Advised patient to call the office back with any new concerns.

## 2023-02-17 NOTE — TELEPHONE ENCOUNTER
Pt has a letter from 1/25/23 that states she should be off until 3/9. S/w pt and she states she was told this was faxed over on 2/16. Advised her if needed she should have a copy in her mychart to print off as well. She had no further concerns.

## 2023-03-08 ENCOUNTER — OFFICE VISIT (OUTPATIENT)
Dept: ORTHOPEDICS CLINIC | Facility: CLINIC | Age: 23
End: 2023-03-08

## 2023-03-08 ENCOUNTER — HOSPITAL ENCOUNTER (OUTPATIENT)
Dept: GENERAL RADIOLOGY | Age: 23
Discharge: HOME | End: 2023-03-08
Attending: ORTHOPAEDIC SURGERY
Payer: MEDICAID

## 2023-03-08 ENCOUNTER — TELEPHONE (OUTPATIENT)
Dept: PODIATRY CLINIC | Facility: CLINIC | Age: 23
End: 2023-03-08

## 2023-03-08 DIAGNOSIS — M65.9 SYNOVITIS OF RIGHT ANKLE: Primary | ICD-10-CM

## 2023-03-08 DIAGNOSIS — S93.491D TEAR OF TALOFIBULAR LIGAMENT OF RIGHT LOWER EXTREMITY, SUBSEQUENT ENCOUNTER: ICD-10-CM

## 2023-03-08 DIAGNOSIS — Z47.89 ORTHOPEDIC AFTERCARE: Primary | ICD-10-CM

## 2023-03-08 DIAGNOSIS — M84.374A STRESS FRACTURE OF NAVICULAR BONE OF RIGHT FOOT: ICD-10-CM

## 2023-03-08 DIAGNOSIS — Z47.89 ORTHOPEDIC AFTERCARE: ICD-10-CM

## 2023-03-08 PROCEDURE — 73630 X-RAY EXAM OF FOOT: CPT | Performed by: ORTHOPAEDIC SURGERY

## 2023-03-08 RX ORDER — IBUPROFEN 600 MG/1
600 TABLET ORAL EVERY 6 HOURS PRN
Qty: 90 TABLET | Refills: 0 | Status: SHIPPED | OUTPATIENT
Start: 2023-03-08

## 2023-03-08 NOTE — TELEPHONE ENCOUNTER
Per Dr Pringle Carrier request order for x-ray was entered in the system. Patient notified to come in earlier to get her x-rays before the michael time today.  Kamla Leonardo

## 2023-03-13 ENCOUNTER — APPOINTMENT (OUTPATIENT)
Dept: PHYSICAL THERAPY | Age: 23
End: 2023-03-13
Attending: ORTHOPAEDIC SURGERY
Payer: MEDICAID

## 2023-03-15 ENCOUNTER — OFFICE VISIT (OUTPATIENT)
Dept: PHYSICAL THERAPY | Age: 23
End: 2023-03-15
Attending: ORTHOPAEDIC SURGERY
Payer: MEDICAID

## 2023-03-15 DIAGNOSIS — Z47.89 ORTHOPEDIC AFTERCARE: ICD-10-CM

## 2023-03-15 PROCEDURE — 97161 PT EVAL LOW COMPLEX 20 MIN: CPT

## 2023-03-15 PROCEDURE — 97110 THERAPEUTIC EXERCISES: CPT

## 2023-03-17 ENCOUNTER — OFFICE VISIT (OUTPATIENT)
Dept: PHYSICAL THERAPY | Age: 23
End: 2023-03-17
Attending: FAMILY MEDICINE
Payer: MEDICAID

## 2023-03-17 PROCEDURE — 97140 MANUAL THERAPY 1/> REGIONS: CPT

## 2023-03-17 PROCEDURE — 97110 THERAPEUTIC EXERCISES: CPT

## 2023-03-17 NOTE — PROGRESS NOTES
Diagnosis:  S/p right lateral ligament reconstruction      Next MD visit: none scheduled  Fall Risk: standard         Precautions: n/a    Date of Surgery: 1/9/2023       Medication Changes since last visit?: No    Subjective: Patient reports 6/10 ankle pain today. Objective:     Date: 3/17/2023  Visit #: 2/9 ([facility])  exp. 5/14/2023   HEP   -   Therapeutic Exercise   - seated R ankle DF 2 x 10  - seated R ankle PF 2 x 10  - seated R ankle inversion & eversion 1 x 10  - supine R ankle AROM DF/PF/INV/EV 1 x 10 5 sec holds  - supine R ankle isometrics into INV/EV with clinician 5 sec holds 1 x 10 ea  - supine R ankle DF/PF with RTB 2 x 10 ea  - supine R ankle PROM into all directions x 7 minutes   Manual Therapy   - STM to R ankle for edema management x 23 minutes   Therapeutic Activity      Modalities              Assessment: Session focused on improving ankle mobility and reducing edema.      Plan: continue PT    Charges:  Ex 1 Man 2     Total Timed Treatment: 45 min  Total Treatment Time: 45 min

## 2023-03-20 ENCOUNTER — OFFICE VISIT (OUTPATIENT)
Dept: PHYSICAL THERAPY | Age: 23
End: 2023-03-20
Attending: ORTHOPAEDIC SURGERY
Payer: MEDICAID

## 2023-03-20 PROCEDURE — 97140 MANUAL THERAPY 1/> REGIONS: CPT

## 2023-03-20 PROCEDURE — 97110 THERAPEUTIC EXERCISES: CPT

## 2023-03-20 NOTE — PROGRESS NOTES
Diagnosis:  S/p right lateral ligament reconstruction      Next MD visit: none scheduled  Fall Risk: standard         Precautions: n/a    Date of Surgery: 1/9/2023       Medication Changes since last visit?: No    Subjective: Patient reports 6/10 ankle pain today. Objective:     Date: 3/20/2023  Visit #: 3/9 [facility])  exp. 5/14/2023 Date: 3/17/2023  Visit #: 2/9 ([facility])  exp. 5/14/2023   HEP    -   Therapeutic Exercise   - supine R ankle PROM into all directions x 8 minutes  - supine R ankle DF/PF AROM 20x ea  - supine R ankle DF/PF with RTB 2 x 10 ea  - seated R ankle active INV/EV 10x ea - seated R ankle DF 2 x 10  - seated R ankle PF 2 x 10  - seated R ankle inversion & eversion 1 x 10  - supine R ankle AROM DF/PF/INV/EV 1 x 10 5 sec holds  - supine R ankle isometrics into INV/EV with clinician 5 sec holds 1 x 10 ea  - supine R ankle DF/PF with RTB 2 x 10 ea  - supine R ankle PROM into all directions x 7 minutes   Manual Therapy   - STM to R ankle for edema management x 10 minutes - STM to R ankle for edema management x 23 minutes   Therapeutic Activity       Modalities                Assessment: Continued with passive ankle stretching and progressed reps of ankle theraband strengthening exercises today.     Plan: continue PT    Charges:  Ex 1 Man 2     Total Timed Treatment: 45 min  Total Treatment Time: 45 min

## 2023-03-22 ENCOUNTER — OFFICE VISIT (OUTPATIENT)
Dept: PHYSICAL THERAPY | Age: 23
End: 2023-03-22
Attending: ORTHOPAEDIC SURGERY
Payer: MEDICAID

## 2023-03-22 PROCEDURE — 97110 THERAPEUTIC EXERCISES: CPT

## 2023-03-22 NOTE — PROGRESS NOTES
Diagnosis:  S/p right lateral ligament reconstruction      Next MD visit: none scheduled  Fall Risk: standard         Precautions: n/a    Date of Surgery: 1/9/2023       Medication Changes since last visit?: No    Subjective: Patient reports 4/10 ankle pain today. Objective:    3/22/2023:  Pt ambulating independently (no CAM boot)     Date: 3/22/2023  Visit #: 4/9 [facility])  exp. 5/14/2023 Date: 3/20/2023  Visit #: 3/9 ([facility])  exp. 5/14/2023 Date: 3/17/2023  Visit #: 2/9 ([facility])  exp. 5/14/2023   HEP   - updated HEP - see pt instructions section; RTB & GTB provided for home use  -   Therapeutic Exercise   - NuStep level 5 (UEs and LEs) x 5 minutes  - seated R rockerboard AP taps 2 x 10  - seated R rockerboard ML taps 2 x 10  - seated R toe/towel scrunches x 1 minute  - seated R ankle DF AROM 3 x 10  - seated R ankle PF AROM 3 x 10  - seated R ankle circles CW/CCW 15x ea  - supine R ankle PROM in all directions x 8 minutes  - supine R ankle DF/PF/INV/EV with RTB 3 x 10 ea   - supine R ankle PROM into all directions x 8 minutes  - supine R ankle DF/PF AROM 20x ea  - supine R ankle DF/PF with RTB 2 x 10 ea  - seated R ankle active INV/EV 10x ea - seated R ankle DF 2 x 10  - seated R ankle PF 2 x 10  - seated R ankle inversion & eversion 1 x 10  - supine R ankle AROM DF/PF/INV/EV 1 x 10 5 sec holds  - supine R ankle isometrics into INV/EV with clinician 5 sec holds 1 x 10 ea  - supine R ankle DF/PF with RTB 2 x 10 ea  - supine R ankle PROM into all directions x 7 minutes   Manual Therapy    - STM to R ankle for edema management x 10 minutes - STM to R ankle for edema management x 23 minutes   Therapeutic Activity   - discussed elevation for edema management     Modalities                  Assessment: Initiated NuStep machine to promote global LE strength and advanced ankle theraband strengthening interventions. Updated HEP.      Plan: continue PT    Charges:  Ex 4     Total Timed Treatment: 54 min  Total Treatment Time: 54 min hard copy, drawn during this pregnancy

## 2023-03-28 ENCOUNTER — OFFICE VISIT (OUTPATIENT)
Dept: PHYSICAL THERAPY | Age: 23
End: 2023-03-28
Attending: ORTHOPAEDIC SURGERY
Payer: MEDICAID

## 2023-03-28 PROCEDURE — 97110 THERAPEUTIC EXERCISES: CPT

## 2023-03-28 NOTE — PROGRESS NOTES
Diagnosis:  S/p right lateral ligament reconstruction      Next MD visit: none scheduled  Fall Risk: standard         Precautions: n/a    Date of Surgery: 1/9/2023       Medication Changes since last visit?: No    Subjective: Patient reports 3/10 ankle pain today. She reports her ankle was more swollen lately so she put her CAM boot back on and was trying to elevate her leg today.      Objective:    3/22/2023:  Pt ambulating independently (no CAM boot)    3/28/2023:  Pt ambulating today with CAM boot  Increased global R ankle edema noted today     Date: 3/28/2023  Visit #: 5/9 [facility])  exp. 5/14/2023 Date: 3/22/2023  Visit #: 4/9 ([facility])  exp. 5/14/2023 Date: 3/20/2023  Visit #: 3/9 ([facility])  exp. 5/14/2023   HEP    - updated HEP - see pt instructions section; RTB & GTB provided for home use    Therapeutic Exercise   - seated R rockerboard AP taps 2 x 20  - seated R rockerboard ML taps 2 x 20  - seated R toe/towel scrunches x 1 minute x 2 reps  - supine R ankle PROM in all directions x 8 minutes  - supine R ankle DF/PF/INV/EV with GTB 2 x 10 ea  - supine R ankle INV/EV 1 x 10 ea  - sidelying R hip abduction 2 x 10  - seated active R ankle INV/EV 2 x 10 ea - NuStep level 5 (UEs and LEs) x 5 minutes  - seated R rockerboard AP taps 2 x 10  - seated R rockerboard ML taps 2 x 10  - seated R toe/towel scrunches x 1 minute  - seated R ankle DF AROM 3 x 10  - seated R ankle PF AROM 3 x 10  - seated R ankle circles CW/CCW 15x ea  - supine R ankle PROM in all directions x 8 minutes  - supine R ankle DF/PF/INV/EV with RTB 3 x 10 ea   - supine R ankle PROM into all directions x 8 minutes  - supine R ankle DF/PF AROM 20x ea  - supine R ankle DF/PF with RTB 2 x 10 ea  - seated R ankle active INV/EV 10x ea   Manual Therapy     - STM to R ankle for edema management x 10 minutes   Therapeutic Activity    - discussed elevation for edema management    Modalities                  Assessment: Patient displaying improved ankle dorsiflexion and eversion ROM despite increased in edema today, however, still with tightness most significantly into inversion and plantarflexion.        Plan: continue PT    Charges:  Ex 3     Total Timed Treatment:  45 min  Total Treatment Time: 45 min

## 2023-03-30 ENCOUNTER — OFFICE VISIT (OUTPATIENT)
Dept: PHYSICAL THERAPY | Age: 23
End: 2023-03-30
Attending: ORTHOPAEDIC SURGERY
Payer: MEDICAID

## 2023-03-30 PROCEDURE — 97110 THERAPEUTIC EXERCISES: CPT

## 2023-03-30 NOTE — PROGRESS NOTES
Diagnosis:  S/p right lateral ligament reconstruction      Next MD visit: none scheduled  Fall Risk: standard         Precautions: n/a    Date of Surgery: 1/9/2023       Medication Changes since last visit?: No    Subjective: Patient reports 2/10 ankle pain today. She reports her ankle is doing better today and is less swollen.       Objective:    3/30/2023:  Pt ambulating independently (no CAM boot)       Date: 3/30/2023  Visit #: 6/9 ([facility])  exp. 5/14/2023 Date: 3/28/2023  Visit #: 5/9 ([facility])  exp. 5/14/2023 Date: 3/22/2023  Visit #: 4/9 ([facility])  exp. 5/14/2023   HEP     - updated HEP - see pt instructions section; RTB & GTB provided for home use   Therapeutic Exercise   - NuStep level 5 (LEs only) x 5 minutes  - standing on rocker board AP taps 20x  - standing R/L hip abduction/hip extension 2.5# 2 x 10 ea  - shuttle machine B knee ext/flx 5 bands 2 x 10  - supine R ankle PROM in all directions x 10 minutes  - supine R active ankle inversion/eversion 20x ea  - supine R ankle DF/PF/INV/EV with GTB 3 x 10 ea  - sidelying R ankle inversion AROM 2 x 10   - seated R rockerboard AP taps 2 x 20  - seated R rockerboard ML taps 2 x 20  - seated R toe/towel scrunches x 1 minute x 2 reps  - supine R ankle PROM in all directions x 8 minutes  - supine R ankle DF/PF/INV/EV with GTB 2 x 10 ea  - supine R ankle INV/EV 1 x 10 ea  - sidelying R hip abduction 2 x 10  - seated active R ankle INV/EV 2 x 10 ea - NuStep level 5 (UEs and LEs) x 5 minutes  - seated R rockerboard AP taps 2 x 10  - seated R rockerboard ML taps 2 x 10  - seated R toe/towel scrunches x 1 minute  - seated R ankle DF AROM 3 x 10  - seated R ankle PF AROM 3 x 10  - seated R ankle circles CW/CCW 15x ea  - supine R ankle PROM in all directions x 8 minutes  - supine R ankle DF/PF/INV/EV with RTB 3 x 10 ea     Manual Therapy        Therapeutic Activity     - discussed elevation for edema management   Modalities                  Assessment: Patient displaying improved ankle mobility noted during session as well as reduced global edema. Initiated shuttle machine and standing hip strengthening interventions. Continued to progress ankle AROM and strengthening exercises.      Plan: continue PT    Charges:  Ex 4     Total Timed Treatment: 54 min  Total Treatment Time: 54 min

## 2023-04-03 ENCOUNTER — TELEPHONE (OUTPATIENT)
Dept: PHYSICAL THERAPY | Age: 23
End: 2023-04-03

## 2023-04-03 ENCOUNTER — TELEPHONE (OUTPATIENT)
Dept: ORTHOPEDICS CLINIC | Facility: CLINIC | Age: 23
End: 2023-04-03

## 2023-04-03 NOTE — TELEPHONE ENCOUNTER
Right ankle sx 1/9/23. Per note below patient has to reschedule appt 4/5/23 due to Covid-19 concern. Please advise if patient can wait to see you until 4/26/23 and if so can patient be added on? Thanks!

## 2023-04-03 NOTE — TELEPHONE ENCOUNTER
Patient requesting sooner appointment. Was scheduled for 04/05 and had to cancel due to positive Covid test. Needs sooner appointment than end of May.  Please advise

## 2023-04-03 NOTE — TELEPHONE ENCOUNTER
Called patient to offer appointment for 4/5/2023, however, patient stating she found out she has COVID-19 so she will not be able to attend. Confirmed patient's next scheduled appointment for 4/14/2023.

## 2023-04-05 NOTE — TELEPHONE ENCOUNTER
Spoke with patient appt rescheduled for 4/26/23 at 1:15pm with Dr. Corina Villegas per patient can not come much later has PT at 3:15pm.

## 2023-04-14 ENCOUNTER — APPOINTMENT (OUTPATIENT)
Dept: PHYSICAL THERAPY | Age: 23
End: 2023-04-14
Attending: FAMILY MEDICINE
Payer: MEDICAID

## 2023-04-18 ENCOUNTER — HOSPITAL ENCOUNTER (OUTPATIENT)
Age: 23
Discharge: HOME | End: 2023-04-18
Payer: MEDICAID

## 2023-04-18 VITALS
HEART RATE: 98 BPM | SYSTOLIC BLOOD PRESSURE: 134 MMHG | OXYGEN SATURATION: 99 % | RESPIRATION RATE: 18 BRPM | TEMPERATURE: 99 F | DIASTOLIC BLOOD PRESSURE: 74 MMHG

## 2023-04-18 DIAGNOSIS — H92.01 RIGHT EAR PAIN: Primary | ICD-10-CM

## 2023-04-18 DIAGNOSIS — H65.91 FLUID LEVEL BEHIND TYMPANIC MEMBRANE OF RIGHT EAR: ICD-10-CM

## 2023-04-18 DIAGNOSIS — J02.8 SORE THROAT (VIRAL): ICD-10-CM

## 2023-04-18 DIAGNOSIS — B97.89 SORE THROAT (VIRAL): ICD-10-CM

## 2023-04-18 LAB — S PYO AG THROAT QL: NEGATIVE

## 2023-04-18 PROCEDURE — 99213 OFFICE O/P EST LOW 20 MIN: CPT | Performed by: NURSE PRACTITIONER

## 2023-04-18 PROCEDURE — 87880 STREP A ASSAY W/OPTIC: CPT | Performed by: NURSE PRACTITIONER

## 2023-04-18 RX ORDER — IBUPROFEN 600 MG/1
600 TABLET ORAL EVERY 8 HOURS PRN
Qty: 12 TABLET | Refills: 0 | Status: SHIPPED | OUTPATIENT
Start: 2023-04-18

## 2023-04-18 NOTE — DISCHARGE INSTRUCTIONS
Take ibuprofen as prescribed for pain you may try over-the-counter Chloraseptic spray for throat discomfort or throat lozenges. Recommend over-the-counter Sudafed for 2 to 3 days to help with the fluid behind the ear then discontinue Sudafed and start Flonase and 24-hour Zyrtec or Claritin to help with the fluid behind the ear. Follow-up with your primary care provider if symptoms persist or worsen. If you develop severe headache worsening ear pain outer ear pain swelling redness or warmth fevers or chills neck stiffness nausea or vomiting go to the nearest emergency department.

## 2023-04-19 ENCOUNTER — OFFICE VISIT (OUTPATIENT)
Dept: PHYSICAL THERAPY | Age: 23
End: 2023-04-19
Attending: FAMILY MEDICINE
Payer: MEDICAID

## 2023-04-19 PROCEDURE — 97110 THERAPEUTIC EXERCISES: CPT

## 2023-04-19 NOTE — PROGRESS NOTES
Diagnosis:  S/p right lateral ligament reconstruction      Next MD visit: none scheduled  Fall Risk: standard         Precautions: n/a    Date of Surgery: 1/9/2023       Medication Changes since last visit?: No    Subjective: Patient reports 7/10 ankle pain today. She reports her ankle was doing good for the past few days, but yesterday it had gotten more swollen and painful specifically in the medial ankle region.         Objective:    3/30/2023:  Pt ambulating independently (no CAM boot)     Date: 4/19/2023  Visit #: 7/9 ([facility])  exp. 5/14/2023 Date: 3/30/2023  Visit #: 6/9 ([facility])  exp. 5/14/2023 Date: 3/28/2023  Visit #: 5/9 ([facility])  exp. 5/14/2023   HEP        Therapeutic Exercise   - NuStep level 5 (UEs & LEs) x 6 minutes  - standing on rocker board AP taps 20x  - standing on slant board level 4 3 x 30 sec holds  - standing R/L hip abduction/hip extension with GTB at ankles 2 x 10 ea  - shuttle machine B knee ext/flx 5 bands - not today  - supine R ankle PROM in all directions x 8 minutes  - supine R active ankle inversion/eversion 2 x 10 ea  - supine R active ankle DF/PF 20x  - supine R ankle DF/PF/INV/EV with GTB 3 x 10 ea  - supine R SLR 2 x 10 - NuStep level 5 (LEs only) x 5 minutes  - standing on rocker board AP taps 20x  - standing R/L hip abduction/hip extension 2.5# 2 x 10 ea  - shuttle machine B knee ext/flx 5 bands 2 x 10  - supine R ankle PROM in all directions x 10 minutes  - supine R active ankle inversion/eversion 20x ea  - supine R ankle DF/PF/INV/EV with GTB 3 x 10 ea  - sidelying R ankle inversion AROM 2 x 10   - seated R rockerboard AP taps 2 x 20  - seated R rockerboard ML taps 2 x 20  - seated R toe/towel scrunches x 1 minute x 2 reps  - supine R ankle PROM in all directions x 8 minutes  - supine R ankle DF/PF/INV/EV with GTB 2 x 10 ea  - supine R ankle INV/EV 1 x 10 ea  - sidelying R hip abduction 2 x 10  - seated active R ankle INV/EV 2 x 10 ea   Manual Therapy Therapeutic Activity        Modalities                  Assessment: Patient with increase in right medial region ankle pain today. Progressed global RLE strengthening interventions and discussed edema management strategies including elevation at home along with importance of regular performance of ankle strengthening exercises.     Plan: continue PT    Charges:  Ex 3     Total Timed Treatment: 50 min  Total Treatment Time: 50 min

## 2023-04-21 ENCOUNTER — NURSE TRIAGE (OUTPATIENT)
Dept: FAMILY MEDICINE CLINIC | Facility: CLINIC | Age: 23
End: 2023-04-21

## 2023-04-21 NOTE — TELEPHONE ENCOUNTER
Spoke with the patient,verified full name and     Informed her of message and instructions below. Will call back if any further assistance is needed.

## 2023-04-21 NOTE — TELEPHONE ENCOUNTER
Unfortunately I have no openings left for the day. I agree with triage recommendations for UC eval today to see if antibiotics are recommended.

## 2023-04-24 ENCOUNTER — OFFICE VISIT (OUTPATIENT)
Dept: PHYSICAL THERAPY | Age: 23
End: 2023-04-24
Attending: ORTHOPAEDIC SURGERY
Payer: MEDICAID

## 2023-04-24 PROCEDURE — 97110 THERAPEUTIC EXERCISES: CPT

## 2023-04-24 NOTE — PROGRESS NOTES
Diagnosis:  S/p right lateral ligament reconstruction      Next MD visit: none scheduled  Fall Risk: standard         Precautions: n/a    Date of Surgery: 1/9/2023       Medication Changes since last visit?: No    Subjective: Patient reports generalized decreased R ankle swelling, but continued more edema in the medial ankle region. She reports 2/10 ankle pain today.      Objective:    3/30/2023:  Pt ambulating independently (no CAM boot)     Date: 4/24/2023  Visit #: 8/9 ([facility])  exp. 5/14/2023 Date: 4/19/2023  Visit #: 7/9 ([facility])  exp. 5/14/2023 Date: 3/30/2023  Visit #: 6/9 ([facility])  exp. 5/14/2023   HEP        Therapeutic Exercise   - NuStep level 5 (UEs & LEs) x 7 minutes  - standing B heel raises - next session  - standing B gastroc stretch on slant board level 4 3 x 30 sec holds  - shuttle machine B knee ext/flx 5 bands 2 x 10  - shuttle machine R knee ext/flx 3 bands 2 x 10  - supine R ankle PROM in all directions x 8 minutes  - supine R active ankle inversion/eversion 2 x 10 ea  - supine R active ankle DF/PF 20x ea  - supine R ankle DF/PF/INV/EV with GTB 3 x 10 ea  - supine R SLR 2 x 10  - sidelying R ankle inversion AROM 2 x 10 - NuStep level 5 (UEs & LEs) x 6 minutes  - standing on rocker board AP taps 20x  - standing B gastroc stretch on slant board level 4 3 x 30 sec holds  - standing R/L hip abduction/hip extension with GTB at ankles 2 x 10 ea  - shuttle machine B knee ext/flx 5 bands - not today  - supine R ankle PROM in all directions x 8 minutes  - supine R active ankle inversion/eversion 2 x 10 ea  - supine R active ankle DF/PF 20x  - supine R ankle DF/PF/INV/EV with GTB 3 x 10 ea  - supine R SLR 2 x 10 - NuStep level 5 (LEs only) x 5 minutes  - standing on rocker board AP taps 20x  - standing R/L hip abduction/hip extension 2.5# 2 x 10 ea  - shuttle machine B knee ext/flx 5 bands 2 x 10  - supine R ankle PROM in all directions x 10 minutes  - supine R active ankle inversion/eversion 20x ea  - supine R ankle DF/PF/INV/EV with GTB 3 x 10 ea  - sidelying R ankle inversion AROM 2 x 10     Manual Therapy        Therapeutic Activity        Modalities                  Assessment: Patient displaying improved global R ankle strength noted today during performance of interventions. Initiated single leg strengthening on leg press this session.      Plan: continue PT    Charges:  Ex 3     Total Timed Treatment: 42 min  Total Treatment Time: 42 min

## 2023-04-25 ENCOUNTER — HOSPITAL ENCOUNTER (OUTPATIENT)
Age: 23
Discharge: HOME | End: 2023-04-25
Payer: MEDICAID

## 2023-04-25 VITALS
DIASTOLIC BLOOD PRESSURE: 48 MMHG | HEART RATE: 81 BPM | OXYGEN SATURATION: 99 % | RESPIRATION RATE: 18 BRPM | TEMPERATURE: 97 F | SYSTOLIC BLOOD PRESSURE: 127 MMHG

## 2023-04-25 DIAGNOSIS — L03.211 FACIAL CELLULITIS: Primary | ICD-10-CM

## 2023-04-25 DIAGNOSIS — H10.33 ACUTE CONJUNCTIVITIS OF BOTH EYES, UNSPECIFIED ACUTE CONJUNCTIVITIS TYPE: ICD-10-CM

## 2023-04-25 PROCEDURE — 99213 OFFICE O/P EST LOW 20 MIN: CPT | Performed by: NURSE PRACTITIONER

## 2023-04-25 RX ORDER — ERYTHROMYCIN 5 MG/G
1 OINTMENT OPHTHALMIC EVERY 6 HOURS
Qty: 3.5 G | Refills: 0 | Status: SHIPPED | OUTPATIENT
Start: 2023-04-25 | End: 2023-05-02

## 2023-04-25 RX ORDER — CEPHALEXIN 500 MG/1
500 CAPSULE ORAL 2 TIMES DAILY
Qty: 14 CAPSULE | Refills: 0 | Status: SHIPPED | OUTPATIENT
Start: 2023-04-25 | End: 2023-05-02

## 2023-04-26 ENCOUNTER — APPOINTMENT (OUTPATIENT)
Dept: PHYSICAL THERAPY | Age: 23
End: 2023-04-26
Attending: ORTHOPAEDIC SURGERY
Payer: MEDICAID

## 2023-04-26 ENCOUNTER — HOSPITAL ENCOUNTER (OUTPATIENT)
Dept: GENERAL RADIOLOGY | Age: 23
Discharge: HOME | End: 2023-04-26
Attending: ORTHOPAEDIC SURGERY
Payer: OTHER MISCELLANEOUS

## 2023-04-26 ENCOUNTER — OFFICE VISIT (OUTPATIENT)
Dept: ORTHOPEDICS CLINIC | Facility: CLINIC | Age: 23
End: 2023-04-26

## 2023-04-26 DIAGNOSIS — M65.9 SYNOVITIS OF RIGHT ANKLE: Primary | ICD-10-CM

## 2023-04-26 DIAGNOSIS — E66.01 MORBID OBESITY WITH BMI OF 60.0-69.9, ADULT (HCC): ICD-10-CM

## 2023-04-26 DIAGNOSIS — S93.491D TEAR OF TALOFIBULAR LIGAMENT OF RIGHT LOWER EXTREMITY, SUBSEQUENT ENCOUNTER: ICD-10-CM

## 2023-04-26 DIAGNOSIS — M84.374A STRESS FRACTURE OF NAVICULAR BONE OF RIGHT FOOT: ICD-10-CM

## 2023-04-26 PROCEDURE — 99214 OFFICE O/P EST MOD 30 MIN: CPT | Performed by: ORTHOPAEDIC SURGERY

## 2023-04-26 PROCEDURE — 73630 X-RAY EXAM OF FOOT: CPT | Performed by: ORTHOPAEDIC SURGERY

## 2023-04-26 NOTE — PROGRESS NOTES
Patient presents with: Ankle Pain: Patient is here to follow up on right ankle. Patient just got ankle brace today. Patient completed xrays prior to appointment. Rates pain 5/10, patient is doing PT with success. HPI  Pt states her ankle feels more stable. Physical Exam  Gen: NAD, alert, answering questions appropriately  HEENT: NCAT, EOMI  Lungs: NL breathing, symmetrical lung expansion  Abd: Soft, ND  Extremities:   R Ankle with neutral alignment, DF/PF/EHL intact, SILT, cap refill brisk, negative anterior drawer, incisions well healed. Improved swelling. Painless foot/ankle ROM    Imaging seen and reviewed by me:   3v of the right foot s/p brostrom reconstruction. Plantigrade foot present. Joint spaces well preserved. Increased sclerosis noted along navicular consistent with post-op changes. Assessment/Plan: 23year old year old female presenting 3 months s/p R ankle secondary Brostrom reconstruction and navicular CRPP on 1/9/23. 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 She may return to work but should sit for 10 minutes after walking about an hour. She will continue wearing the lace up brace, orthotics for her shoes and good supportive shoes. She will f/u in 2 months to assess her progress with foot xrays.     Allan Mora, DO  04/26/23

## 2023-05-01 ENCOUNTER — OFFICE VISIT (OUTPATIENT)
Dept: PHYSICAL THERAPY | Age: 23
End: 2023-05-01
Attending: ORTHOPAEDIC SURGERY
Payer: MEDICAID

## 2023-05-01 PROCEDURE — 97110 THERAPEUTIC EXERCISES: CPT

## 2023-05-01 PROCEDURE — 97140 MANUAL THERAPY 1/> REGIONS: CPT

## 2023-05-01 NOTE — PROGRESS NOTES
Diagnosis:  S/p right lateral ligament reconstruction      Next MD visit: none scheduled  Fall Risk: standard         Precautions: n/a    Date of Surgery: 1/9/2023       Medication Changes since last visit?: No    Subjective: Patient reports 0/10 right ankle pain today. She presents to clinic with ankle brace today. She reports her ankle is doing better.   She states she saw her surgeon and she started back at work, but with instructions/work restrictions for rest breaks from MD.    Objective:     Date: 5/1/2023  Visit #: 9/9 ([facility])  exp. 5/14/2023 Date: 4/24/2023  Visit #: 8/9 ([facility])  exp. 5/14/2023 Date: 4/19/2023  Visit #: 7/9 ([facility])  exp. 5/14/2023   HEP        Therapeutic Exercise   - supine R SLR 3 x 10  - sidelying R ankle inversion 3 x 10  - supine R ankle DF/PF 25x ea  - supine R ankle DF/PF/INV/EV with Blue Tband 2 x 10 ea  - standing R/L hip abduction/hip extension with GTB at ankles 2 x 10 ea  - standing B heel raises 2 x 10   - shuttle machine B knee ext/flx 5 bands 2 x 10  - shuttle machine R knee ext/flx 3 bands 3 x 10     - NuStep level 5 (UEs & LEs) x 7 minutes  - standing B heel raises - next session  - standing B gastroc stretch on slant board level 4 3 x 30 sec holds  - shuttle machine B knee ext/flx 5 bands 2 x 10  - shuttle machine R knee ext/flx 3 bands 2 x 10  - supine R ankle PROM in all directions x 8 minutes  - supine R active ankle inversion/eversion 2 x 10 ea  - supine R active ankle DF/PF 20x ea  - supine R ankle DF/PF/INV/EV with GTB 3 x 10 ea  - supine R SLR 2 x 10  - sidelying R ankle inversion AROM 2 x 10 - NuStep level 5 (UEs & LEs) x 6 minutes  - standing on rocker board AP taps 20x  - standing B gastroc stretch on slant board level 4 3 x 30 sec holds  - standing R/L hip abduction/hip extension with GTB at ankles 2 x 10 ea  - shuttle machine B knee ext/flx 5 bands - not today  - supine R ankle PROM in all directions x 8 minutes  - supine R active ankle inversion/eversion 2 x 10 ea  - supine R active ankle DF/PF 20x  - supine R ankle DF/PF/INV/EV with GTB 3 x 10 ea  - supine R SLR 2 x 10   Manual Therapy        Therapeutic Activity        Modalities        Neuromuscular re-ed - R/L tandem stance 2 x 20 sec holds ea  - R SLS 3 x 20 sec holds  - standing on rockerboard AP taps 2 x 20 reps        Assessment: Patient displaying improved gait pattern with reduced deviations and improved ankle strength noted during performance of exercises. Increased resistance of ankle strengthening interventions and initiated balance challenges.      Plan: continue PT    Charges:  Ex 2 NM 1   Total Timed Treatment: 45 min  Total Treatment Time: 45 min

## 2023-05-03 ENCOUNTER — OFFICE VISIT (OUTPATIENT)
Dept: PHYSICAL THERAPY | Age: 23
End: 2023-05-03
Attending: ORTHOPAEDIC SURGERY
Payer: MEDICAID

## 2023-05-03 PROCEDURE — 97110 THERAPEUTIC EXERCISES: CPT

## 2023-05-03 NOTE — PROGRESS NOTES
Diagnosis:  S/p right lateral ligament reconstruction      Next MD visit: none scheduled  Fall Risk: standard         Precautions: n/a    Date of Surgery: 1/9/2023       Medication Changes since last visit?: No    Subjective: Patient reports 0/10 right ankle pain today. Objective:     Date: 5/3/2023  Visit #: 10/24 [facility])  exp.  7/2/2023 Date: 5/1/2023  Visit #: 9/9 [facility])  exp. 5/14/2023 Date: 4/24/2023  Visit #: 8/9 ([facility])  exp. 5/14/2023   HEP        Therapeutic Exercise   - NuStep level 6 (UEs & LEs) x 7 minutes  - supine R SLR 3 x 10  - sidelying R ankle inversion 3 x 10  - supine R ankle DF/PF 25x ea  - supine R ankle DF/PF/INV/EV with Blue Tband 2 x 10 ea  - standing R/L hip abduction/hip extension with GTB at ankles 2 x 10 ea  - standing B heel raises 2 x 10   - shuttle machine B knee ext/flx 5 bands 2 x 10  - shuttle machine R knee ext/flx 3 bands 3 x 10 - NuStep level 5 (UEs & LEs) x 7 minutes  - supine R SLR 3 x 10  - sidelying R ankle inversion 3 x 10  - supine R ankle DF/PF 25x ea  - supine R ankle DF/PF/INV/EV with Blue Tband 2 x 10 ea  - standing R/L hip abduction/hip extension with GTB at ankles 2 x 10 ea  - standing B heel raises 2 x 10   - shuttle machine B knee ext/flx 5 bands 2 x 10  - shuttle machine R knee ext/flx 3 bands 3 x 10     - NuStep level 5 (UEs & LEs) x 7 minutes  - standing B heel raises - next session  - standing B gastroc stretch on slant board level 4 3 x 30 sec holds  - shuttle machine B knee ext/flx 5 bands 2 x 10  - shuttle machine R knee ext/flx 3 bands 2 x 10  - supine R ankle PROM in all directions x 8 minutes  - supine R active ankle inversion/eversion 2 x 10 ea  - supine R active ankle DF/PF 20x ea  - supine R ankle DF/PF/INV/EV with GTB 3 x 10 ea  - supine R SLR 2 x 10  - sidelying R ankle inversion AROM 2 x 10   Manual Therapy        Therapeutic Activity        Modalities        Neuromuscular re-ed  - R/L tandem stance 2 x 20 sec holds ea  - R SLS 3 x 20 sec holds  - standing on rockerboard AP taps 2 x 20 reps       Assessment:      Plan: continue PT    Charges:  Ex 2 NM 1   Total Timed Treatment: 45 min  Total Treatment Time: 45 min

## 2023-05-03 NOTE — PROGRESS NOTES
Diagnosis:  S/p right lateral ligament reconstruction      Next MD visit: none scheduled  Fall Risk: standard         Precautions: n/a    Date of Surgery: 1/9/2023       Medication Changes since last visit?: No    Subjective: Patient reports 0/10 right ankle pain. Objective:     Date: 5/3/2023  Visit #: 10/24 [facility])  exp.  7/2/2023 Date: 5/1/2023  Visit #: 9/9 [facility])  exp. 5/14/2023 Date: 4/24/2023  Visit #: 8/9 [facility])  exp. 5/14/2023   HEP        Therapeutic Exercise   - NuStep level 6 (UEs & LEs) x 6 minutes  - supine R ankle DF/PF/INV/EV with Blue Tband 2 x 10 ea  - standing R forward step ups to 4 inch step 1 x 10  - standing R lateral step ups to 4 inch step 1 x 10  - standing R gastroc stretch at wall 3 x 30 sec holds - NuStep level 5 (UEs & LEs) x 7 minutes  - supine R SLR 3 x 10  - sidelying R ankle inversion 3 x 10  - supine R ankle DF/PF 25x ea  - supine R ankle DF/PF/INV/EV with Blue Tband 2 x 10 ea  - standing R/L hip abduction/hip extension with GTB at ankles 2 x 10 ea  - standing B heel raises 2 x 10   - shuttle machine B knee ext/flx 5 bands 2 x 10  - shuttle machine R knee ext/flx 3 bands 3 x 10     - NuStep level 5 (UEs & LEs) x 7 minutes  - standing B heel raises - next session  - standing B gastroc stretch on slant board level 4 3 x 30 sec holds  - shuttle machine B knee ext/flx 5 bands 2 x 10  - shuttle machine R knee ext/flx 3 bands 2 x 10  - supine R ankle PROM in all directions x 8 minutes  - supine R active ankle inversion/eversion 2 x 10 ea  - supine R active ankle DF/PF 20x ea  - supine R ankle DF/PF/INV/EV with GTB 3 x 10 ea  - supine R SLR 2 x 10  - sidelying R ankle inversion AROM 2 x 10   Manual Therapy        Therapeutic Activity        Modalities        Neuromuscular re-ed - R/L tandem stance 1 x 20 sec holds ea  - R SLS 1 x 20 sec holds   (shoes off; pain today - discontinue today) - R/L tandem stance 2 x 20 sec holds ea  - R SLS 3 x 20 sec holds  - standing on rockerboard AP taps 2 x 20 reps       Assessment:  Initiated forward and lateral step ups today to assist with improving functional strength for stair negotiation.      Plan: continue PT    Charges:  Ex 3  Total Timed Treatment: 40 min  Total Treatment Time: 45 min

## 2023-05-08 ENCOUNTER — OFFICE VISIT (OUTPATIENT)
Dept: PHYSICAL THERAPY | Age: 23
End: 2023-05-08
Attending: ORTHOPAEDIC SURGERY
Payer: MEDICAID

## 2023-05-08 PROCEDURE — 97110 THERAPEUTIC EXERCISES: CPT

## 2023-05-08 NOTE — PROGRESS NOTES
Diagnosis:  S/p right lateral ligament reconstruction      Next MD visit: none scheduled  Fall Risk: standard         Precautions: n/a    Date of Surgery: 1/9/2023       Medication Changes since last visit?: No    Subjective: Patient reports 0/10 right ankle pain. Objective:     Date: 5/8/2023  Visit #: 11/24 [facility])  exp. 7/2/2023 Date: 5/3/2023  Visit #: 10/24 [facility])  exp.  7/2/2023 Date: 5/1/2023  Visit #: 9/9 ([facility])  exp. 5/14/2023   HEP        Therapeutic Exercise   - NuStep level 6 (LEs only) x 6 minutes  - standing R forward step ups to 4 inch step 2 x 10  - standing R lateral step ups to 4 inch step 2 x 10  - standing B gastroc stretch on slant board level 4 3 x 30 sec holds  - standing B heel raises 2 x 10  - standing R/L hip abduction  - shuttle machine B knee ext/flx 5 bands 3 x 10  - shuttle machine R knee ext/flx 4 bands 2 x 10 - NuStep level 6 (UEs & LEs) x 6 minutes  - supine R ankle DF/PF/INV/EV with Blue Tband 2 x 10 ea  - standing R forward step ups to 4 inch step 1 x 10  - standing R lateral step ups to 4 inch step 1 x 10  - standing R gastroc stretch at wall 3 x 30 sec holds - NuStep level 5 (UEs & LEs) x 7 minutes  - supine R SLR 3 x 10  - sidelying R ankle inversion 3 x 10  - supine R ankle DF/PF 25x ea  - supine R ankle DF/PF/INV/EV with Blue Tband 2 x 10 ea  - standing R/L hip abduction/hip extension with GTB at ankles 2 x 10 ea  - standing B heel raises 2 x 10   - shuttle machine B knee ext/flx 5 bands 2 x 10  - shuttle machine R knee ext/flx 3 bands 3 x 10       Manual Therapy        Therapeutic Activity        Modalities        Neuromuscular re-ed - R/L tandem stance 2 x 20 sec holds ea  - R SLS 3 x 20 sec holds - R/L tandem stance 1 x 20 sec holds ea  - R SLS 1 x 20 sec holds   (shoes off; pain today - discontinue today) - R/L tandem stance 2 x 20 sec holds ea  - R SLS 3 x 20 sec holds  - standing on rockerboard AP taps 2 x 20 reps      Assessment:  Advanced reps of step up exercise and progressed single leg knee extension/flexion on shuttle machine to further progress LE strength and stability.      Plan: continue PT    Charges:  Ex 3  Total Timed Treatment: 42 min  Total Treatment Time: 42 min

## 2023-05-10 ENCOUNTER — OFFICE VISIT (OUTPATIENT)
Dept: PHYSICAL THERAPY | Age: 23
End: 2023-05-10
Attending: ORTHOPAEDIC SURGERY
Payer: MEDICAID

## 2023-05-10 PROCEDURE — 97110 THERAPEUTIC EXERCISES: CPT

## 2023-05-10 NOTE — PROGRESS NOTES
Diagnosis:  S/p right lateral ligament reconstruction      Next MD visit: none scheduled  Fall Risk: standard         Precautions: n/a    Date of Surgery: 1/9/2023       Medication Changes since last visit?: No    Subjective: Patient reports 8.5/10 right ankle pain today. She reports she had her leg elevated last night and her brother accidentally leaned back onto her foot ankle and her ankle bent into a plantarflexed position. She states she has had increased pain since. Objective:     Date: 5/10/2023  Visit #: 12/24 [facility])  exp. 7/2/2023 Date: 5/8/2023  Visit #: 11/24 [facility])  exp. 7/2/2023 Date: 5/3/2023  Visit #: 10/24 [facility])  exp. 7/2/2023   HEP        Therapeutic Exercise   - seated R ankle PROM in all directions x 12 minutes  - seated R ankle DF/PF 20x ea   - NuStep level 6 (LEs only) x 6 minutes  - standing R forward step ups to 4 inch step 2 x 10  - standing R lateral step ups to 4 inch step 2 x 10  - standing B gastroc stretch on slant board level 4 3 x 30 sec holds  - standing B heel raises 2 x 10  - standing R/L hip abduction  - shuttle machine B knee ext/flx 5 bands 3 x 10  - shuttle machine R knee ext/flx 4 bands 2 x 10 - NuStep level 6 (UEs & LEs) x 6 minutes  - supine R ankle DF/PF/INV/EV with Blue Tband 2 x 10 ea  - standing R forward step ups to 4 inch step 1 x 10  - standing R lateral step ups to 4 inch step 1 x 10  - standing R gastroc stretch at wall 3 x 30 sec holds   Manual Therapy        Therapeutic Activity        Modalities   - ice to R ankle in sitting x 10 minutes     Neuromuscular re-ed  - R/L tandem stance 2 x 20 sec holds ea  - R SLS 3 x 20 sec holds - R/L tandem stance 1 x 20 sec holds ea  - R SLS 1 x 20 sec holds   (shoes off; pain today - discontinue today)     Assessment:  Session modified to include gentle passive and active ROM due to increased pain beginning yesterday.        Plan: continue PT    Charges:  Ex 2  Total Timed Treatment: 25 min  Total Treatment Time: 35 min

## 2023-05-15 ENCOUNTER — OFFICE VISIT (OUTPATIENT)
Dept: PHYSICAL THERAPY | Age: 23
End: 2023-05-15
Attending: ORTHOPAEDIC SURGERY
Payer: MEDICAID

## 2023-05-15 PROCEDURE — 97110 THERAPEUTIC EXERCISES: CPT

## 2023-05-15 NOTE — PROGRESS NOTES
Diagnosis:  S/p right lateral ligament reconstruction      Next MD visit: none scheduled  Fall Risk: standard         Precautions: n/a    Date of Surgery: 1/9/2023       Medication Changes since last visit?: No    Subjective: Patient reports 8/10 right ankle pain today. She reports she is having a lot of difficulty standing at work and needs to take rest breaks due to pain. Objective:     Date: 5/15/2023  Visit #: 13/24 [facility])  exp. 7/2/2023 Date: 5/10/2023  Visit #: 12/24 [facility])  exp. 7/2/2023 Date: 5/8/2023  Visit #: 11/24 [facility])  exp. 7/2/2023   HEP        Therapeutic Exercise   - NuStep level 6 (LEs only) x 8 minutes  - supine R ankle DF with INV, DF with EV with GTB 2 x 10 ea  - supine R ankle PF with INV, PF with EV with GTB 2 x 10 ea  - supine R ankle PROM in all directions by clinician x 10 minutes  - seated R ankle DF/PF with rockerboard 2 x 20  - seated R ankle INV/EV with rockerboard 2 x 20  - seated R ankle DF with 3# weight on foot 2 x 10 5 sec holds  - seated R ankle INV/EV 20x ea - seated R ankle PROM in all directions x 12 minutes  - seated R ankle DF/PF 20x ea   - NuStep level 6 (LEs only) x 6 minutes  - standing R forward step ups to 4 inch step 2 x 10  - standing R lateral step ups to 4 inch step 2 x 10  - standing B gastroc stretch on slant board level 4 3 x 30 sec holds  - standing B heel raises 2 x 10  - standing R/L hip abduction  - shuttle machine B knee ext/flx 5 bands 3 x 10  - shuttle machine R knee ext/flx 4 bands 2 x 10   Manual Therapy   - R ankle joint mobilization grade 3 into plantarflexion x 3 minutes     Therapeutic Activity        Modalities    - ice to R ankle in sitting x 10 minutes    Neuromuscular re-ed   - R/L tandem stance 2 x 20 sec holds ea  - R SLS 3 x 20 sec holds     Assessment:  Session modified due to high pain levels.   Despite high pain levels, pt displaying improved R ankle ROM and strength during performance of therapeutic exercises.     Plan: continue PT    Charges:  Ex 3  Total Timed Treatment: 45 min  Total Treatment Time: 50 min

## 2023-05-17 ENCOUNTER — OFFICE VISIT (OUTPATIENT)
Dept: PHYSICAL THERAPY | Age: 23
End: 2023-05-17
Attending: ORTHOPAEDIC SURGERY
Payer: MEDICAID

## 2023-05-17 PROCEDURE — 97110 THERAPEUTIC EXERCISES: CPT

## 2023-05-17 NOTE — PROGRESS NOTES
Diagnosis:  S/p right lateral ligament reconstruction      Next MD visit: none scheduled  Fall Risk: standard         Precautions: n/a    Date of Surgery: 1/9/2023       Medication Changes since last visit?: No    Subjective: Patient reports 6/10 right ankle pain today. She reports she was off work for a few days. Objective:    5/17/2023:  Reduced global R ankle edema     Date: 5/17/2023  Visit #: 14/24 [facility])  exp. 7/2/2023 Date: 5/15/2023  Visit #: 13/24 [facility])  exp. 7/2/2023 Date: 5/10/2023  Visit #: 12/24 [facility])  exp.  7/2/2023   HEP   - size D tubagrip provided     Therapeutic Exercise   - NuStep level 6 (LEs only) x 7 minutes  - shuttle machine B knee ext/flx 5 bands 3 x 10  - shuttle machine R knee ext/flx 4 bands 2 x 10  - shuttle machine B heel raises 2 bands 1 x 10  - standing B heel raises 10x  - standing B heel/toe raises 10x ea  - standing R/L hip abduction/hip extension/hip flexion with RTB at ankles 2 x 10 ea  - supine R ankle DF with INV, DF with EV with GTB 2 x 10 ea  - supine R ankle PF with INV, PF with EV with GTB 2 x 10 ea  - sidelying R/L hip abduction 2 x 10 ea  - sidelying R ankle inversion 2 x 10  - seated R ankle DF 3 x 10  - R SLS 3 reps 10 sec holds - NuStep level 6 (LEs only) x 8 minutes  - supine R ankle DF with INV, DF with EV with GTB 2 x 10 ea  - supine R ankle PF with INV, PF with EV with GTB 2 x 10 ea  - supine R ankle PROM in all directions by clinician x 10 minutes  - seated R ankle DF/PF with rockerboard 2 x 20  - seated R ankle INV/EV with rockerboard 2 x 20  - seated R ankle DF with 3# weight on foot 2 x 10 5 sec holds  - seated R ankle INV/EV 20x ea - seated R ankle PROM in all directions x 12 minutes  - seated R ankle DF/PF 20x ea     Manual Therapy    - R ankle joint mobilization grade 3 into plantarflexion x 3 minutes    Therapeutic Activity        Modalities     - ice to R ankle in sitting x 10 minutes   Neuromuscular re-ed        Assessment: Patient displaying reduced overall gait deviations when ambulating in clinic as well as reduced noted global right ankle edema. Advanced gastroc strengthening interventions. Pt remains most limited in R ankle invertor strength.     Plan: continue PT    Charges:  Ex 3  Total Timed Treatment: 45 min  Total Treatment Time: 45 min

## 2023-05-22 ENCOUNTER — OFFICE VISIT (OUTPATIENT)
Dept: PHYSICAL THERAPY | Age: 23
End: 2023-05-22
Attending: ORTHOPAEDIC SURGERY
Payer: MEDICAID

## 2023-05-22 PROCEDURE — 97110 THERAPEUTIC EXERCISES: CPT

## 2023-05-22 NOTE — PROGRESS NOTES
Diagnosis:  S/p right lateral ligament reconstruction      Next MD visit: June 28, 2023  Fall Risk: standard         Precautions: n/a    Date of Surgery: 1/9/2023       Medication Changes since last visit?: No    Subjective: Patient reports 2-3/10 right ankle pain today. She reports her ankle is doing a little better. Objective:    5/17/2023:  Reduced global R ankle edema     Date: 5/22/2023  Visit #: 15/24 [facility])  exp. 7/2/2023 Date: 5/17/2023  Visit #: 14/24 [facility])  exp. 7/2/2023 Date: 5/15/2023  Visit #: 13/24 [facility])  exp.  7/2/2023   HEP    - size D tubagrip provided    Therapeutic Exercise   - NuStep level 6 (LEs only) x 8 minutes  - shuttle machine R/L knee ext/flx 4 bands 3 x 10 ea  - shuttle machine B heel raises 3 bands 2 x 10  - standing B heel raises 2 x 10  - standing R forward step ups to 6 inch step 1 x 10  - standing R lateral step ups to 6 inch step 1 x 10  - supine R ankle DF with INV, DF with EV with GTB 2 x 10 ea  - supine R ankle PF with INV, PF with EV with GTB 2 x 10 ea  - supine R ankle PROM in all directions x 8 minutes  - R SLS 3 reps 15 sec holds  - R/L tandem stance 2 x 20 sec holds each - NuStep level 6 (LEs only) x 7 minutes  - shuttle machine B knee ext/flx 5 bands 3 x 10  - shuttle machine R knee ext/flx 4 bands 3 x 10  - shuttle machine B heel raises 2 bands 1 x 10  - standing B heel raises 10x  - standing B heel/toe raises 10x ea  - standing R/L hip abduction/hip extension/hip flexion with RTB at ankles 2 x 10 ea  - supine R ankle DF with INV, DF with EV with GTB 2 x 10 ea  - supine R ankle PF with INV, PF with EV with GTB 2 x 10 ea  - sidelying R/L hip abduction 2 x 10 ea  - sidelying R ankle inversion 2 x 10  - seated R ankle DF 3 x 10  - R SLS 3 reps 10 sec holds - NuStep level 6 (LEs only) x 8 minutes  - supine R ankle DF with INV, DF with EV with GTB 2 x 10 ea  - supine R ankle PF with INV, PF with EV with GTB 2 x 10 ea  - supine R ankle PROM in all directions by clinician x 10 minutes  - seated R ankle DF/PF with rockerboard 2 x 20  - seated R ankle INV/EV with rockerboard 2 x 20  - seated R ankle DF with 3# weight on foot 2 x 10 5 sec holds  - seated R ankle INV/EV 20x ea   Manual Therapy     - R ankle joint mobilization grade 3 into plantarflexion x 3 minutes   Therapeutic Activity        Modalities        Neuromuscular re-ed        Assessment:  Advanced gastroc strengthening interventions today and balance challenges. Pt displaying notable improved R ankle mobility.     Plan: continue PT    Charges:  Ex 3  Total Timed Treatment: 50 min  Total Treatment Time: 50 min

## 2023-05-24 ENCOUNTER — OFFICE VISIT (OUTPATIENT)
Dept: PHYSICAL THERAPY | Age: 23
End: 2023-05-24
Attending: ORTHOPAEDIC SURGERY
Payer: MEDICAID

## 2023-05-24 PROCEDURE — 97110 THERAPEUTIC EXERCISES: CPT

## 2023-05-24 NOTE — PROGRESS NOTES
Diagnosis:  S/p right lateral ligament reconstruction      Next MD visit: June 28, 2023  Fall Risk: standard         Precautions: n/a    Date of Surgery: 1/9/2023       Medication Changes since last visit?: No    Subjective: Patient reports 7/10 right ankle pain today. She reports worked the last two days and her ankle has been bothering her a lot. Objective:    5/17/2023:  Reduced global R ankle edema    5/24/2023:  Pt with notable deviations - decreased weightbearing on RLE & notable over-supination noted; increased weightbearing through lateral portion of foot     Date: 5/24/2023  Visit #: 16/24 [facility])  exp. 7/2/2023 Date: 5/22/2023  Visit #: 15/24 [facility])  exp. 7/2/2023 Date: 5/17/2023  Visit #: 14/24 [facility])  exp.  7/2/2023   HEP     - size D tubagrip provided   Therapeutic Exercise   - NuStep level 5 (LEs only) x 8 minutes  - sidelying R/L hip abduction 2 x 10 ea  - supine R SLR 2 x 10   - supine SB bridges 2 x 10  - supine R ankle PROM in all directions x 8 minutes  - seated R ankle DF/PF on rockerboard 2 x 20 - NuStep level 6 (LEs only) x 8 minutes  - shuttle machine R/L knee ext/flx 4 bands 3 x 10 ea  - shuttle machine B heel raises 3 bands 2 x 10  - standing B heel raises 2 x 10  - standing R forward step ups to 6 inch step 1 x 10  - standing R lateral step ups to 6 inch step 1 x 10  - supine R ankle DF with INV, DF with EV with GTB 2 x 10 ea  - supine R ankle PF with INV, PF with EV with GTB 2 x 10 ea  - supine R ankle PROM in all directions x 8 minutes  - R SLS 3 reps 15 sec holds  - R/L tandem stance 2 x 20 sec holds each - NuStep level 6 (LEs only) x 7 minutes  - shuttle machine B knee ext/flx 5 bands 3 x 10  - shuttle machine R knee ext/flx 4 bands 3 x 10  - shuttle machine B heel raises 2 bands 1 x 10  - standing B heel raises 10x  - standing B heel/toe raises 10x ea  - standing R/L hip abduction/hip extension/hip flexion with RTB at ankles 2 x 10 ea  - supine R ankle DF with INV, DF with EV with GTB 2 x 10 ea  - supine R ankle PF with INV, PF with EV with GTB 2 x 10 ea  - sidelying R/L hip abduction 2 x 10 ea  - sidelying R ankle inversion 2 x 10  - seated R ankle DF 3 x 10  - R SLS 3 reps 10 sec holds   Manual Therapy        Therapeutic Activity        Modalities        Neuromuscular re-ed        Assessment:  Session modified due to highly irritable pain levels today. Focused on hip strengthening and gentle PROM stretching.     Plan: continue PT    Charges:  Ex 2  Total Timed Treatment: 30 min  Total Treatment Time: 40 min

## 2023-06-05 ENCOUNTER — TELEPHONE (OUTPATIENT)
Dept: ORTHOPEDICS CLINIC | Facility: CLINIC | Age: 23
End: 2023-06-05

## 2023-06-05 NOTE — TELEPHONE ENCOUNTER
S/P Right ankle sx 1/9/23 patient reports right ankle pain worsening over past couple weeks. Reports pain is 8/10 to right ankle when working reports is a  at [employer] working currently 5 days a week 8-9 hours. Rates pain 3/10 to right ankle when not working. Reports has been icing, elevating but swelling persists. Reports right ankle is so swollen that she can not put her brace on. Patient is scheduled to follow up 6/28/23. Patient wondering if her work restrictions need to be changed or needs to be seen sooner-next clinic day 6/14/23? Please advise     Lv is still doing physical therapy as well.

## 2023-06-05 NOTE — TELEPHONE ENCOUNTER
Per pt she has a post op 6/28 and asking if she can be seen sooner due to the high pain level.  No appointments please advise

## 2023-06-06 NOTE — TELEPHONE ENCOUNTER
S/w patient. We had opening for 6/14/23 at 4:30 which I changed from her appointment on 6/28/23. Patient feels like her work is what is aggravating the pain. She states that the restrictions that we changed for her helped a little, but her pain is still increased at work. Patient is wondering if she could have off work until the appointment on 6/14/23 to assess? Please advise.

## 2023-06-07 NOTE — TELEPHONE ENCOUNTER
New letter sent to patient via [employer].  S/w patient and informed her that it had been sent through [facility]

## 2023-06-14 ENCOUNTER — OFFICE VISIT (OUTPATIENT)
Dept: ORTHOPEDICS CLINIC | Facility: CLINIC | Age: 23
End: 2023-06-14

## 2023-06-14 ENCOUNTER — APPOINTMENT (OUTPATIENT)
Dept: PHYSICAL THERAPY | Age: 23
End: 2023-06-14
Attending: FAMILY MEDICINE
Payer: MEDICAID

## 2023-06-14 ENCOUNTER — HOSPITAL ENCOUNTER (OUTPATIENT)
Dept: GENERAL RADIOLOGY | Age: 23
Discharge: HOME | End: 2023-06-14
Attending: ORTHOPAEDIC SURGERY
Payer: MEDICAID

## 2023-06-14 DIAGNOSIS — S93.491D TEAR OF TALOFIBULAR LIGAMENT OF RIGHT LOWER EXTREMITY, SUBSEQUENT ENCOUNTER: ICD-10-CM

## 2023-06-14 DIAGNOSIS — M62.461 CONTRACTURE OF MUSCLE OF RIGHT LOWER LEG: ICD-10-CM

## 2023-06-14 DIAGNOSIS — E66.01 MORBID OBESITY WITH BMI OF 60.0-69.9, ADULT (HCC): ICD-10-CM

## 2023-06-14 DIAGNOSIS — M84.374A STRESS FRACTURE OF NAVICULAR BONE OF RIGHT FOOT: ICD-10-CM

## 2023-06-14 DIAGNOSIS — M65.9 SYNOVITIS OF RIGHT ANKLE: Primary | ICD-10-CM

## 2023-06-14 PROCEDURE — 73630 X-RAY EXAM OF FOOT: CPT | Performed by: ORTHOPAEDIC SURGERY

## 2023-06-14 PROCEDURE — 99214 OFFICE O/P EST MOD 30 MIN: CPT | Performed by: ORTHOPAEDIC SURGERY

## 2023-06-19 ENCOUNTER — OFFICE VISIT (OUTPATIENT)
Dept: PHYSICAL THERAPY | Age: 23
End: 2023-06-19
Attending: FAMILY MEDICINE
Payer: MEDICAID

## 2023-06-19 PROCEDURE — 97110 THERAPEUTIC EXERCISES: CPT

## 2023-06-19 NOTE — PROGRESS NOTES
Diagnosis:  S/p right lateral ligament reconstruction      Next MD visit: June 28, 2023  Fall Risk: standard         Precautions: n/a    Date of Surgery: 1/9/2023       Medication Changes since last visit?: No    Subjective: Patient reports 1/10 right ankle pain today. She reports she saw her surgeon who recommended taking time off work and she reports this has helped her a lot. Objective:    6/19/2023:  Notable reduced global R ankle edema noted; most notable surrounding medial malleolus     Date: 6/19/2023  Visit #: 17/24 [facility])  exp. 7/2/2023 Date: 5/24/2023  Visit #: 16/24 [facility])  exp. 7/2/2023 Date: 5/22/2023  Visit #: 15/24 [facility])  exp.  7/2/2023   HEP        Therapeutic Exercise   - NuStep level 6 (LEs only) x 8 minutes  - supine R ankle DF with INV, DF with EV with BlueTB 2 x 10 ea  - supine R ankle PF with INV, PF with EV with BlueTB 2 x 10 ea  - supine R ankle PF with RTB Blue Tband 2 x 10  - sidelying R ankle inversion 3 x 10  - seated R ankle INV/EV windshield wiper 2 x 10 ea  - shuttle machine R/L knee ext/flx 5 bands 3 x 10   - shuttle machine B heel raises 3 bands 2 x 10 ea   - NuStep level 5 (LEs only) x 8 minutes  - sidelying R/L hip abduction 2 x 10 ea  - supine R SLR 2 x 10   - supine SB bridges 2 x 10  - supine R ankle PROM in all directions x 8 minutes  - seated R ankle DF/PF on rockerboard 2 x 20 - NuStep level 6 (LEs only) x 8 minutes  - shuttle machine R/L knee ext/flx 4 bands 3 x 10 ea  - shuttle machine B heel raises 3 bands 2 x 10  - standing B heel raises 2 x 10  - standing R forward step ups to 6 inch step 1 x 10  - standing R lateral step ups to 6 inch step 1 x 10  - supine R ankle DF with INV, DF with EV with GTB 2 x 10 ea  - supine R ankle PF with INV, PF with EV with GTB 2 x 10 ea  - supine R ankle PROM in all directions x 8 minutes  - R SLS 3 reps 15 sec holds  - R/L tandem stance 2 x 20 sec holds each   Manual Therapy        Therapeutic Activity Modalities        Neuromuscular re-ed        Assessment:  Patient displaying overall reduction in R ankle edema. Able to tolerated progression of intensity on leg press machine today.      Plan: continue PT    Charges:  Ex 3  Total Timed Treatment: 43 min  Total Treatment Time: 45 min

## 2023-06-21 ENCOUNTER — OFFICE VISIT (OUTPATIENT)
Dept: PHYSICAL THERAPY | Age: 23
End: 2023-06-21
Attending: FAMILY MEDICINE
Payer: MEDICAID

## 2023-06-21 PROCEDURE — 97110 THERAPEUTIC EXERCISES: CPT

## 2023-06-21 NOTE — PROGRESS NOTES
Diagnosis:  S/p right lateral ligament reconstruction      Next MD visit: June 28, 2023  Fall Risk: standard         Precautions: n/a    Date of Surgery: 1/9/2023       Medication Changes since last visit?: No    Subjective: Patient reports 1-2/10 right ankle pain today. She reports she saw her surgeon who recommended taking time off work and she reports this has helped her a lot. Objective:    6/19/2023:  Notable reduced global R ankle edema noted; most notable surrounding medial malleolus     Date: 6/21/2023  Visit #: 18/24 [facility])  exp. 7/2/2023   HEP      Therapeutic Exercise   - supine R ankle DF with INV, DF with EV with BlueTB 2 x 10 ea  - supine R ankle PF with INV, PF with EV with BlueTB 2 x 10 ea  - supine R ankle PF with Blue Tband 2 x 10  - shuttle machine B knee ext/flx 6 bands 3 x 10  - shuttle machine R/L knee ext/flx 5 bands 3 x 10   - R forward step ups to 6 inch step 10x  - R lateral step ups to 6 inch step 10x  - standing R/L hip abduction/hip extension with 3# ankle weights 3 x 10 ea  - standing R/L marching 3# 30x         Manual Therapy      Therapeutic Activity   Gait training with emphasis on reducing excess R foot supination & encouraging heel/toe pattern   Modalities      Neuromuscular re-ed R SLS 2 x 30 sec holds      Assessment:  Patient displaying improved tolerance to standing weightbearing activities due to decreased ankle pain.     Plan: continue PT    Charges:  Ex 3  Total Timed Treatment: 45 min  Total Treatment Time: 45 min

## 2023-06-23 ENCOUNTER — OFFICE VISIT (OUTPATIENT)
Dept: FAMILY MEDICINE CLINIC | Facility: CLINIC | Age: 23
End: 2023-06-23

## 2023-06-23 VITALS
WEIGHT: 293 LBS | HEART RATE: 78 BPM | BODY MASS INDEX: 68 KG/M2 | SYSTOLIC BLOOD PRESSURE: 138 MMHG | DIASTOLIC BLOOD PRESSURE: 88 MMHG

## 2023-06-23 DIAGNOSIS — G44.209 TENSION HEADACHE: ICD-10-CM

## 2023-06-23 DIAGNOSIS — N92.6 ABNORMAL MENSTRUAL PERIODS: Primary | ICD-10-CM

## 2023-06-23 PROCEDURE — 99214 OFFICE O/P EST MOD 30 MIN: CPT | Performed by: NURSE PRACTITIONER

## 2023-06-23 PROCEDURE — 3079F DIAST BP 80-89 MM HG: CPT | Performed by: NURSE PRACTITIONER

## 2023-06-23 PROCEDURE — 3075F SYST BP GE 130 - 139MM HG: CPT | Performed by: NURSE PRACTITIONER

## 2023-06-23 RX ORDER — IBUPROFEN 600 MG/1
600 TABLET ORAL EVERY 6 HOURS PRN
Qty: 40 TABLET | Refills: 0 | Status: SHIPPED | OUTPATIENT
Start: 2023-06-23 | End: 2023-07-23

## 2023-06-23 RX ORDER — CYCLOBENZAPRINE HCL 5 MG
5 TABLET ORAL 3 TIMES DAILY PRN
Qty: 30 TABLET | Refills: 0 | Status: SHIPPED | OUTPATIENT
Start: 2023-06-23

## 2023-06-26 ENCOUNTER — OFFICE VISIT (OUTPATIENT)
Dept: PHYSICAL THERAPY | Age: 23
End: 2023-06-26
Attending: FAMILY MEDICINE
Payer: MEDICAID

## 2023-06-26 PROCEDURE — 97110 THERAPEUTIC EXERCISES: CPT

## 2023-06-26 NOTE — PROGRESS NOTES
Diagnosis:  S/p right lateral ligament reconstruction      Next MD visit: June 28, 2023  Fall Risk: standard         Precautions: n/a    Date of Surgery: 1/9/2023       Medication Changes since last visit?: No    Subjective: Patient reports her ankle is doing better. She reports it is a bit more swollen today though. Pt reports 2/10 right ankle pain. Objective:    6/19/2023:  Notable reduced global R ankle edema noted; most notable surrounding medial malleolus     Date: 6/26/2023  Visit #: 19/24 [facility])  exp. 7/2/2023 Date: 6/21/2023  Visit #: 18/24 [facility])  exp. 7/2/2023   HEP       Therapeutic Exercise   - NuStep level 6 (LEs only) x 8 minutes  - supine R ankle DF with INV, DF with EV with BlueTB 3 x 10 ea  - supine R ankle PF with INV, PF with EV with BlueTB 3 x 10 ea  - supine PROM R ankle in all planes x 8 minutes  - standing B heel raises 3 x 10  - standing R/L hip abd/hip ext/hip flx with GTB at ankles 2 x 10 ea  - shuttle machine B knee ext/flx 6 bands 3 x 10  - shuttle machine R/L knee ext/flx 5 bands 3 x 10  - supine R ankle DF with INV, DF with EV with BlueTB 2 x 10 ea  - supine R ankle PF with INV, PF with EV with BlueTB 2 x 10 ea  - supine R ankle PF with Blue Tband 2 x 10  - shuttle machine B knee ext/flx 6 bands 3 x 10  - shuttle machine R/L knee ext/flx 5 bands 3 x 10   - R forward step ups to 6 inch step 10x  - R lateral step ups to 6 inch step 10x  - standing R/L hip abduction/hip extension with 3# ankle weights 3 x 10 ea  - standing R/L marching 3# 30x         Manual Therapy       Therapeutic Activity    Gait training with emphasis on reducing excess R foot supination & encouraging heel/toe pattern   Modalities       Neuromuscular re-ed R SLS with forward reach 10x  R/L tandem stance 2 x 30 sec ea R SLS 2 x 30 sec holds      Assessment:  Initiated dynamic single leg balance challenges.     Plan: continue PT    Charges:  Ex 3  Total Timed Treatment: 45 min  Total Treatment Time: 45 min

## 2023-06-28 ENCOUNTER — OFFICE VISIT (OUTPATIENT)
Dept: PHYSICAL THERAPY | Age: 23
End: 2023-06-28
Attending: FAMILY MEDICINE
Payer: MEDICAID

## 2023-06-28 PROCEDURE — 97110 THERAPEUTIC EXERCISES: CPT

## 2023-07-03 ENCOUNTER — TELEPHONE (OUTPATIENT)
Dept: ORTHOPEDICS CLINIC | Facility: CLINIC | Age: 23
End: 2023-07-03

## 2023-07-03 ENCOUNTER — OFFICE VISIT (OUTPATIENT)
Dept: PHYSICAL THERAPY | Age: 23
End: 2023-07-03
Attending: FAMILY MEDICINE
Payer: MEDICAID

## 2023-07-03 PROCEDURE — 97110 THERAPEUTIC EXERCISES: CPT

## 2023-07-03 NOTE — TELEPHONE ENCOUNTER
Pt calling states FMLA paperwork was sent on 6/29 asking was it received company needs back asap please advise

## 2023-07-05 ENCOUNTER — OFFICE VISIT (OUTPATIENT)
Dept: PHYSICAL THERAPY | Age: 23
End: 2023-07-05
Attending: FAMILY MEDICINE
Payer: MEDICAID

## 2023-07-05 PROCEDURE — 97110 THERAPEUTIC EXERCISES: CPT

## 2023-07-05 NOTE — PROGRESS NOTES
Diagnosis:  S/p right lateral ligament reconstruction      Next MD visit: July 26, 2023  Fall Risk: standard         Precautions: n/a    Date of Surgery: 1/9/2023       Medication Changes since last visit?: No    Subjective:  Patient reports 4/10 ankle pain today. Objective:    7/3/2023: Ankle ROM (R):  DF: R 4 deg  PF: R 55 deg  INV: R 20 deg  EV: R 20 deg    7/3/2023: Ankle MMT (R):  DF: R 5/5  PF: not tested  INV: R 4/5  EV: R 4+/5       Date: 7/5/2023  Visit #: 22/24 [facility]) exp. Date: 7/3/2023  Visit #: 21/24 [facility])  exp. 7/2/2023 Date: 6/28/2023  Visit #: 20/24 [facility])  exp.  7/2/2023   HEP    - black Tband provided for home use    Therapeutic Exercise   - NuStep level 6 (LEs only) x 9 minutes  - supine R ankle DF/PF/INV/EV with black Tband 2 x 10 ea  - sidelying R ankle inversion 1 x 15  - seated R ankle circles CW/CCW 20x ea  - standing B heel raises 2 x 10  - R SLS on airex 2 x 30 sec holds  - standing R gastroc stretch at wall 3 x 30 sec holds - reassessment  - supine R ankle DF/PF/INV/EV with black Tband 2 x 10 ea  - supine R ankle PROM in all directions by clinician x 8 minutes  - seated R ankle DF 2 x 20  - seated R ankle INV 20x  - seated R ankle EV 20x  - seated R ankle circles CW/CCW 20x ea  - standing B heel raises 3 x 10  - R SLS on airex 2 x 30 sec holds  - R/L hip flexion/abduction/hip extension on airex with GTB at ankles 1 x 10 ea  - shuttle machine B Knee ext/flx 6 bands 3 x 10   - NuStep level 6 (LEs only) x 9 minutes  - supine R ankle DF with INV, DF with EV with BlueTB 3 x 10 ea  - supine R ankle PF with INV, PF with EV with BlueTB 3 x 10 ea  - supine PROM R ankle in all planes x 6 minutes  - seated R ankle inversion AROM 2 x 10  - R forward lunges onto bosu 10x  - R lateral lunges onto bosu 10x  - R SLS 3 x 30 sec holds  - B heel raises 3 x 10   Manual Therapy        Therapeutic Activity        Modalities   Ice pack to right ankle in supine x 15 minutes Neuromuscular re-ed        Assessment:  Modified session today due to increased soreness.       Plan: continue PT    Charges:  Ex 2  Total Timed Treatment: 35 min  Total Treatment Time: 50 min

## 2023-07-05 NOTE — TELEPHONE ENCOUNTER
Spoke with pt today let her know forms were not received. Gave pt our [phone] fax number. Pt is out of work due to ankle pain, she had surgery in [place] for the ankle.  Start 6/14/23 pending re eval on 7/26/23

## 2023-07-20 NOTE — TELEPHONE ENCOUNTER
Dr. Princess Rehman,     Please sign off on form if you agree to: FMLA due to right ankle pain, 6/14/23- pending re eval 7/26/23  (Please place your signature on the first page only)    -From your Inbasket, Highlight the patient and click Chart   -Double click the 3/8/01 Forms Completion telephone encounter  -Scroll down to the Media section   -Click the blue Hyperlink: FMLA Dr Princess Rehman 9/81/96  -Click Acknowledge located at the bottom right corner (if you do not see acknowledge, try maximizing your window)   -Drag the mouse into the blank space of the document and a + sign will appear. Left click to   electronically sign the document.      Thank you,    Georgina Horton

## 2023-07-21 ENCOUNTER — TELEPHONE (OUTPATIENT)
Dept: ORTHOPEDICS CLINIC | Facility: CLINIC | Age: 23
End: 2023-07-21

## 2023-07-21 ENCOUNTER — OFFICE VISIT (OUTPATIENT)
Dept: PHYSICAL THERAPY | Age: 23
End: 2023-07-21
Attending: FAMILY MEDICINE
Payer: MEDICAID

## 2023-07-21 PROCEDURE — 97110 THERAPEUTIC EXERCISES: CPT

## 2023-07-21 NOTE — PROGRESS NOTES
Diagnosis:  S/p right lateral ligament reconstruction      Next MD visit: July 26, 2023  Fall Risk: standard         Precautions: n/a    Date of Surgery: 1/9/2023       Medication Changes since last visit?: No    Subjective:  Patient reports 5/10 ankle pain today. Objective:    7/3/2023: Ankle ROM (R):  DF: R 4 deg  PF: R 55 deg  INV: R 20 deg  EV: R 20 deg    7/3/2023: Ankle MMT (R):  DF: R 5/5  PF: not tested  INV: R 4/5  EV: R 4+/5       Date: 7/21/2023  Visit #: 23/27 [facility]) exp. 9/1/2023 Date: 7/5/2023  Visit #: 22/27 [facility]) exp. 9/1/2023 Date: 7/3/2023  Visit #: 21/24 [facility])  exp.  7/2/2023   HEP     - black Tband provided for home use   Therapeutic Exercise   - walking on TM 1.2 mph x 10 minutes  - supine R ankle DF/PF/INV/EV with black Tband 2 x 10 ea  - sidelying R ankle inversion with 2 x 10  - standing R/L hip abduction/hip extension with blue Tband 2 x 10 ea  - standing B heel raises 3 x 10  - shuttle machine B knee ext/flx 6 bands 3 x 12   - NuStep level 6 (LEs only) x 9 minutes  - supine R ankle DF/PF/INV/EV with black Tband 2 x 10 ea  - sidelying R ankle inversion 1 x 15  - seated R ankle circles CW/CCW 20x ea  - standing B heel raises 2 x 10  - R SLS on airex 2 x 30 sec holds  - standing R gastroc stretch at wall 3 x 30 sec holds - reassessment  - supine R ankle DF/PF/INV/EV with black Tband 2 x 10 ea  - supine R ankle PROM in all directions by clinician x 8 minutes  - seated R ankle DF 2 x 20  - seated R ankle INV 20x  - seated R ankle EV 20x  - seated R ankle circles CW/CCW 20x ea  - standing B heel raises 3 x 10  - R SLS on airex 2 x 30 sec holds  - R/L hip flexion/abduction/hip extension on airex with GTB at ankles 1 x 10 ea  - shuttle machine B Knee ext/flx 6 bands 3 x 10     Manual Therapy        Therapeutic Activity        Modalities    Ice pack to right ankle in supine x 15 minutes    Neuromuscular re-ed - R SLS on airex 2 x 30 sec holds  - R/L tandem stance on long airex 3 x 20 sec holds each       Assessment:  Initiated walking on treadmill to improved gait pattern and LE endurance.     Plan: continue PT    Charges:  Ex 3  Total Timed Treatment: 40 min  Total Treatment Time: 45 min

## 2023-07-21 NOTE — TELEPHONE ENCOUNTER
Returned pt call, pt calling on status on fmla forms. Informed pt we are awaiting providers signature. Once form signed we will fax out and send pt a [employer] message. Pt verbalized understanding. New enc made to not interrupt signature.

## 2023-07-25 ENCOUNTER — OFFICE VISIT (OUTPATIENT)
Dept: PHYSICAL THERAPY | Age: 23
End: 2023-07-25
Attending: FAMILY MEDICINE
Payer: MEDICAID

## 2023-07-25 PROCEDURE — 97140 MANUAL THERAPY 1/> REGIONS: CPT

## 2023-07-25 PROCEDURE — 97110 THERAPEUTIC EXERCISES: CPT

## 2023-07-25 NOTE — PROGRESS NOTES
Physical Therapy Progress Summary  Diagnosis:  S/p right lateral ligament reconstruction      Next MD visit: July 26, 2023  Fall Risk: standard         Precautions: n/a    Date of Surgery: 1/9/2023       Medication Changes since last visit?: No    Subjective:  Patient reports 5/10 medial right ankle pain today. She reports walking and standing have improved overall, but she will still get intermittent sharp pains. Objective:    7/25/2023: Ankle ROM (R):  DF: R 7 deg  PF: R 58 deg  INV: R 20 deg  EV: R 20 deg    Ankle MMT (R):  DF: R 5/5  PF: R 4/5  INV: R 4/5  EV: R 4+/5     Date: 7/25/2023  Visit #: 24/27 [facility]) exp. 9/1/2023 Date: 7/21/2023  Visit #: 23/27 [facility]) exp. 9/1/2023   HEP   - updated HEP - see pt instructions section    Therapeutic Exercise   - supine R ankle DF/PF/INV/EV with black Tband 3 x 10 ea  - standing B heel raises 3 x 10  - shuttle machine B knee ext/flx 7 bands 2 x 10  - shuttle machine R/L knee ext/flx 5 bands 2 x 10 ea  - standing R/L gastroc stretch at wall 2 x 30 sec holds ea  - standing B ankle DF at wall 2 x 10  - standing B heel raises 3 x 10  - reassessment  X 35 minutes  - walking on TM 1.2 mph x 10 minutes  - supine R ankle DF/PF/INV/EV with black Tband 2 x 10 ea  - sidelying R ankle inversion with 2 x 10  - standing R/L hip abduction/hip extension with blue Tband 2 x 10 ea  - standing B heel raises 3 x 10  - shuttle machine B knee ext/flx 6 bands 3 x 12     Manual Therapy   - supine ankle PF/DF/INV/EV stretching with distraction x 8 minutes    Therapeutic Activity       Modalities       Neuromuscular re-ed  - R SLS on airex 2 x 30 sec holds  - R/L tandem stance on long airex 3 x 20 sec holds each     Assessment:  Advanced resistance on shuttle machine today. Patient displaying improved R ankle ROM and strength observed this session along with fewer gait deviations.     Plan: continue PT     Charges:  Ex 2 Man 1  Total Timed Treatment: 43 min  Total Treatment Time: 43 min

## 2023-07-25 NOTE — TELEPHONE ENCOUNTER
Dr. Laverne Hayden,     Please sign off on form if you agree to: FMLA due to right ankle pain, 6/14/23- pending re eval 7/26/23  (Please place your signature on the first page only)     -From your Inbasket, Highlight the patient and click Chart   -Double click the 9/2/14 Forms Completion telephone encounter  -Scroll down to the Media section   -Click the blue Hyperlink: FMLA Dr Laverne Hayden 8/77/72  -Click Acknowledge located at the bottom right corner (if you do not see acknowledge, try maximizing your window)   -Drag the mouse into the blank space of the document and a + sign will appear. Left click to   electronically sign the document. Thank you,   [facility].

## 2023-07-26 ENCOUNTER — OFFICE VISIT (OUTPATIENT)
Dept: ORTHOPEDICS CLINIC | Facility: CLINIC | Age: 23
End: 2023-07-26

## 2023-07-26 ENCOUNTER — HOSPITAL ENCOUNTER (OUTPATIENT)
Dept: GENERAL RADIOLOGY | Age: 23
Discharge: HOME | End: 2023-07-26
Attending: ORTHOPAEDIC SURGERY
Payer: MEDICAID

## 2023-07-26 DIAGNOSIS — M84.374A STRESS FRACTURE OF NAVICULAR BONE OF RIGHT FOOT: Primary | ICD-10-CM

## 2023-07-26 DIAGNOSIS — M84.374A STRESS FRACTURE OF NAVICULAR BONE OF RIGHT FOOT: ICD-10-CM

## 2023-07-26 PROCEDURE — 99214 OFFICE O/P EST MOD 30 MIN: CPT | Performed by: ORTHOPAEDIC SURGERY

## 2023-07-26 PROCEDURE — 73630 X-RAY EXAM OF FOOT: CPT | Performed by: ORTHOPAEDIC SURGERY

## 2023-07-27 NOTE — TELEPHONE ENCOUNTER
Revised FMLA completed and faxed to [facility] [phone].  Sent pt [employer] message

## 2023-08-04 ENCOUNTER — HOSPITAL ENCOUNTER (OUTPATIENT)
Age: 23
Discharge: HOME | End: 2023-08-04
Payer: MEDICAID

## 2023-08-04 VITALS
SYSTOLIC BLOOD PRESSURE: 133 MMHG | HEART RATE: 79 BPM | DIASTOLIC BLOOD PRESSURE: 76 MMHG | BODY MASS INDEX: 48.82 KG/M2 | WEIGHT: 293 LBS | OXYGEN SATURATION: 97 % | HEIGHT: 65 IN | TEMPERATURE: 98 F | RESPIRATION RATE: 24 BRPM

## 2023-08-04 DIAGNOSIS — N93.8 DYSFUNCTIONAL UTERINE BLEEDING: Primary | ICD-10-CM

## 2023-08-04 LAB
#MXD IC: 0.2 X10ˆ3/UL (ref 0.1–1)
B-HCG UR QL: NEGATIVE
BILIRUB UR QL STRIP: NEGATIVE
BUN BLD-MCNC: 6 MG/DL (ref 7–18)
CHLORIDE BLD-SCNC: 104 MMOL/L (ref 98–112)
CO2 BLD-SCNC: 25 MMOL/L (ref 21–32)
CREAT BLD-MCNC: 0.7 MG/DL
EGFRCR SERPLBLD CKD-EPI 2021: 123 ML/MIN/1.73M2 (ref 60–?)
GLUCOSE BLD-MCNC: 96 MG/DL (ref 70–99)
GLUCOSE UR STRIP-MCNC: NEGATIVE MG/DL
HCT VFR BLD AUTO: 38.7 %
HCT VFR BLD CALC: 38 %
HGB BLD-MCNC: 12.1 G/DL
ISTAT IONIZED CALCIUM FOR CHEM 8: 1.19 MMOL/L (ref 1.12–1.32)
KETONES UR STRIP-MCNC: NEGATIVE MG/DL
LEUKOCYTE ESTERASE UR QL STRIP: NEGATIVE
LYMPHOCYTES # BLD AUTO: 1.7 X10ˆ3/UL (ref 1–4)
LYMPHOCYTES NFR BLD AUTO: 46.3 %
MCH RBC QN AUTO: 26.1 PG (ref 26–34)
MCHC RBC AUTO-ENTMCNC: 31.3 G/DL (ref 31–37)
MCV RBC AUTO: 83.4 FL (ref 80–100)
MIXED CELL %: 6.7 %
NEUTROPHILS # BLD AUTO: 1.8 X10ˆ3/UL (ref 1.5–7.7)
NEUTROPHILS NFR BLD AUTO: 47 %
NITRITE UR QL STRIP: NEGATIVE
PH UR STRIP: 6 [PH]
PLATELET # BLD AUTO: 378 X10ˆ3/UL (ref 150–450)
POTASSIUM BLD-SCNC: 4.3 MMOL/L (ref 3.6–5.1)
PROT UR STRIP-MCNC: 30 MG/DL
RBC # BLD AUTO: 4.64 X10ˆ6/UL
SODIUM BLD-SCNC: 139 MMOL/L (ref 136–145)
SP GR UR STRIP: 1.01
UROBILINOGEN UR STRIP-ACNC: <2 MG/DL
WBC # BLD AUTO: 3.7 X10ˆ3/UL (ref 4–11)

## 2023-08-04 PROCEDURE — 99214 OFFICE O/P EST MOD 30 MIN: CPT | Performed by: NURSE PRACTITIONER

## 2023-08-04 PROCEDURE — 81025 URINE PREGNANCY TEST: CPT | Performed by: NURSE PRACTITIONER

## 2023-08-04 PROCEDURE — 85025 COMPLETE CBC W/AUTO DIFF WBC: CPT | Performed by: NURSE PRACTITIONER

## 2023-08-04 PROCEDURE — 80047 BASIC METABLC PNL IONIZED CA: CPT | Performed by: NURSE PRACTITIONER

## 2023-08-04 PROCEDURE — 81002 URINALYSIS NONAUTO W/O SCOPE: CPT | Performed by: NURSE PRACTITIONER

## 2023-08-04 RX ORDER — NAPROXEN 500 MG/1
500 TABLET ORAL 2 TIMES DAILY PRN
Qty: 12 TABLET | Refills: 0 | Status: SHIPPED | OUTPATIENT
Start: 2023-08-04

## 2023-08-04 NOTE — DISCHARGE INSTRUCTIONS
Workup today looks good. Take naproxen as needed for pain. Warm packs to abdomen.  See gynecology as scheduled

## 2023-08-04 NOTE — ED INITIAL ASSESSMENT (HPI)
Pt c/o lower abdominal/pelvic pain x 1 week, also c/o abnormal menstrual cycle with heavier flow than usual.

## 2023-08-08 ENCOUNTER — OFFICE VISIT (OUTPATIENT)
Dept: OBGYN CLINIC | Facility: CLINIC | Age: 23
End: 2023-08-08

## 2023-08-08 VITALS
WEIGHT: 293 LBS | DIASTOLIC BLOOD PRESSURE: 63 MMHG | HEIGHT: 65 IN | BODY MASS INDEX: 48.82 KG/M2 | SYSTOLIC BLOOD PRESSURE: 113 MMHG

## 2023-08-08 DIAGNOSIS — R10.2 PELVIC PAIN IN FEMALE: Primary | ICD-10-CM

## 2023-08-08 DIAGNOSIS — N92.0 MENORRHAGIA WITH REGULAR CYCLE: ICD-10-CM

## 2023-08-08 PROCEDURE — 99213 OFFICE O/P EST LOW 20 MIN: CPT | Performed by: OBSTETRICS & GYNECOLOGY

## 2023-08-08 PROCEDURE — 3008F BODY MASS INDEX DOCD: CPT | Performed by: OBSTETRICS & GYNECOLOGY

## 2023-08-08 PROCEDURE — 3074F SYST BP LT 130 MM HG: CPT | Performed by: OBSTETRICS & GYNECOLOGY

## 2023-08-08 PROCEDURE — 3078F DIAST BP <80 MM HG: CPT | Performed by: OBSTETRICS & GYNECOLOGY

## 2023-08-08 RX ORDER — CEPHALEXIN 500 MG/1
CAPSULE ORAL
COMMUNITY
Start: 2023-08-04

## 2023-08-08 RX ORDER — MOMETASONE FUROATE AND FORMOTEROL FUMARATE DIHYDRATE 200; 5 UG/1; UG/1
2 AEROSOL RESPIRATORY (INHALATION) 2 TIMES DAILY
COMMUNITY
Start: 2022-12-03

## 2023-08-14 ENCOUNTER — TELEPHONE (OUTPATIENT)
Dept: OBGYN CLINIC | Facility: CLINIC | Age: 23
End: 2023-08-14

## 2023-08-14 ENCOUNTER — APPOINTMENT (OUTPATIENT)
Dept: PHYSICAL THERAPY | Age: 23
End: 2023-08-14
Attending: FAMILY MEDICINE
Payer: MEDICAID

## 2023-08-14 NOTE — TELEPHONE ENCOUNTER
Records in Media. Printed and faxed to [facility] for [facility] to review. Patient verified name and . Patient aware.  VU

## 2023-08-15 ENCOUNTER — OFFICE VISIT (OUTPATIENT)
Dept: FAMILY MEDICINE CLINIC | Facility: CLINIC | Age: 23
End: 2023-08-15

## 2023-08-15 VITALS
DIASTOLIC BLOOD PRESSURE: 82 MMHG | HEART RATE: 86 BPM | HEIGHT: 65 IN | SYSTOLIC BLOOD PRESSURE: 128 MMHG | BODY MASS INDEX: 48.82 KG/M2 | WEIGHT: 293 LBS

## 2023-08-15 DIAGNOSIS — K76.9 LIVER LESION: Primary | ICD-10-CM

## 2023-08-15 PROCEDURE — 3008F BODY MASS INDEX DOCD: CPT | Performed by: NURSE PRACTITIONER

## 2023-08-15 PROCEDURE — 3079F DIAST BP 80-89 MM HG: CPT | Performed by: NURSE PRACTITIONER

## 2023-08-15 PROCEDURE — 99214 OFFICE O/P EST MOD 30 MIN: CPT | Performed by: NURSE PRACTITIONER

## 2023-08-15 PROCEDURE — 3074F SYST BP LT 130 MM HG: CPT | Performed by: NURSE PRACTITIONER

## 2023-08-15 RX ORDER — IBUPROFEN 600 MG/1
600 TABLET ORAL EVERY 6 HOURS PRN
COMMUNITY
Start: 2023-08-04

## 2023-08-16 ENCOUNTER — OFFICE VISIT (OUTPATIENT)
Dept: PHYSICAL THERAPY | Age: 23
End: 2023-08-16
Attending: FAMILY MEDICINE
Payer: MEDICAID

## 2023-08-16 PROCEDURE — 97140 MANUAL THERAPY 1/> REGIONS: CPT

## 2023-08-16 PROCEDURE — 97110 THERAPEUTIC EXERCISES: CPT

## 2023-08-16 NOTE — PROGRESS NOTES
Diagnosis:  S/p right lateral ligament reconstruction      Next MD visit: September 2023  Fall Risk: standard         Precautions: n/a    Date of Surgery: 1/9/2023       Medication Changes since last visit?: No    Subjective:  Patient reports 4/10 generalized ankle pain today. She reports her ankle continues to bother her everyday. She presents with ankle brace today. Objective:    8/16/2023: Ankle ROM (R):  DF: R 10 deg  PF: R 59 deg  INV: R 20 deg  EV: R 20 deg       Date: 8/16/2023  Visit #: 25/27 [facility]) exp. 9/1/2023 Date: 7/25/2023  Visit #: 24/27 [facility]) exp. 9/1/2023 Date: 7/21/2023  Visit #: 23/27 [facility]) exp.  9/1/2023   HEP   - re-issued tubagrip for edema management size C - updated HEP - see pt instructions section    Therapeutic Exercise   - reassessment  - walking on treadmill x 5 minutes level 1.0 mph  - supine R PROM in all directions x 5 minutes  - R forward step ups to 6 inch step 2 x 10  - R lateral step ups to 4 inch step 2 x 10  - standing R/L hip abduction/hip extension with GTB at ankles 2 x 10 ea  X 33 minutes - supine R ankle DF/PF/INV/EV with black Tband 3 x 10 ea  - standing B heel raises 3 x 10  - shuttle machine B knee ext/flx 7 bands 2 x 10  - shuttle machine R/L knee ext/flx 5 bands 2 x 10 ea  - standing R/L gastroc stretch at wall 2 x 30 sec holds ea  - standing B ankle DF at wall 2 x 10  - standing B heel raises 3 x 10  - reassessment  X 35 minutes  - walking on TM 1.2 mph x 10 minutes  - supine R ankle DF/PF/INV/EV with black Tband 2 x 10 ea  - sidelying R ankle inversion with 2 x 10  - standing R/L hip abduction/hip extension with blue Tband 2 x 10 ea  - standing B heel raises 3 x 10  - shuttle machine B knee ext/flx 6 bands 3 x 12     Manual Therapy   - STM R ankle for edema management x 12 minutes - supine ankle PF/DF/INV/EV stretching with distraction x 8 minutes    Therapeutic Activity        Modalities        Neuromuscular re-ed   - R SLS on airex 2 x 30 sec holds  - R/L tandem stance on long airex 3 x 20 sec holds each     Assessment:  Patient displaying improved R ankle ROM with reassessment again this session. Patient still with moderate edema noted around joint and foot has tendency to deviate into excess supination when ambulating bearing more weight onto lateral portion of foot. Continued with walking on treadmill to improve deviations with emphasis on heel/toe pattern and bearing equal weight on all regions of foot.      Plan: continue PT     Charges:  Ex 2 Man 1  Total Timed Treatment: 45 min  Total Treatment Time: 45 min

## 2023-08-30 ENCOUNTER — OFFICE VISIT (OUTPATIENT)
Dept: PHYSICAL THERAPY | Age: 23
End: 2023-08-30
Attending: FAMILY MEDICINE
Payer: MEDICAID

## 2023-08-30 ENCOUNTER — TELEPHONE (OUTPATIENT)
Dept: PHYSICAL THERAPY | Facility: HOSPITAL | Age: 23
End: 2023-08-30

## 2023-08-30 PROCEDURE — 97110 THERAPEUTIC EXERCISES: CPT

## 2023-09-05 ENCOUNTER — HOSPITAL ENCOUNTER (OUTPATIENT)
Age: 23
Discharge: HOME | End: 2023-09-05
Payer: MEDICAID

## 2023-09-05 VITALS
SYSTOLIC BLOOD PRESSURE: 142 MMHG | HEART RATE: 66 BPM | OXYGEN SATURATION: 100 % | RESPIRATION RATE: 18 BRPM | TEMPERATURE: 98 F | DIASTOLIC BLOOD PRESSURE: 58 MMHG

## 2023-09-05 DIAGNOSIS — J30.9 ALLERGIC RHINITIS, UNSPECIFIED SEASONALITY, UNSPECIFIED TRIGGER: Primary | ICD-10-CM

## 2023-09-05 PROCEDURE — 99213 OFFICE O/P EST LOW 20 MIN: CPT

## 2023-09-05 RX ORDER — FLUTICASONE PROPIONATE 50 MCG
2 SPRAY, SUSPENSION (ML) NASAL DAILY
Qty: 16 G | Refills: 0 | Status: SHIPPED | OUTPATIENT
Start: 2023-09-05 | End: 2023-10-05

## 2023-09-05 RX ORDER — CETIRIZINE HYDROCHLORIDE 10 MG/1
10 TABLET ORAL DAILY
Qty: 30 TABLET | Refills: 0 | Status: SHIPPED | OUTPATIENT
Start: 2023-09-05 | End: 2023-10-05

## 2023-09-05 RX ORDER — IBUPROFEN 600 MG/1
600 TABLET ORAL EVERY 8 HOURS PRN
Qty: 30 TABLET | Refills: 0 | Status: SHIPPED | OUTPATIENT
Start: 2023-09-05 | End: 2023-09-12

## 2023-09-05 NOTE — DISCHARGE INSTRUCTIONS
Sent prescriptions for Flonase, Zyrtec and ibuprofen to treat your seasonal allergies. Please use them as directed. If you have persistent pain you can follow-up with ear nose and throat as discussed. Otherwise please make an appointment to follow-up with your primary care provider. If you develop any fever, acutely worsening pain or any other concerning complaints you should go to the emergency department.

## 2023-09-05 NOTE — ED INITIAL ASSESSMENT (HPI)
Pt in [address] [place] [address] for c/o R ear pain on/off for 2 months and feeling like her allergies are acting up. Pt denies denies any cough, no fever.

## 2023-09-06 ENCOUNTER — TELEPHONE (OUTPATIENT)
Dept: OTOLARYNGOLOGY | Facility: CLINIC | Age: 23
End: 2023-09-06

## 2023-09-06 NOTE — OPERATIVE REPORT
[facility]    PATIENT'S NAME: Tico Santos   ATTENDING PHYSICIAN: Tigist Damico DO   OPERATING PHYSICIAN: Caroline Maxwell   PATIENT ACCOUNT#:   [de-identified]    LOCATION:  [address]  MEDICAL RECORD #:   [id number]       YOB: 2000  ADMISSION DATE:       01/09/2023      OPERATION DATE:  01/09/2023    OPERATIVE REPORT      PREOPERATIVE DIAGNOSIS:  1. Right chronic anterior talofibular ligament tear. 2.   Right ankle synovitis. 3.   Right navicular stress fracture with large ganglion cyst.  POSTOPERATIVE DIAGNOSIS:  1. Right ankle chronic anterior talofibular ligament tear. 2.   Right ankle synovitis with tibial exostosis. 3.   Right navicular stress fracture with large ganglion cyst.  4.      Right tibial exostosis  PROCEDURE:     Right ankle secondary Brostrom reconstruction    Right ankle arthroscopy with extensive debridement    Closed reduction and percutaneous fixation of the right navicular. ASSISTANT:  Oksana Epps PA-C, who was essential in aiding in reduction, assisting in repair, in order to facilitate surgery and preserve the cartilage and neurovascular bundle. ANESTHESIA:  General, with regional block. ESTIMATED BLOOD LOSS:  10 mL. IMPLANTS:  1. Artelon FlexBand tissue graft with subsequent anchors. 2.   [employer] calcium phosphate AccuFill. COMPLICATIONS:  None. SPECIMENS:  None. CONDITION:  Stable, to PACU. INDICATIONS:  Patient is a 24-year-old female with a history of painful right ankle. She had failed conservative management including physical therapy, activity moderate, anti-inflammatories, and wished to proceed with surgery.   All risks, benefits and alternatives were explained to the patient including, but not limited to, DVT, infection, wound-healing problems, bone-healing problems, neurovascular damage, risk of continued pain, risk of instability, risk of post-traumatic arthritis, and risk for need for further surgery. The patient understood and wished to proceed with the above procedure. OPERATIVE TECHNIQUE:  The patient was met in the preoperative holding area and marked with an indelible marker. She was brought back to the operative suite, placed on the operating table in supine position. There, anesthesia was administered by department of anesthesia. Once she was adequately sedated, a well-padded right thigh tourniquet was placed, and she was placed in the right lateral decubitus position with all bony prominences well padded. She was prepped and draped in the usual sterile fashion. A time-out was performed. All in the room were in agreement with patient, procedure, site of procedure. Preoperative antibiotics were administered by the department of anesthesia prior to incision. The limb was exsanguinated and tourniquet inflated to 250 mmHg and remained inflated for approximately 60 minutes. RIGHT ANKLE SECONDARY BROSTROM:  We made a lateral incision over the distal fibula and dissected down. We noted the chronic tearing of the lateral ligaments. These were incised, and a K-wire was inserted into the distal fibula. The position was checked under fluoroscopy. Once we liked our position, we drilled and inserted our anchor with our FlexBand graft. A K-wire was then inserted into the talus laterally, and the position was checked under fluoroscopy. Once we liked our position, we drilled and inserted the anterior limb of the ATFL and properly tensioned the ankle while doing so. A second limb was then placed from the fibula into the calcaneus. The peroneal tendons were identified and protected. A K-wire was inserted in the calcaneus. We drilled and inserted the posterior limb. An 0 Vicryl suture was then used in a pants-over-vest fashion to help tighten the remaining lateral ligaments over our repair site.   A Camacho modification was performed, and 0 Vicryl suture was used to secure the extensor retinacular tissue as well. She had excellent stability with anterior drawer and talar tilt. RIGHT ANKLE ARTHROSCOPY WITH EXTENSIVE DEBRIDEMENT:  A spinal needle was inserted into the anteromedial aspect of the ankle and the joint was insufflated with Marcaine. The portal site was made with a nick and spread technique using a 15 blade, followed by a hemostat. In a similar manner, the lateral portal site was obtained. The arthroscopy was inserted, and significant synovitis was noted throughout the joint. This was debrided. We also debrided the syndesmosis and the deltoid ligament. Overall, the deltoid was found to be intact minus some fraying. A large bony hypertrophy of the anterior tibia was noted, and this was excised with a bur. All of the bony debris was removed from the joint. We then checked our stability of our ankle and found it to be improved. There was improved ankle range of motion present as well. RIGHT NAVICULAR CLOSED REDUCTION AND INTERNAL FIXATION:  We made a small incision over our navicular and checked the position under fluoroscopy. Once we liked our position, we inserted our guide pin across the stress fracture site. The position was checked under fluoroscopy. The navicular was found to be well reduced. We then drilled and inserted our calcium phosphate AccuFill. This was injected into the bone and allowed to harden. Once it was hardened, we checked the position under fluoroscopy again, and the fixation was found to be well seated in the bone. Once it was all hard, we irrigated our wounds thoroughly and found the navicular stable and well reduced. We then irrigated our wounds and closed our incisions with 2-0 Vicryl for the subcutaneous tissue and a running 3-0 Monocryl for subcuticular closure. A sterile dressing was placed consisting of silver Adaptic, 4 x 4, ABD, Webril, and a bulky Hendrickson sugar-tong splint.   The tourniquet had been deflated, and the toes pinked up nicely. The patient was awoken from anesthesia and brought to the PACU in stable condition. She will be nonweightbearing to her right lower extremity, receive DVT prophylaxis, and will follow up in the office in 1 week.     Dictated By Reji Ferrell DO  d: 01/09/2023 12:02:05  t: 01/09/2023 50:52:24  Jud Hui [id number]  [facility] [place] [place] Pregnancy And Lactation Text: There is insufficient data to evaluate drug-associated risk for major birth defects, miscarriage, or other adverse maternal or fetal outcomes.  There is a pregnancy registry that monitors pregnancy outcomes in pregnant persons exposed to the medication during pregnancy.  It is unknown if this medication is excreted in breast milk.  Do not breastfeed during treatment and for about 4 weeks after the last dose.

## 2023-09-07 ENCOUNTER — OFFICE VISIT (OUTPATIENT)
Dept: OTOLARYNGOLOGY | Facility: CLINIC | Age: 23
End: 2023-09-07

## 2023-09-07 DIAGNOSIS — H92.01 RIGHT EAR PAIN: ICD-10-CM

## 2023-09-07 DIAGNOSIS — M26.609 TMJ (TEMPOROMANDIBULAR JOINT DISORDER): Primary | ICD-10-CM

## 2023-09-07 PROCEDURE — 92504 EAR MICROSCOPY EXAMINATION: CPT | Performed by: STUDENT IN AN ORGANIZED HEALTH CARE EDUCATION/TRAINING PROGRAM

## 2023-09-07 PROCEDURE — 99243 OFF/OP CNSLTJ NEW/EST LOW 30: CPT | Performed by: STUDENT IN AN ORGANIZED HEALTH CARE EDUCATION/TRAINING PROGRAM

## 2023-09-07 RX ORDER — CYCLOBENZAPRINE HCL 5 MG
5 TABLET ORAL NIGHTLY
Qty: 30 TABLET | Refills: 0 | Status: SHIPPED | OUTPATIENT
Start: 2023-09-07 | End: 2023-10-07

## 2023-09-07 RX ORDER — MELOXICAM 15 MG/1
15 TABLET ORAL NIGHTLY
Qty: 30 TABLET | Refills: 0 | Status: SHIPPED | OUTPATIENT
Start: 2023-09-07 | End: 2023-10-07

## 2023-09-07 NOTE — PROGRESS NOTES
Camila Torres is a 20year old female. Patient presents with: Follow - Up: Pt reports right ear pain. X 2 weeks. Pressure and some fluid in ear. ASSESSMENT AND PLAN:   1. TMJ (temporomandibular joint disorder)      2. Right ear pain  20year-old presents with right ear pain for several weeks. Says that she has been told she has an ear infection. She does have a history of grinding and clenching her teeth. She tries not to do this consciously. She notes her hearing goes in and out occasionally. On exam her otologic exam is normal.  It auto insufflated well. She did have exquisite tenderness of her right temporomandibular joint. Discussed TMJ disorder is likely the cause of her ear pain. Her ear drum responded well to auto insufflation indicating her eustachian tube is functioning well. We will trial a course of NSAIDs and muscle relaxant. Discussed the use and risks of these medications. Also discussed soft diet, and talking to a dentist about a . If not improved could consider PT for TMJ or can follow up at [facility] for tympanograms to formally assess eustachian tubes. Referral from Dr. Jennifer Washington regarding ear pain  The patient indicates understanding of these issues and agrees to the plan.       EXAM:   [facility] 07/08/2023 (Exact Date)     Pertinent exam findings may also be noted above in assessment and plan     System Details   Skin Inspection - Normal.   Constitutional Overall appearance - Normal.   Head/Face Symmetric, TMJ tenderness not present    Eyes EOMI, PERRL   Right ear:  Canal clear, TM intact, no TIARRA   Left ear:  Canal clear, TM intact, no TIARRA   Nose: Septum midline, inferior turbinates not enlarged, nasal valves without collapse    Oral cavity/Oropharynx: No lesions or masses on inspection or palpation, tonsils symmetric    Neck: Soft without LAD, thyroid not enlarged  Voice clear/ no stridor   Other:      Scopes and Procedures:             REVIEW OF SYSTEMS:   GENERAL HEALTH: feels well otherwise  GENERAL : denies fever, chills, sweats, weight loss, weight gain  SKIN: denies any unusual skin lesions or rashes  RESPIRATORY: denies shortness of breath with exertion  NEURO: denies headaches    Current Outpatient Medications   Medication Sig Dispense Refill    cyclobenzaprine 5 MG Oral Tab Take 1 tablet (5 mg total) by mouth nightly. 30 tablet 0    Meloxicam 15 MG Oral Tab Take 1 tablet (15 mg total) by mouth at bedtime. 30 tablet 0    ibuprofen 600 MG Oral Tab Take 1 tablet (600 mg total) by mouth every 8 (eight) hours as needed for Pain or Fever. 30 tablet 0    fluticasone propionate 50 MCG/ACT Nasal Suspension 2 sprays by Nasal route daily. 16 g 0    cetirizine 10 MG Oral Tab Take 1 tablet (10 mg total) by mouth daily. 30 tablet 0    albuterol 108 (90 Base) MCG/ACT Inhalation Aero Soln Inhale 2 puffs into the lungs every 4 (four) hours as needed for Wheezing. 8.5 g 3    ibuprofen 600 MG Oral Tab Take 1 tablet (600 mg total) by mouth every 6 (six) hours as needed for Pain. Mometasone Furo-Formoterol Fum (DULERA) 200-5 MCG/ACT Inhalation Aerosol Inhale 2 puffs into the lungs 2 (two) times daily. cyclobenzaprine 5 MG Oral Tab Take 1 tablet (5 mg total) by mouth 3 (three) times daily as needed for Muscle spasms. 30 tablet 0    albuterol (2.5 MG/3ML) 0.083% Inhalation Nebu Soln Take 3 mL (2.5 mg total) by nebulization every 4 (four) hours as needed for Wheezing or Shortness of Breath.  90 mL 2    Respiratory Therapy Supplies (NEBULIZER/TUBING/MOUTHPIECE) Does not apply Kit Use as needed with nebulizer machine and albuterol solution every 4-6 hours for wheezing or shortness of breath 1 Package 0    Spacer/Aero-Holding Chambers Does not apply Device Use with albuterol inhaler 1 Device 0      Past Medical History:   Diagnosis Date    Asthma     Obesity     Osteoarthritis       Social History:  Social History     Socioeconomic History    Marital status: Single   Tobacco Use Smoking status: Never    Smokeless tobacco: Never   Vaping Use    Vaping Use: Never used   Substance and Sexual Activity    Alcohol use: Yes     Comment: occasional    Drug use: Yes     Comment: occasional eriberto Moya MD  9/7/2023  2:34 PM

## 2023-09-11 ENCOUNTER — HOSPITAL ENCOUNTER (OUTPATIENT)
Age: 23
Discharge: HOME | End: 2023-09-11
Payer: MEDICAID

## 2023-09-11 VITALS
SYSTOLIC BLOOD PRESSURE: 130 MMHG | RESPIRATION RATE: 20 BRPM | HEART RATE: 88 BPM | OXYGEN SATURATION: 100 % | TEMPERATURE: 97 F | DIASTOLIC BLOOD PRESSURE: 59 MMHG

## 2023-09-11 DIAGNOSIS — M26.621 ARTHRALGIA OF RIGHT TEMPOROMANDIBULAR JOINT: Primary | ICD-10-CM

## 2023-09-11 PROCEDURE — 99213 OFFICE O/P EST LOW 20 MIN: CPT

## 2023-09-13 ENCOUNTER — OFFICE VISIT (OUTPATIENT)
Dept: ORTHOPEDICS CLINIC | Facility: CLINIC | Age: 23
End: 2023-09-13

## 2023-09-13 DIAGNOSIS — E66.01 MORBID OBESITY WITH BMI OF 60.0-69.9, ADULT (HCC): ICD-10-CM

## 2023-09-13 DIAGNOSIS — M84.374A STRESS FRACTURE OF NAVICULAR BONE OF RIGHT FOOT: Primary | ICD-10-CM

## 2023-09-13 DIAGNOSIS — M19.079 OSTEOARTHRITIS OF TALONAVICULAR JOINT: ICD-10-CM

## 2023-09-13 PROCEDURE — 99214 OFFICE O/P EST MOD 30 MIN: CPT | Performed by: ORTHOPAEDIC SURGERY

## 2023-09-25 ENCOUNTER — TELEPHONE (OUTPATIENT)
Dept: ORTHOPEDICS CLINIC | Facility: CLINIC | Age: 23
End: 2023-09-25

## 2023-09-25 NOTE — TELEPHONE ENCOUNTER
Pt needs note for work to state that she needs to elevate her ankle - please put into [employer]

## 2023-09-25 NOTE — TELEPHONE ENCOUNTER
Please advise is we could add on to her note on 9/13/23 that she needs to elevate her ankle at work.     Please advise

## 2023-09-26 ENCOUNTER — HOSPITAL ENCOUNTER (OUTPATIENT)
Age: 23
Discharge: HOME | End: 2023-09-26
Payer: MEDICAID

## 2023-09-26 VITALS
DIASTOLIC BLOOD PRESSURE: 76 MMHG | TEMPERATURE: 97 F | SYSTOLIC BLOOD PRESSURE: 135 MMHG | RESPIRATION RATE: 18 BRPM | HEART RATE: 92 BPM | OXYGEN SATURATION: 98 %

## 2023-09-26 DIAGNOSIS — J02.9 VIRAL PHARYNGITIS: ICD-10-CM

## 2023-09-26 DIAGNOSIS — J40 BRONCHITIS WITH WHEEZING: Primary | ICD-10-CM

## 2023-09-26 LAB — S PYO AG THROAT QL: NEGATIVE

## 2023-09-26 PROCEDURE — 99213 OFFICE O/P EST LOW 20 MIN: CPT | Performed by: PHYSICIAN ASSISTANT

## 2023-09-26 PROCEDURE — 87880 STREP A ASSAY W/OPTIC: CPT | Performed by: PHYSICIAN ASSISTANT

## 2023-09-26 RX ORDER — AZITHROMYCIN 250 MG/1
TABLET, FILM COATED ORAL
Qty: 6 TABLET | Refills: 0 | Status: SHIPPED | OUTPATIENT
Start: 2023-09-26 | End: 2023-10-01

## 2023-09-26 RX ORDER — ALBUTEROL SULFATE 2.5 MG/3ML
2.5 SOLUTION RESPIRATORY (INHALATION) EVERY 4 HOURS PRN
Qty: 30 EACH | Refills: 0 | Status: SHIPPED | OUTPATIENT
Start: 2023-09-26 | End: 2023-10-26

## 2023-09-26 RX ORDER — PREDNISONE 20 MG/1
40 TABLET ORAL DAILY
Qty: 10 TABLET | Refills: 0 | Status: SHIPPED | OUTPATIENT
Start: 2023-09-26 | End: 2023-10-01

## 2023-09-27 NOTE — TELEPHONE ENCOUNTER
Note updated and sent to patient through [employer].     [employer] message sent to patient

## 2023-09-27 NOTE — TELEPHONE ENCOUNTER
Eliana Petersen, DO  You16 hours ago (6:46 PM)       Go ahead and put a note in that she should elevate her foot at work while she's sitting.

## 2023-10-03 ENCOUNTER — PATIENT OUTREACH (OUTPATIENT)
Dept: FAMILY MEDICINE CLINIC | Facility: CLINIC | Age: 23
End: 2023-10-03

## 2023-10-18 ENCOUNTER — HOSPITAL ENCOUNTER (OUTPATIENT)
Age: 23
Discharge: HOME | End: 2023-10-18
Payer: MEDICAID

## 2023-10-18 ENCOUNTER — APPOINTMENT (OUTPATIENT)
Dept: GENERAL RADIOLOGY | Age: 23
End: 2023-10-18
Attending: NURSE PRACTITIONER
Payer: MEDICAID

## 2023-10-18 VITALS
OXYGEN SATURATION: 100 % | TEMPERATURE: 97 F | RESPIRATION RATE: 20 BRPM | SYSTOLIC BLOOD PRESSURE: 129 MMHG | HEART RATE: 92 BPM | DIASTOLIC BLOOD PRESSURE: 81 MMHG

## 2023-10-18 DIAGNOSIS — J45.31 MILD PERSISTENT ASTHMA WITH EXACERBATION: Primary | ICD-10-CM

## 2023-10-18 PROCEDURE — 71046 X-RAY EXAM CHEST 2 VIEWS: CPT | Performed by: NURSE PRACTITIONER

## 2023-10-18 RX ORDER — ALBUTEROL SULFATE 2.5 MG/3ML
2.5 SOLUTION RESPIRATORY (INHALATION) EVERY 4 HOURS PRN
Qty: 30 EACH | Refills: 0 | Status: SHIPPED | OUTPATIENT
Start: 2023-10-18 | End: 2023-11-17

## 2023-10-18 RX ORDER — PREDNISONE 20 MG/1
40 TABLET ORAL DAILY
Qty: 10 TABLET | Refills: 0 | Status: SHIPPED | OUTPATIENT
Start: 2023-10-18 | End: 2023-10-23

## 2023-10-18 NOTE — ED INITIAL ASSESSMENT (HPI)
Pt c/o asthma exacerbation, for 1 week. Steroid use last month. States feeling that her inhaler isnt working.

## 2023-10-19 NOTE — DISCHARGE INSTRUCTIONS
Please call your primary care provider to schedule a follow-up appointment. This is your second asthma exacerbation in the last 2 months. You may need to be started on other daily medication to prevent asthma exacerbations. Any worsening symptoms please go to the ER.

## 2023-11-05 ENCOUNTER — HOSPITAL ENCOUNTER (OUTPATIENT)
Age: 23
Discharge: HOME | End: 2023-11-05
Payer: MEDICAID

## 2023-11-05 VITALS
DIASTOLIC BLOOD PRESSURE: 89 MMHG | RESPIRATION RATE: 18 BRPM | HEART RATE: 79 BPM | SYSTOLIC BLOOD PRESSURE: 136 MMHG | OXYGEN SATURATION: 100 % | TEMPERATURE: 97 F

## 2023-11-05 DIAGNOSIS — H66.92 LEFT OTITIS MEDIA, UNSPECIFIED OTITIS MEDIA TYPE: Primary | ICD-10-CM

## 2023-11-05 RX ORDER — CEFDINIR 300 MG/1
300 CAPSULE ORAL 2 TIMES DAILY
Qty: 20 CAPSULE | Refills: 0 | Status: SHIPPED | OUTPATIENT
Start: 2023-11-05 | End: 2023-11-15

## 2023-11-05 RX ORDER — IBUPROFEN 600 MG/1
600 TABLET ORAL EVERY 6 HOURS PRN
Qty: 20 TABLET | Refills: 0 | Status: SHIPPED | OUTPATIENT
Start: 2023-11-05

## 2023-11-05 NOTE — DISCHARGE INSTRUCTIONS
Tylenol or Motrin for pain or fever. Continue taking Flonase. Take the Cefdinir as prescribed. Follow up as needed with your doctor. Return for any concerns.

## 2023-11-20 ENCOUNTER — HOSPITAL ENCOUNTER (OUTPATIENT)
Age: 23
Discharge: HOME | End: 2023-11-20
Payer: MEDICAID

## 2023-11-20 VITALS
SYSTOLIC BLOOD PRESSURE: 135 MMHG | TEMPERATURE: 98 F | DIASTOLIC BLOOD PRESSURE: 82 MMHG | RESPIRATION RATE: 18 BRPM | OXYGEN SATURATION: 99 % | HEART RATE: 81 BPM

## 2023-11-20 DIAGNOSIS — Z20.822 ENCOUNTER FOR LABORATORY TESTING FOR COVID-19 VIRUS: Primary | ICD-10-CM

## 2023-11-20 DIAGNOSIS — J45.901 ASTHMA EXACERBATION, MILD: ICD-10-CM

## 2023-11-20 DIAGNOSIS — J06.9 VIRAL UPPER RESPIRATORY TRACT INFECTION: ICD-10-CM

## 2023-11-20 LAB
S PYO AG THROAT QL: NEGATIVE
SARS-COV-2 RNA RESP QL NAA+PROBE: NOT DETECTED

## 2023-11-20 PROCEDURE — 87880 STREP A ASSAY W/OPTIC: CPT

## 2023-11-20 PROCEDURE — U0002 COVID-19 LAB TEST NON-CDC: HCPCS

## 2023-11-20 PROCEDURE — 99213 OFFICE O/P EST LOW 20 MIN: CPT

## 2023-11-20 RX ORDER — PREDNISONE 20 MG/1
40 TABLET ORAL DAILY
Qty: 10 TABLET | Refills: 0 | Status: SHIPPED | OUTPATIENT
Start: 2023-11-20 | End: 2023-11-25

## 2023-11-20 NOTE — DISCHARGE INSTRUCTIONS
Strep test is negative. COVID test is negative. There are no signs of infection on physical exam.  This is likely a viral illness. I sent a prescription for prednisone to treat your asthma exacerbation. This will also help your throat pain. Continue to use your albuterol as needed, if you need it more than every 4 hours you need to go to the emergency department. Please be sure to drink plenty of fluids, use Tylenol and Motrin for pain or fever. Use Flonase and Mucinex for congestion. If you develop any respiratory complaints, fever that does not improve with medications or any other concerning complaints you should go to the emergency department. Otherwise follow up with your primary care provider.

## 2023-11-21 ENCOUNTER — OFFICE VISIT (OUTPATIENT)
Dept: OBGYN CLINIC | Facility: CLINIC | Age: 23
End: 2023-11-21

## 2023-11-21 VITALS
WEIGHT: 293 LBS | DIASTOLIC BLOOD PRESSURE: 86 MMHG | BODY MASS INDEX: 48.82 KG/M2 | HEIGHT: 65 IN | HEART RATE: 94 BPM | SYSTOLIC BLOOD PRESSURE: 139 MMHG

## 2023-11-21 DIAGNOSIS — R10.2 PELVIC PAIN IN FEMALE: ICD-10-CM

## 2023-11-21 DIAGNOSIS — N92.0 MENORRHAGIA WITH REGULAR CYCLE: Primary | ICD-10-CM

## 2023-11-21 PROCEDURE — 3008F BODY MASS INDEX DOCD: CPT | Performed by: OBSTETRICS & GYNECOLOGY

## 2023-11-21 PROCEDURE — 3075F SYST BP GE 130 - 139MM HG: CPT | Performed by: OBSTETRICS & GYNECOLOGY

## 2023-11-21 PROCEDURE — 99214 OFFICE O/P EST MOD 30 MIN: CPT | Performed by: OBSTETRICS & GYNECOLOGY

## 2023-11-21 PROCEDURE — 3079F DIAST BP 80-89 MM HG: CPT | Performed by: OBSTETRICS & GYNECOLOGY

## 2023-11-24 ENCOUNTER — HOSPITAL ENCOUNTER (OUTPATIENT)
Age: 23
Discharge: HOME | End: 2023-11-24
Attending: EMERGENCY MEDICINE
Payer: MEDICAID

## 2023-11-24 VITALS
TEMPERATURE: 97 F | SYSTOLIC BLOOD PRESSURE: 107 MMHG | HEART RATE: 68 BPM | RESPIRATION RATE: 18 BRPM | OXYGEN SATURATION: 100 % | DIASTOLIC BLOOD PRESSURE: 62 MMHG

## 2023-11-24 DIAGNOSIS — Z20.822 ENCOUNTER FOR LABORATORY TESTING FOR COVID-19 VIRUS: ICD-10-CM

## 2023-11-24 DIAGNOSIS — J06.9 UPPER RESPIRATORY TRACT INFECTION, UNSPECIFIED TYPE: Primary | ICD-10-CM

## 2023-11-24 DIAGNOSIS — J45.901 EXACERBATION OF ASTHMA, UNSPECIFIED ASTHMA SEVERITY, UNSPECIFIED WHETHER PERSISTENT: ICD-10-CM

## 2023-11-24 LAB
POCT INFLUENZA A: NEGATIVE
POCT INFLUENZA B: NEGATIVE
S PYO AG THROAT QL: NEGATIVE
SARS-COV-2 RNA RESP QL NAA+PROBE: NOT DETECTED

## 2023-11-24 PROCEDURE — 87880 STREP A ASSAY W/OPTIC: CPT | Performed by: EMERGENCY MEDICINE

## 2023-11-24 PROCEDURE — 99213 OFFICE O/P EST LOW 20 MIN: CPT | Performed by: EMERGENCY MEDICINE

## 2023-11-24 PROCEDURE — U0002 COVID-19 LAB TEST NON-CDC: HCPCS | Performed by: EMERGENCY MEDICINE

## 2023-11-24 PROCEDURE — 87502 INFLUENZA DNA AMP PROBE: CPT | Performed by: EMERGENCY MEDICINE

## 2023-11-24 RX ORDER — BENZONATATE 100 MG/1
100 CAPSULE ORAL 3 TIMES DAILY PRN
Qty: 30 CAPSULE | Refills: 0 | Status: SHIPPED | OUTPATIENT
Start: 2023-11-24 | End: 2023-12-24

## 2023-11-24 NOTE — ED INITIAL ASSESSMENT (HPI)
Pt complains of sore throat and chest tightness x 1 week.  States finished course of steroids today and remains symptomatic with shortness of breath. + chills

## 2023-12-08 ENCOUNTER — APPOINTMENT (OUTPATIENT)
Dept: GENERAL RADIOLOGY | Age: 23
End: 2023-12-08
Attending: PHYSICIAN ASSISTANT
Payer: MEDICAID

## 2023-12-08 ENCOUNTER — HOSPITAL ENCOUNTER (OUTPATIENT)
Age: 23
Discharge: HOME | End: 2023-12-08
Payer: MEDICAID

## 2023-12-08 VITALS
RESPIRATION RATE: 18 BRPM | SYSTOLIC BLOOD PRESSURE: 140 MMHG | OXYGEN SATURATION: 99 % | HEART RATE: 81 BPM | DIASTOLIC BLOOD PRESSURE: 85 MMHG | TEMPERATURE: 98 F

## 2023-12-08 DIAGNOSIS — M25.512 ACUTE PAIN OF LEFT SHOULDER: Primary | ICD-10-CM

## 2023-12-08 PROCEDURE — 73030 X-RAY EXAM OF SHOULDER: CPT | Performed by: PHYSICIAN ASSISTANT

## 2023-12-08 PROCEDURE — 99212 OFFICE O/P EST SF 10 MIN: CPT | Performed by: PHYSICIAN ASSISTANT

## 2023-12-08 RX ORDER — IBUPROFEN 600 MG/1
TABLET ORAL
Qty: 20 TABLET | Refills: 0 | Status: SHIPPED | OUTPATIENT
Start: 2023-12-08

## 2023-12-08 RX ORDER — IBUPROFEN 600 MG/1
600 TABLET ORAL ONCE
Status: COMPLETED | OUTPATIENT
Start: 2023-12-08 | End: 2023-12-08

## 2023-12-08 RX ORDER — CYCLOBENZAPRINE HCL 10 MG
10 TABLET ORAL 3 TIMES DAILY PRN
Qty: 14 TABLET | Refills: 0 | Status: SHIPPED | OUTPATIENT
Start: 2023-12-08 | End: 2023-12-15

## 2023-12-08 NOTE — ED INITIAL ASSESSMENT (HPI)
Pt report left shoulder pain that extends to elbow x 3 days. Denies injury or trauma. Feels spasms/tingling.

## 2023-12-12 ENCOUNTER — TELEMEDICINE (OUTPATIENT)
Dept: FAMILY MEDICINE CLINIC | Facility: CLINIC | Age: 23
End: 2023-12-12
Payer: MEDICAID

## 2023-12-12 DIAGNOSIS — M25.512 ACUTE PAIN OF LEFT SHOULDER: Primary | ICD-10-CM

## 2023-12-12 PROCEDURE — 99213 OFFICE O/P EST LOW 20 MIN: CPT | Performed by: NURSE PRACTITIONER

## 2023-12-13 ENCOUNTER — OFFICE VISIT (OUTPATIENT)
Dept: ORTHOPEDICS CLINIC | Facility: CLINIC | Age: 23
End: 2023-12-13

## 2023-12-13 DIAGNOSIS — S93.491D TEAR OF TALOFIBULAR LIGAMENT OF RIGHT LOWER EXTREMITY, SUBSEQUENT ENCOUNTER: ICD-10-CM

## 2023-12-13 DIAGNOSIS — M62.461 CONTRACTURE OF MUSCLE OF RIGHT LOWER LEG: ICD-10-CM

## 2023-12-13 DIAGNOSIS — M84.374A STRESS FRACTURE OF NAVICULAR BONE OF RIGHT FOOT: Primary | ICD-10-CM

## 2023-12-13 DIAGNOSIS — M19.079 OSTEOARTHRITIS OF TALONAVICULAR JOINT: ICD-10-CM

## 2023-12-13 DIAGNOSIS — M65.9 SYNOVITIS OF RIGHT ANKLE: ICD-10-CM

## 2023-12-13 PROCEDURE — 99214 OFFICE O/P EST MOD 30 MIN: CPT | Performed by: ORTHOPAEDIC SURGERY

## 2023-12-13 RX ORDER — IBUPROFEN 800 MG/1
800 TABLET ORAL EVERY 6 HOURS PRN
Qty: 60 TABLET | Refills: 1 | Status: SHIPPED | OUTPATIENT
Start: 2023-12-13

## 2023-12-13 RX ORDER — AZITHROMYCIN 250 MG/1
250 TABLET, FILM COATED ORAL DAILY
COMMUNITY

## 2023-12-13 NOTE — PROGRESS NOTES
HPI    Virtual Telephone/Video Check-In    Anne Valencia verbally consents to a Virtual/Telephone Check-In visit on 12/12/23. Patient has been referred to the [facility] website at [email or web address] to review the yearly Consent to Treat document. Patient understands and accepts financial responsibility for any deductible, co-insurance and/or co-pays associated with this service. Duration of the service: 7 minutes      Summary of topics discussed:     Patient presents with concerns of left arm pain that has been present for the past week or so. Pain is present all day. Was seen in the  last week for this pain and was given a muscle relaxer as well as ibuprofen which is not seeming to relieve pain. Xray was completed which was normal.  Feels like a blood pressure cuff is on her arm. Pain is mostly in upper arm from shoulder to elbow. Denies injury prior to start of pain. Denies chest pain, shortness of breath. Review of Systems   Musculoskeletal:         Left shoulder and upper arm pain. There were no vitals filed for this visit. There is no height or weight on file to calculate BMI. Health Maintenance   Topic Date Due    Asthma Action Plan  Never done    Pneumococcal Vaccine: Birth to 64yrs (1 - PCV) Never done    Pap Smear  Never done    DTaP,Tdap,and Td Vaccines (7 - Td or Tdap) 04/29/2020    Annual Physical  04/14/2022    COVID-19 Vaccine (3 - 2023-24 season) 09/01/2023    Influenza Vaccine (1) 10/01/2023    Asthma Control Test  12/12/2023    Annual Depression Screening  Completed    HPV Vaccines  Completed       Patient's last menstrual period was 12/08/2023. Past Medical History:   Diagnosis Date    Asthma     Obesity     Osteoarthritis        .   Past Surgical History:   Procedure Laterality Date    Other surgical history  01/09/2023    Right ankle arthroscopy with synovectomy    Tear duct system surg unlisted  2002       Family History   Problem Relation Age of Onset No Known Problems Father     No Known Problems Mother     Diabetes Other     Cancer Neg     Heart Disorder Neg     Hypertension Neg     Breast Cancer Neg     Ovarian Cancer Neg     Uterine Cancer Neg     Colon Cancer Neg        Social History     Socioeconomic History    Marital status: Single     Spouse name: Not on file    Number of children: Not on file    Years of education: Not on file    Highest education level: Not on file   Occupational History    Not on file   Tobacco Use    Smoking status: Never    Smokeless tobacco: Never   Vaping Use    Vaping Use: Never used   Substance and Sexual Activity    Alcohol use: Not Currently     Comment: occasional    Drug use: Not Currently     Comment: occasional gummies    Sexual activity: Not on file   Other Topics Concern    Not on file   Social History Narrative    Not on file     Social Determinants of Health     Financial Resource Strain: Not on file   Food Insecurity: Not on file   Transportation Needs: Not on file   Physical Activity: Not on file   Stress: Not on file   Social Connections: Not on file   Housing Stability: Not on file       Current Outpatient Medications   Medication Sig Dispense Refill    ibuprofen 600 MG Oral Tab Take 1 tablet (600 mg total) by mouth every 6 hours with food 20 tablet 0    cyclobenzaprine 10 MG Oral Tab Take 1 tablet (10 mg total) by mouth 3 (three) times daily as needed for Muscle spasms. 14 tablet 0    benzonatate 100 MG Oral Cap Take 1 capsule (100 mg total) by mouth 3 (three) times daily as needed for cough. 30 capsule 0    Norelgestromin-Eth Estradiol 150-35 MCG/24HR Transdermal Patch Weekly Place 1 patch onto the skin once a week. Weeks on, one week off 9 patch 3    ibuprofen 600 MG Oral Tab Take 1 tablet (600 mg total) by mouth every 6 (six) hours as needed for Pain.  (Patient not taking: Reported on 11/21/2023) 20 tablet 0    albuterol 108 (90 Base) MCG/ACT Inhalation Aero Soln Inhale 2 puffs into the lungs every 4 (four) hours as needed for Wheezing. 8.5 g 3    ibuprofen 600 MG Oral Tab Take 1 tablet (600 mg total) by mouth every 6 (six) hours as needed for Pain. (Patient not taking: Reported on 11/21/2023)      Mometasone Furo-Formoterol Fum (DULERA) 200-5 MCG/ACT Inhalation Aerosol Inhale 2 puffs into the lungs 2 (two) times daily. (Patient not taking: Reported on 11/21/2023)      cyclobenzaprine 5 MG Oral Tab Take 1 tablet (5 mg total) by mouth 3 (three) times daily as needed for Muscle spasms. (Patient not taking: Reported on 11/21/2023) 30 tablet 0    albuterol (2.5 MG/3ML) 0.083% Inhalation Nebu Soln Take 3 mL (2.5 mg total) by nebulization every 4 (four) hours as needed for Wheezing or Shortness of Breath. 90 mL 2    Respiratory Therapy Supplies (NEBULIZER/TUBING/MOUTHPIECE) Does not apply Kit Use as needed with nebulizer machine and albuterol solution every 4-6 hours for wheezing or shortness of breath (Patient not taking: Reported on 11/21/2023) 1 Package 0    Spacer/Aero-Holding Chambers Does not apply Device Use with albuterol inhaler (Patient not taking: Reported on 11/21/2023) 1 Device 0       Allergies:  No Known Allergies    Physical Exam  Constitutional:       Appearance: Normal appearance. Neurological:      Mental Status: She is alert and oriented to person, place, and time. Assessment and Plan:  Problem List Items Addressed This Visit    None  Visit Diagnoses       Acute pain of left shoulder    -  Primary    Relevant Orders    ORTHOPEDIC - INTERNAL           Referral to ortho for assessment. Discussed plan of care with patient and patient is in agreement. All questions answered. Patient to call with questions or concerns. Encouraged to sign up for My Chart if not already registered.

## 2023-12-14 ENCOUNTER — HOSPITAL ENCOUNTER (OUTPATIENT)
Dept: GENERAL RADIOLOGY | Age: 23
Discharge: HOME | End: 2023-12-14
Attending: ORTHOPAEDIC SURGERY
Payer: OTHER MISCELLANEOUS

## 2023-12-14 ENCOUNTER — TELEPHONE (OUTPATIENT)
Dept: ORTHOPEDICS CLINIC | Facility: CLINIC | Age: 23
End: 2023-12-14

## 2023-12-14 DIAGNOSIS — Z47.89 ORTHOPEDIC AFTERCARE: Primary | ICD-10-CM

## 2023-12-14 DIAGNOSIS — Z47.89 ORTHOPEDIC AFTERCARE: ICD-10-CM

## 2023-12-14 PROCEDURE — 73630 X-RAY EXAM OF FOOT: CPT | Performed by: ORTHOPAEDIC SURGERY

## 2023-12-14 NOTE — TELEPHONE ENCOUNTER
S/w IC at [facility]. Stated that they did not have order for xray right foot WB. I see that there was an order form 9/13/23 that was not completed. [facility] said they can't access that one. I told them I would put in another order for right foot XR weight-bearing. They stated that they had it and had no further questions.    ROXI

## 2024-01-23 ENCOUNTER — OFFICE VISIT (OUTPATIENT)
Dept: FAMILY MEDICINE CLINIC | Facility: CLINIC | Age: 24
End: 2024-01-23

## 2024-01-23 VITALS
HEIGHT: 65 IN | WEIGHT: 293 LBS | HEART RATE: 70 BPM | DIASTOLIC BLOOD PRESSURE: 70 MMHG | BODY MASS INDEX: 48.82 KG/M2 | SYSTOLIC BLOOD PRESSURE: 138 MMHG

## 2024-01-23 DIAGNOSIS — M25.512 CHRONIC LEFT SHOULDER PAIN: Primary | ICD-10-CM

## 2024-01-23 DIAGNOSIS — G89.29 CHRONIC LEFT SHOULDER PAIN: Primary | ICD-10-CM

## 2024-01-23 DIAGNOSIS — M54.2 NECK PAIN: ICD-10-CM

## 2024-01-23 PROCEDURE — 3008F BODY MASS INDEX DOCD: CPT | Performed by: NURSE PRACTITIONER

## 2024-01-23 PROCEDURE — 99214 OFFICE O/P EST MOD 30 MIN: CPT | Performed by: NURSE PRACTITIONER

## 2024-01-23 PROCEDURE — 3075F SYST BP GE 130 - 139MM HG: CPT | Performed by: NURSE PRACTITIONER

## 2024-01-23 PROCEDURE — 3078F DIAST BP <80 MM HG: CPT | Performed by: NURSE PRACTITIONER

## 2024-01-23 RX ORDER — METHOCARBAMOL 500 MG/1
500 TABLET, FILM COATED ORAL 3 TIMES DAILY PRN
Qty: 90 TABLET | Refills: 0 | Status: SHIPPED | OUTPATIENT
Start: 2024-01-23

## 2024-01-23 NOTE — PROGRESS NOTES
Subjective:   Anne Walter is a 23 year old female who presents for Neck Pain and Shoulder Pain (Has )     Left sided neck and shoulder pain since Jan 2023, pain is worse in the morning rated 8/10,pain improves to 7/10 through the day. Pain is described as a sharp, and tight. Pain is in the left neck and radiates into the left shoulder, occasionally going into the head causing a headache that is relieved with ibuprofen. Pain is daily, she has tried ice and jesse and neither work, she takes cyclobenzaprine which does not help with pain but makes her sleepy. Patient indicates pain wakes her from sleep.  History/Other:    Chief Complaint Reviewed and Verified  Nursing Notes Reviewed and   Verified  Tobacco Reviewed  Allergies Reviewed  Medications Reviewed    Problem List Reviewed  Medical History Reviewed  Surgical History   Reviewed  Family History Reviewed         Tobacco:  She has never smoked tobacco.    Current Outpatient Medications   Medication Sig Dispense Refill    methocarbamol 500 MG Oral Tab Take 1 tablet (500 mg total) by mouth 3 (three) times daily as needed. 90 tablet 0    ibuprofen 800 MG Oral Tab Take 1 tablet (800 mg total) by mouth every 6 (six) hours as needed for Pain. 60 tablet 1    albuterol 108 (90 Base) MCG/ACT Inhalation Aero Soln Inhale 2 puffs into the lungs every 4 (four) hours as needed for Wheezing. 8.5 g 3    albuterol (2.5 MG/3ML) 0.083% Inhalation Nebu Soln Take 3 mL (2.5 mg total) by nebulization every 4 (four) hours as needed for Wheezing or Shortness of Breath. 90 mL 2    Norelgestromin-Eth Estradiol 150-35 MCG/24HR Transdermal Patch Weekly Place 1 patch onto the skin once a week. Weeks on, one week off (Patient not taking: Reported on 1/23/2024) 9 patch 3    Mometasone Furo-Formoterol Fum (DULERA) 200-5 MCG/ACT Inhalation Aerosol Inhale 2 puffs into the lungs 2 (two) times daily. (Patient not taking: Reported on 1/23/2024)      Respiratory Therapy Supplies  (NEBULIZER/TUBING/MOUTHPIECE) Does not apply Kit Use as needed with nebulizer machine and albuterol solution every 4-6 hours for wheezing or shortness of breath 1 Package 0    Spacer/Aero-Holding Chambers Does not apply Device Use with albuterol inhaler 1 Device 0         Review of Systems:  Pertinent items are noted in HPI.  Constitutional: negative  Respiratory: negative  Cardiovascular: negative  Musculoskeletal:positive for neck pain and shoulder pain      Objective:   /70   Pulse 70   Ht 5' 5\" (1.651 m)   Wt (!) 413 lb (187.3 kg)   LMP 12/08/2023   BMI 68.73 kg/m²  Estimated body mass index is 68.73 kg/m² as calculated from the following:    Height as of this encounter: 5' 5\" (1.651 m).    Weight as of this encounter: 413 lb (187.3 kg).  General appearance: alert, appears stated age, and cooperative  Neck: no adenopathy, no carotid bruit, no JVD, supple, symmetrical, trachea midline, and thyroid not enlarged, symmetric, no tenderness/mass/nodules  Musculoskeletal: Tightness noted in left neck and shoulder  Back: symmetric, no curvature. ROM normal. No CVA tenderness.  Lungs: clear to auscultation bilaterally  Heart: S1, S2 normal, no murmur, click, rub or gallop, regular rate and rhythm  Extremities: extremities normal, atraumatic, no cyanosis or edema        Assessment & Plan:   1. Chronic left shoulder pain (Primary)  -     PHYSIATRY - INTERNAL  -     PHYSICAL THERAPY - INTERNAL  -     Methocarbamol; Take 1 tablet (500 mg total) by mouth 3 (three) times daily as needed.  Dispense: 90 tablet; Refill: 0  2. Neck pain  -     PHYSIATRY - INTERNAL  -     PHYSICAL THERAPY - INTERNAL  -     Methocarbamol; Take 1 tablet (500 mg total) by mouth 3 (three) times daily as needed.  Dispense: 90 tablet; Refill: 0      Patient is currently taking cyclobenzaprine which she feels does not work, she is open to trying a different muscle relaxer.  She occasionally takes 800 mg ibuprofen which was prescribed for  her.  Educated on use of ice/heat therapy, and massage to loosen   Would like referral to sport medicine      No follow-ups on file.    Diana CALIXTO RN, 1/23/2024, 9:07 AM

## 2024-01-23 NOTE — PROGRESS NOTES
HPI    Patient presents for left sided neck and shoulder pain that has been present for the past year.  Has been taking muscle relaxer as needed which she states just make her sleepy.  Takes ibuprofen as needed.      Review of Systems   Musculoskeletal:         Left sided neck and shoulder pain.         Vitals:    01/23/24 0855   BP: 138/70   Pulse: 70   Weight: (!) 413 lb (187.3 kg)   Height: 5' 5\" (1.651 m)     Body mass index is 68.73 kg/m².    Health Maintenance   Topic Date Due    Asthma Action Plan  Never done    Pneumococcal Vaccine: Birth to 62yrs (1 of 2 - PCV) Never done    Asthma Control Test  04/24/2018    Pap Smear  Never done    DTaP,Tdap,and Td Vaccines (7 - Td or Tdap) 04/29/2020    Annual Physical  04/14/2022    COVID-19 Vaccine (3 - 2023-24 season) 09/01/2023    Influenza Vaccine (1) 10/01/2023    Annual Depression Screening  01/01/2024    HPV Vaccines  Completed       Patient's last menstrual period was 12/08/2023.    Past Medical History:   Diagnosis Date    Asthma     Obesity     Osteoarthritis        .  Past Surgical History:   Procedure Laterality Date    Other surgical history  01/09/2023    Right ankle arthroscopy with synovectomy    Tear duct system surg unlisted  2002       Family History   Problem Relation Age of Onset    No Known Problems Father     No Known Problems Mother     Diabetes Other     Cancer Neg     Heart Disorder Neg     Hypertension Neg     Breast Cancer Neg     Ovarian Cancer Neg     Uterine Cancer Neg     Colon Cancer Neg        Social History     Socioeconomic History    Marital status: Single     Spouse name: Not on file    Number of children: Not on file    Years of education: Not on file    Highest education level: Not on file   Occupational History    Not on file   Tobacco Use    Smoking status: Never    Smokeless tobacco: Never   Vaping Use    Vaping Use: Never used   Substance and Sexual Activity    Alcohol use: Not Currently     Comment: occasional    Drug use:  Not Currently     Comment: occasional gummies    Sexual activity: Not on file   Other Topics Concern    Not on file   Social History Narrative    Not on file     Social Determinants of Health     Financial Resource Strain: Not on file   Food Insecurity: Not on file   Transportation Needs: Not on file   Physical Activity: Not on file   Stress: Not on file   Social Connections: Not on file   Housing Stability: Not on file       Current Outpatient Medications   Medication Sig Dispense Refill    methocarbamol 500 MG Oral Tab Take 1 tablet (500 mg total) by mouth 3 (three) times daily as needed. 90 tablet 0    ibuprofen 800 MG Oral Tab Take 1 tablet (800 mg total) by mouth every 6 (six) hours as needed for Pain. 60 tablet 1    albuterol 108 (90 Base) MCG/ACT Inhalation Aero Soln Inhale 2 puffs into the lungs every 4 (four) hours as needed for Wheezing. 8.5 g 3    albuterol (2.5 MG/3ML) 0.083% Inhalation Nebu Soln Take 3 mL (2.5 mg total) by nebulization every 4 (four) hours as needed for Wheezing or Shortness of Breath. 90 mL 2    Norelgestromin-Eth Estradiol 150-35 MCG/24HR Transdermal Patch Weekly Place 1 patch onto the skin once a week. Weeks on, one week off (Patient not taking: Reported on 1/23/2024) 9 patch 3    Mometasone Furo-Formoterol Fum (DULERA) 200-5 MCG/ACT Inhalation Aerosol Inhale 2 puffs into the lungs 2 (two) times daily. (Patient not taking: Reported on 1/23/2024)      Respiratory Therapy Supplies (NEBULIZER/TUBING/MOUTHPIECE) Does not apply Kit Use as needed with nebulizer machine and albuterol solution every 4-6 hours for wheezing or shortness of breath 1 Package 0    Spacer/Aero-Holding Chambers Does not apply Device Use with albuterol inhaler 1 Device 0       Allergies:  No Known Allergies    Physical Exam  Vitals and nursing note reviewed.   Constitutional:       General: She is not in acute distress.     Appearance: Normal appearance. She is not ill-appearing.   Cardiovascular:      Rate and  Rhythm: Normal rate and regular rhythm.      Heart sounds: Normal heart sounds. No murmur heard.  Pulmonary:      Effort: Pulmonary effort is normal. No respiratory distress.      Breath sounds: Normal breath sounds. No stridor. No wheezing, rhonchi or rales.   Chest:      Chest wall: No tenderness.   Musculoskeletal:      Left shoulder: Tenderness present. Decreased range of motion.   Skin:     General: Skin is warm and dry.   Neurological:      Mental Status: She is alert and oriented to person, place, and time.   Psychiatric:         Mood and Affect: Mood normal.         Behavior: Behavior normal.         Thought Content: Thought content normal.         Judgment: Judgment normal.          Assessment and Plan:  Problem List Items Addressed This Visit    None  Visit Diagnoses       Chronic left shoulder pain    -  Primary    Relevant Medications    methocarbamol 500 MG Oral Tab    Other Relevant Orders    PHYSIATRY - INTERNAL    PHYSICAL THERAPY - INTERNAL    Neck pain        Relevant Medications    methocarbamol 500 MG Oral Tab    Other Relevant Orders    PHYSIATRY - INTERNAL    PHYSICAL THERAPY - INTERNAL           Trial of methocarbamol tid prn.  Continue ibuprofen as needed.  Referral to PT, physiatry for assessment.        Discussed plan of care with patient and patient is in agreement.  All questions answered. Patient to call with questions or concerns.    Encouraged to sign up for My Chart if not already registered.

## 2024-01-24 ENCOUNTER — OFFICE VISIT (OUTPATIENT)
Dept: ORTHOPEDICS CLINIC | Facility: CLINIC | Age: 24
End: 2024-01-24

## 2024-01-24 DIAGNOSIS — S93.491D SPRAIN OF ANTERIOR TALOFIBULAR LIGAMENT OF RIGHT ANKLE, SUBSEQUENT ENCOUNTER: Primary | ICD-10-CM

## 2024-01-24 DIAGNOSIS — M65.9 SYNOVITIS OF RIGHT ANKLE: ICD-10-CM

## 2024-01-24 DIAGNOSIS — M62.461 CONTRACTURE OF MUSCLE OF RIGHT LOWER LEG: ICD-10-CM

## 2024-01-24 DIAGNOSIS — M84.374A STRESS FRACTURE OF NAVICULAR BONE OF RIGHT FOOT: ICD-10-CM

## 2024-01-24 PROCEDURE — 99214 OFFICE O/P EST MOD 30 MIN: CPT | Performed by: ORTHOPAEDIC SURGERY

## 2024-01-24 RX ORDER — DOXYCYCLINE HYCLATE 100 MG/1
100 CAPSULE ORAL 2 TIMES DAILY
COMMUNITY

## 2024-01-24 RX ORDER — FEXOFENADINE HCL AND PSEUDOEPHEDRINE HCI 60; 120 MG/1; MG/1
1 TABLET, EXTENDED RELEASE ORAL 2 TIMES DAILY
COMMUNITY

## 2024-01-24 RX ORDER — CEPHALEXIN 500 MG/1
1 CAPSULE ORAL 3 TIMES DAILY
COMMUNITY

## 2024-01-24 NOTE — PROGRESS NOTES
Chief Complaint   Patient presents with    Follow - Up     Right ankle fracture. Patient rates her pain 4/10 at this time X-rays were done after last visit     HPI  Pt notes improvement in stability in her ankle and pain. She notes pain and stiffness especially when the weather changes. She is back to mostly her normal routine.     Physical Exam  Gen: NAD, alert, answering questions appropriately  HEENT: NCAT, EOMI  Lungs: NL breathing, symmetrical lung expansion  Abd: Soft, ND  Extremities:   Right Ankle with neutral alignment, DF/PF/EHL intact, SILT, cap refill brisk  Negative anterior drawer.  Improved foot tenderness palpation.  Painless ankle range of motion.    Assessment/Plan: 23 year old year old female presenting status post right ankle secondary Broström reconstruction and subchondroplasty of the navicular on 1/9/2023. She will continue her home exercises which were shown to her today. We went through her anti-inflammatories and she will continue as needed for pain.  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 She had significant instability after that injury and difficulty ambulating. This was a necessary surgery that resulted from her fall. She never sought treatment for this ankle before the fall. The surgery significantly improved pain and symptoms. She is now 1 year post-operative and has reached maximal medical improvement at this time. She should follow up if she has worsening pain.    Kamla Orellana, DO  01/24/24

## 2024-01-31 ENCOUNTER — OFFICE VISIT (OUTPATIENT)
Dept: FAMILY MEDICINE CLINIC | Facility: CLINIC | Age: 24
End: 2024-01-31

## 2024-01-31 ENCOUNTER — LAB ENCOUNTER (OUTPATIENT)
Dept: LAB | Age: 24
End: 2024-01-31
Attending: NURSE PRACTITIONER
Payer: MEDICAID

## 2024-01-31 VITALS
HEIGHT: 65 IN | WEIGHT: 293 LBS | BODY MASS INDEX: 48.82 KG/M2 | SYSTOLIC BLOOD PRESSURE: 132 MMHG | DIASTOLIC BLOOD PRESSURE: 66 MMHG | HEART RATE: 88 BPM

## 2024-01-31 DIAGNOSIS — Z00.00 WELL ADULT EXAM: ICD-10-CM

## 2024-01-31 DIAGNOSIS — Z00.00 WELL ADULT EXAM: Primary | ICD-10-CM

## 2024-01-31 LAB
ALBUMIN SERPL-MCNC: 4.5 G/DL (ref 3.2–4.8)
ALBUMIN/GLOB SERPL: 1.1 {RATIO} (ref 1–2)
ALP LIVER SERPL-CCNC: 80 U/L
ALT SERPL-CCNC: 24 U/L
ANION GAP SERPL CALC-SCNC: 8 MMOL/L (ref 0–18)
AST SERPL-CCNC: 26 U/L (ref ?–34)
BASOPHILS # BLD AUTO: 0.01 X10(3) UL (ref 0–0.2)
BASOPHILS NFR BLD AUTO: 0.3 %
BILIRUB SERPL-MCNC: 0.3 MG/DL (ref 0.3–1.2)
BUN BLD-MCNC: 9 MG/DL (ref 9–23)
BUN/CREAT SERPL: 12.3 (ref 10–20)
CALCIUM BLD-MCNC: 9.5 MG/DL (ref 8.7–10.4)
CHLORIDE SERPL-SCNC: 107 MMOL/L (ref 98–112)
CHOLEST SERPL-MCNC: 172 MG/DL (ref ?–200)
CO2 SERPL-SCNC: 25 MMOL/L (ref 21–32)
CREAT BLD-MCNC: 0.73 MG/DL
DEPRECATED RDW RBC AUTO: 41.1 FL (ref 35.1–46.3)
EGFRCR SERPLBLD CKD-EPI 2021: 117 ML/MIN/1.73M2 (ref 60–?)
EOSINOPHIL # BLD AUTO: 0.02 X10(3) UL (ref 0–0.7)
EOSINOPHIL NFR BLD AUTO: 0.5 %
ERYTHROCYTE [DISTWIDTH] IN BLOOD BY AUTOMATED COUNT: 15.1 % (ref 11–15)
FASTING PATIENT LIPID ANSWER: YES
FASTING STATUS PATIENT QL REPORTED: YES
GLOBULIN PLAS-MCNC: 4 G/DL (ref 2.8–4.4)
GLUCOSE BLD-MCNC: 86 MG/DL (ref 70–99)
HCT VFR BLD AUTO: 40.6 %
HDLC SERPL-MCNC: 45 MG/DL (ref 40–59)
HGB BLD-MCNC: 12.8 G/DL
IMM GRANULOCYTES # BLD AUTO: 0 X10(3) UL (ref 0–1)
IMM GRANULOCYTES NFR BLD: 0 %
LDLC SERPL CALC-MCNC: 113 MG/DL (ref ?–100)
LYMPHOCYTES # BLD AUTO: 1.74 X10(3) UL (ref 1–4)
LYMPHOCYTES NFR BLD AUTO: 46.2 %
MCH RBC QN AUTO: 24 PG (ref 26–34)
MCHC RBC AUTO-ENTMCNC: 31.5 G/DL (ref 31–37)
MCV RBC AUTO: 76 FL
MONOCYTES # BLD AUTO: 0.25 X10(3) UL (ref 0.1–1)
MONOCYTES NFR BLD AUTO: 6.6 %
NEUTROPHILS # BLD AUTO: 1.75 X10 (3) UL (ref 1.5–7.7)
NEUTROPHILS # BLD AUTO: 1.75 X10(3) UL (ref 1.5–7.7)
NEUTROPHILS NFR BLD AUTO: 46.4 %
NONHDLC SERPL-MCNC: 127 MG/DL (ref ?–130)
OSMOLALITY SERPL CALC.SUM OF ELEC: 288 MOSM/KG (ref 275–295)
PLATELET # BLD AUTO: 439 10(3)UL (ref 150–450)
POTASSIUM SERPL-SCNC: 4 MMOL/L (ref 3.5–5.1)
PROT SERPL-MCNC: 8.5 G/DL (ref 5.7–8.2)
RBC # BLD AUTO: 5.34 X10(6)UL
SODIUM SERPL-SCNC: 140 MMOL/L (ref 136–145)
TRIGL SERPL-MCNC: 74 MG/DL (ref 30–149)
TSI SER-ACNC: 2.16 MIU/ML (ref 0.55–4.78)
VLDLC SERPL CALC-MCNC: 13 MG/DL (ref 0–30)
WBC # BLD AUTO: 3.8 X10(3) UL (ref 4–11)

## 2024-01-31 PROCEDURE — 3075F SYST BP GE 130 - 139MM HG: CPT | Performed by: NURSE PRACTITIONER

## 2024-01-31 PROCEDURE — 80061 LIPID PANEL: CPT

## 2024-01-31 PROCEDURE — 80053 COMPREHEN METABOLIC PANEL: CPT

## 2024-01-31 PROCEDURE — 36415 COLL VENOUS BLD VENIPUNCTURE: CPT

## 2024-01-31 PROCEDURE — 3008F BODY MASS INDEX DOCD: CPT | Performed by: NURSE PRACTITIONER

## 2024-01-31 PROCEDURE — 84443 ASSAY THYROID STIM HORMONE: CPT

## 2024-01-31 PROCEDURE — 85025 COMPLETE CBC W/AUTO DIFF WBC: CPT

## 2024-01-31 PROCEDURE — 3078F DIAST BP <80 MM HG: CPT | Performed by: NURSE PRACTITIONER

## 2024-01-31 PROCEDURE — 99395 PREV VISIT EST AGE 18-39: CPT | Performed by: NURSE PRACTITIONER

## 2024-01-31 NOTE — PROGRESS NOTES
HPI    Patient presents for annual physical.    Last pap - never has completed.    LMP - 12/2023.  Irregular periods.    Birth control - Was using the patch but stopped.  Diet - diet is fair per patient.  Exercise - does not exercise regularly.    Immunizations - refusing vaccines today.      Review of Systems   Constitutional:  Negative for activity change, appetite change, fatigue and unexpected weight change.   HENT:  Negative for congestion, ear pain, rhinorrhea, sore throat and tinnitus.    Eyes:  Negative for pain, discharge and itching.   Respiratory:  Negative for cough, shortness of breath and wheezing.    Cardiovascular:  Negative for chest pain.   Gastrointestinal:  Negative for abdominal distention, abdominal pain, constipation, diarrhea, nausea and vomiting.   Musculoskeletal:  Negative for gait problem and joint swelling.   Hematological:  Negative for adenopathy. Does not bruise/bleed easily.   Psychiatric/Behavioral:  Negative for behavioral problems and confusion. The patient is not nervous/anxious.    All other systems reviewed and are negative.       Vitals:    01/31/24 1003   BP: 132/66   Pulse: 88   Weight: (!) 412 lb (186.9 kg)   Height: 5' 5\" (1.651 m)     Body mass index is 68.56 kg/m².    Health Maintenance   Topic Date Due    Asthma Action Plan  Never done    Pneumococcal Vaccine: Birth to 62yrs (1 of 2 - PCV) Never done    Asthma Control Test  04/24/2018    Pap Smear  Never done    DTaP,Tdap,and Td Vaccines (7 - Td or Tdap) 04/29/2020    Annual Physical  04/14/2022    COVID-19 Vaccine (3 - 2023-24 season) 09/01/2023    Influenza Vaccine (1) 10/01/2023    Annual Depression Screening  01/01/2024    HPV Vaccines  Completed       Patient's last menstrual period was 12/08/2023.    Past Medical History:   Diagnosis Date    Asthma     Obesity     Osteoarthritis        .  Past Surgical History:   Procedure Laterality Date    Other surgical history  01/09/2023    Right ankle arthroscopy with  synovectomy    Tear duct system surg unlisted  2002       Family History   Problem Relation Age of Onset    No Known Problems Father     No Known Problems Mother     Diabetes Other     Cancer Neg     Heart Disorder Neg     Hypertension Neg     Breast Cancer Neg     Ovarian Cancer Neg     Uterine Cancer Neg     Colon Cancer Neg        Social History     Socioeconomic History    Marital status: Single     Spouse name: Not on file    Number of children: Not on file    Years of education: Not on file    Highest education level: Not on file   Occupational History    Not on file   Tobacco Use    Smoking status: Never    Smokeless tobacco: Never   Vaping Use    Vaping Use: Never used   Substance and Sexual Activity    Alcohol use: Not Currently     Comment: occasional    Drug use: Not Currently     Comment: occasional gummies    Sexual activity: Not on file   Other Topics Concern    Not on file   Social History Narrative    Not on file     Social Determinants of Health     Financial Resource Strain: Not on file   Food Insecurity: Not on file   Transportation Needs: Not on file   Physical Activity: Not on file   Stress: Not on file   Social Connections: Not on file   Housing Stability: Not on file       Current Outpatient Medications   Medication Sig Dispense Refill    fexofenadine-pseudoephedrine ER (ALLEGRA-D ALLERGY & CONGESTION)  MG Oral Tablet 12 Hr Take 1 tablet by mouth 2 (two) times daily.      ibuprofen 800 MG Oral Tab Take 1 tablet (800 mg total) by mouth every 6 (six) hours as needed for Pain. 60 tablet 1    albuterol 108 (90 Base) MCG/ACT Inhalation Aero Soln Inhale 2 puffs into the lungs every 4 (four) hours as needed for Wheezing. 8.5 g 3    Mometasone Furo-Formoterol Fum (DULERA) 200-5 MCG/ACT Inhalation Aerosol Inhale 2 puffs into the lungs 2 (two) times daily.      albuterol (2.5 MG/3ML) 0.083% Inhalation Nebu Soln Take 3 mL (2.5 mg total) by nebulization every 4 (four) hours as needed for Wheezing or  Shortness of Breath. 90 mL 2    Respiratory Therapy Supplies (NEBULIZER/TUBING/MOUTHPIECE) Does not apply Kit Use as needed with nebulizer machine and albuterol solution every 4-6 hours for wheezing or shortness of breath 1 Package 0    Spacer/Aero-Holding Chambers Does not apply Device Use with albuterol inhaler 1 Device 0    methocarbamol 500 MG Oral Tab Take 1 tablet (500 mg total) by mouth 3 (three) times daily as needed. (Patient not taking: Reported on 1/31/2024) 90 tablet 0    Norelgestromin-Eth Estradiol 150-35 MCG/24HR Transdermal Patch Weekly Place 1 patch onto the skin once a week. Weeks on, one week off (Patient not taking: Reported on 1/31/2024) 9 patch 3       Allergies:  No Known Allergies    Physical Exam  Vitals and nursing note reviewed.   Constitutional:       General: She is not in acute distress.     Appearance: Normal appearance. She is well-developed. She is not ill-appearing, toxic-appearing or diaphoretic.   HENT:      Head: Normocephalic and atraumatic.      Right Ear: Hearing, tympanic membrane, ear canal and external ear normal. There is no impacted cerumen.      Left Ear: Hearing, tympanic membrane, ear canal and external ear normal. There is no impacted cerumen.      Nose: Nose normal. No congestion.      Mouth/Throat:      Mouth: Mucous membranes are moist.      Pharynx: Oropharynx is clear. No oropharyngeal exudate or posterior oropharyngeal erythema.   Eyes:      General:         Right eye: No discharge.         Left eye: No discharge.      Conjunctiva/sclera: Conjunctivae normal.   Neck:      Thyroid: No thyromegaly.   Cardiovascular:      Rate and Rhythm: Normal rate and regular rhythm.      Pulses: Normal pulses.      Heart sounds: Normal heart sounds. No murmur heard.  Pulmonary:      Effort: Pulmonary effort is normal. No respiratory distress.      Breath sounds: Normal breath sounds. No stridor. No wheezing, rhonchi or rales.   Chest:      Chest wall: No tenderness.    Abdominal:      General: Abdomen is flat. Bowel sounds are normal. There is no distension.      Palpations: Abdomen is soft. There is no mass.      Tenderness: There is no abdominal tenderness. There is no guarding or rebound.      Hernia: No hernia is present.   Musculoskeletal:         General: Normal range of motion.      Cervical back: Normal range of motion and neck supple.   Lymphadenopathy:      Cervical: No cervical adenopathy.   Skin:     General: Skin is warm and dry.   Neurological:      Mental Status: She is alert and oriented to person, place, and time.   Psychiatric:         Mood and Affect: Mood normal.         Behavior: Behavior normal.         Thought Content: Thought content normal.         Judgment: Judgment normal.          Assessment and Plan:  Problem List Items Addressed This Visit    None  Visit Diagnoses       Well adult exam    -  Primary    Relevant Orders    Lipid Panel    Comp Metabolic Panel (14)    CBC With Differential With Platelet    TSH W Reflex To Free T4           Fasting labs today.       Discussed plan of care with patient and patient is in agreement.  All questions answered. Patient to call with questions or concerns.    Encouraged to sign up for My Chart if not already registered.

## 2024-02-01 ENCOUNTER — OFFICE VISIT (OUTPATIENT)
Dept: PHYSICAL THERAPY | Age: 24
End: 2024-02-01
Attending: NURSE PRACTITIONER
Payer: MEDICAID

## 2024-02-01 ENCOUNTER — HOSPITAL ENCOUNTER (EMERGENCY)
Facility: HOSPITAL | Age: 24
Discharge: HOME | End: 2024-02-01
Attending: STUDENT IN AN ORGANIZED HEALTH CARE EDUCATION/TRAINING PROGRAM
Payer: MEDICAID

## 2024-02-01 ENCOUNTER — APPOINTMENT (OUTPATIENT)
Dept: CT IMAGING | Facility: HOSPITAL | Age: 24
End: 2024-02-01
Attending: STUDENT IN AN ORGANIZED HEALTH CARE EDUCATION/TRAINING PROGRAM
Payer: MEDICAID

## 2024-02-01 ENCOUNTER — HOSPITAL ENCOUNTER (OUTPATIENT)
Age: 24
Discharge: ANOTHER HOSPITAL | End: 2024-02-01
Payer: MEDICAID

## 2024-02-01 VITALS
OXYGEN SATURATION: 100 % | DIASTOLIC BLOOD PRESSURE: 76 MMHG | RESPIRATION RATE: 22 BRPM | HEART RATE: 77 BPM | SYSTOLIC BLOOD PRESSURE: 144 MMHG | TEMPERATURE: 98 F

## 2024-02-01 VITALS
DIASTOLIC BLOOD PRESSURE: 75 MMHG | SYSTOLIC BLOOD PRESSURE: 148 MMHG | OXYGEN SATURATION: 98 % | HEART RATE: 78 BPM | BODY MASS INDEX: 48.82 KG/M2 | RESPIRATION RATE: 16 BRPM | TEMPERATURE: 99 F | HEIGHT: 65 IN | WEIGHT: 293 LBS

## 2024-02-01 DIAGNOSIS — R51.9 ACUTE INTRACTABLE HEADACHE, UNSPECIFIED HEADACHE TYPE: Primary | ICD-10-CM

## 2024-02-01 DIAGNOSIS — M25.512 CHRONIC LEFT SHOULDER PAIN: Primary | ICD-10-CM

## 2024-02-01 DIAGNOSIS — R42 VERTIGO: ICD-10-CM

## 2024-02-01 DIAGNOSIS — G44.209 TENSION HEADACHE: Primary | ICD-10-CM

## 2024-02-01 DIAGNOSIS — G89.29 CHRONIC LEFT SHOULDER PAIN: Primary | ICD-10-CM

## 2024-02-01 DIAGNOSIS — M54.2 NECK PAIN: ICD-10-CM

## 2024-02-01 LAB
#MXD IC: 0.3 X10ˆ3/UL (ref 0.1–1)
ATRIAL RATE: 76 BPM
B-HCG UR QL: NEGATIVE
BUN BLD-MCNC: 9 MG/DL (ref 7–18)
CHLORIDE BLD-SCNC: 105 MMOL/L (ref 98–112)
CO2 BLD-SCNC: 26 MMOL/L (ref 21–32)
CREAT BLD-MCNC: 0.6 MG/DL
EGFRCR SERPLBLD CKD-EPI 2021: 128 ML/MIN/1.73M2 (ref 60–?)
GLUCOSE BLD-MCNC: 95 MG/DL (ref 70–99)
HCT VFR BLD AUTO: 36.7 %
HCT VFR BLD CALC: 38 %
HGB BLD-MCNC: 11.4 G/DL
ISTAT IONIZED CALCIUM FOR CHEM 8: 1.21 MMOL/L (ref 1.12–1.32)
LYMPHOCYTES # BLD AUTO: 2.1 X10ˆ3/UL (ref 1–4)
LYMPHOCYTES NFR BLD AUTO: 45.3 %
MCH RBC QN AUTO: 23.9 PG (ref 26–34)
MCHC RBC AUTO-ENTMCNC: 31.1 G/DL (ref 31–37)
MCV RBC AUTO: 77.1 FL (ref 80–100)
MIXED CELL %: 6.3 %
NEUTROPHILS # BLD AUTO: 2.2 X10ˆ3/UL (ref 1.5–7.7)
NEUTROPHILS NFR BLD AUTO: 48.4 %
P AXIS: 30 DEGREES
P-R INTERVAL: 154 MS
PLATELET # BLD AUTO: 409 X10ˆ3/UL (ref 150–450)
POTASSIUM BLD-SCNC: 4.3 MMOL/L (ref 3.6–5.1)
Q-T INTERVAL: 380 MS
QRS DURATION: 84 MS
QTC CALCULATION (BEZET): 427 MS
R AXIS: 55 DEGREES
RBC # BLD AUTO: 4.76 X10ˆ6/UL
SODIUM BLD-SCNC: 140 MMOL/L (ref 136–145)
T AXIS: 18 DEGREES
VENTRICULAR RATE: 76 BPM
WBC # BLD AUTO: 4.6 X10ˆ3/UL (ref 4–11)

## 2024-02-01 PROCEDURE — 97110 THERAPEUTIC EXERCISES: CPT | Performed by: PHYSICAL THERAPIST

## 2024-02-01 PROCEDURE — 85025 COMPLETE CBC W/AUTO DIFF WBC: CPT | Performed by: PHYSICIAN ASSISTANT

## 2024-02-01 PROCEDURE — 70450 CT HEAD/BRAIN W/O DYE: CPT | Performed by: STUDENT IN AN ORGANIZED HEALTH CARE EDUCATION/TRAINING PROGRAM

## 2024-02-01 PROCEDURE — 80047 BASIC METABLC PNL IONIZED CA: CPT | Performed by: PHYSICIAN ASSISTANT

## 2024-02-01 PROCEDURE — 99284 EMERGENCY DEPT VISIT MOD MDM: CPT

## 2024-02-01 PROCEDURE — 99214 OFFICE O/P EST MOD 30 MIN: CPT | Performed by: PHYSICIAN ASSISTANT

## 2024-02-01 PROCEDURE — 97161 PT EVAL LOW COMPLEX 20 MIN: CPT | Performed by: PHYSICAL THERAPIST

## 2024-02-01 PROCEDURE — 81025 URINE PREGNANCY TEST: CPT

## 2024-02-01 PROCEDURE — 93000 ELECTROCARDIOGRAM COMPLETE: CPT | Performed by: PHYSICIAN ASSISTANT

## 2024-02-01 RX ORDER — MECLIZINE HYDROCHLORIDE 25 MG/1
25 TABLET ORAL 3 TIMES DAILY PRN
Qty: 30 TABLET | Refills: 0 | Status: SHIPPED | OUTPATIENT
Start: 2024-02-01

## 2024-02-01 RX ORDER — MECLIZINE HYDROCHLORIDE 25 MG/1
25 TABLET ORAL ONCE
Status: COMPLETED | OUTPATIENT
Start: 2024-02-01 | End: 2024-02-01

## 2024-02-01 NOTE — ED INITIAL ASSESSMENT (HPI)
Patient arrived to ED from [facility] via EMS, A/O x 4, with complaints of headache and dizziness x1 month.    Patient states that today the headache and dizziness got worse. Dizziness is described as a spinning sensation when trying to stand. EMS obtained EKG that showed NSR, stable vitals. Patient stated ibuprofen has been helping with headaches but they keep returning.

## 2024-02-01 NOTE — ED PROVIDER NOTES
Chief Complaint   Patient presents with    Headache       History obtained from: patient   services not used     HPI:     Anne Walter is a 23 year old female who presents with intermittent headaches x 1 month that acutely worsened today.  Patient states today headache woke her up out of sleep at 6 AM which has not happened previously.  Patient endorses associated dizziness that is worse with position changes and slight photophobia.  Denies history of similar symptoms.  Patient took ibuprofen today which helped with pain but she continues to feel dizzy.  Denies head injury, LOC, amnesia, vision changes, speech changes, numbness, weakness, chest pain, shortness of breath, palpitations, nausea vomiting, abdominal pain, rash, fevers, neck stiffness.    PMH  Past Medical History:   Diagnosis Date    Asthma     Obesity     Osteoarthritis        PFSH    PFSH asessment screens reviewed and agree.  Nurses notes reviewed I agree with documentation.    Family History   Problem Relation Age of Onset    No Known Problems Father     No Known Problems Mother     Diabetes Other     Cancer Neg     Heart Disorder Neg     Hypertension Neg     Breast Cancer Neg     Ovarian Cancer Neg     Uterine Cancer Neg     Colon Cancer Neg      Family history reviewed with patient/caregiver and is not pertinent to presenting problem.  Social History     Socioeconomic History    Marital status: Single     Spouse name: Not on file    Number of children: Not on file    Years of education: Not on file    Highest education level: Not on file   Occupational History    Not on file   Tobacco Use    Smoking status: Never    Smokeless tobacco: Never   Vaping Use    Vaping Use: Never used   Substance and Sexual Activity    Alcohol use: Not Currently     Comment: occasional    Drug use: Not Currently     Comment: occasional gummies    Sexual activity: Not on file   Other Topics Concern    Not on file   Social History Narrative    Not on file      Social Determinants of Health     Financial Resource Strain: Not on file   Food Insecurity: Not on file   Transportation Needs: Not on file   Physical Activity: Not on file   Stress: Not on file   Social Connections: Not on file   Housing Stability: Not on file         ROS:   Positive for stated complaint: Headache, dizziness  All other systems reviewed and negative except as noted above.  Constitutional and Vital Signs Reviewed.    Physical Exam:     Findings:    /76   Pulse 77   Temp 98.1 °F (36.7 °C) (Oral)   Resp 22   LMP 12/08/2023   SpO2 100%   GENERAL: well developed, no acute distress, non-toxic appearing   SKIN: good skin turgor, no obvious rashes  HEAD: normocephalic, atraumatic  EYES: sclera non-icteric bilaterally, conjunctiva clear, PERRLA, EOMs intact  EARS: TMs clear bilaterally, canals clear  NOSE: nasal turbinates: pink, normal mucosa  OROPHARYNX: MMM, pharynx clear, without exudates or swelling, uvula midline, airway patent  NECK: supple, no adenopathy, no nuchal rigidity, no trismus, no edema, phonation normal    CARDIO: RRR, normal heart sounds   LUNGS: clear to auscultation bilaterally, no increased WOB, no rales, rhonchi, or wheezes  EXTREMITIES: no cyanosis or edema, GUERRA without difficulty  NEURO: Cranial nerves II through XII intact, no palmar drift, coordination intact, strength and sensation intact, ambulating with steady gait  PSYCH: alert and oriented x3.  Answering questions appropriately.  Mood appropriate.    MDM/Assessment/Plan:   Orders for this encounter:    Orders Placed This Encounter    POCT CBC     Order Specific Question:   Release to patient     Answer:   Immediate    EKG 12 Lead     Order Specific Question:   Release to patient     Answer:   Immediate    POCT ISTAT chem8 cartridge    iStat (Chem 8)    meclizine (Antivert) tab 25 mg       Labs performed this visit:  Recent Results (from the past 10 hour(s))   POCT CBC    Collection Time: 02/01/24  9:47 AM    Result Value Ref Range    WBC IC 4.6 4.0 - 11.0 x10ˆ3/uL    RBC IC 4.76 3.80 - 5.30 X10ˆ6/uL    HGB IC 11.4 (L) 12.0 - 16.0 g/dL    HCT IC 36.7 35.0 - 48.0 %    MCV IC 77.1 (L) 80.0 - 100.0 fL    MCH IC 23.9 (L) 26.0 - 34.0 pg    MCHC IC 31.1 31.0 - 37.0 g/dL    PLT .0 150.0 - 450.0 X10ˆ3/uL    # Neutrophil 2.2 1.5 - 7.7 X10ˆ3/uL    # Lymphocyte 2.1 1.0 - 4.0 X10ˆ3/uL    # Mixed Cells 0.3 0.1 - 1.0 X10ˆ3/uL    Neutrophil % 48.4 %    Lymphocyte % 45.3 %    Mixed Cell % 6.3 %   EKG 12 Lead    Collection Time: 02/01/24  9:50 AM   Result Value Ref Range    Ventricular rate 76 BPM    Atrial rate 76 BPM    P-R Interval 154 ms    QRS Duration 84 ms    Q-T Interval 380 ms    QTC Calculation (Bezet) 427 ms    P Axis 30 degrees    R Axis 55 degrees    T Axis 18 degrees   POCT ISTAT chem8 cartridge    Collection Time: 02/01/24  9:59 AM   Result Value Ref Range    ISTAT Sodium 140 136 - 145 mmol/L    ISTAT BUN 9 7 - 18 mg/dL    ISTAT Potassium 4.3 3.6 - 5.1 mmol/L    ISTAT Chloride 105 98 - 112 mmol/L    ISTAT Ionized Calcium 1.21 1.12 - 1.32 mmol/L    ISTAT Hematocrit 38 34 - 50 %    ISTAT Glucose 95 70 - 99 mg/dL    ISTAT TCO2 26 21 - 32 mmol/L    ISTAT Creatinine 0.60 0.55 - 1.02 mg/dL    eGFR-Cr 128 >=60 mL/min/1.73m2       Imaging performed this visit:  No orders to display       MDM:  DDx includes migraine versus tension headache versus vertigo versus intracranial process versus other.  Patient is overall very well-appearing with stable vitals.  Patient has intermittent headaches for 1 month however headaches acutely worsened today at 6 AM.  CBC reviewed, grossly unremarkable without evidence of infection or anemia.  BMP reviewed, grossly unremarkable without evidence of electrolyte derangements or kidney dysfunction.  EKG reviewed, normal sinus rhythm, no ST elevations.      Patient given meclizine in IC with some improvement in symptoms however continues to endorse dizziness.  Discussed with patient obtaining  imaging given acutely worsened headache today and persistent symptoms versus close follow up with PCP and/or neurology.  Patient states she wants CT brain at this time however states she does not have a way to get to [facility] for CT. Offered ambulance transport to ER which patient agrees to at this time.  Ambulance arrived to  to transport patient to [place] [facility]. Discussed case with Dr. Carmona who is in agreement with assessment and plan.     Diagnosis:    ICD-10-CM    1. Acute intractable headache, unspecified headache type  R51.9            Leslie Elias PA-C

## 2024-02-01 NOTE — ED INITIAL ASSESSMENT (HPI)
Pt complaining of intermittent ha since jan/2024  No vomiting or nausea  No blurred vision.  No fever.  No uri symptoms.

## 2024-02-01 NOTE — DISCHARGE INSTRUCTIONS
Try to stay well hydrated at home. Please return to the emergency department if you develop  worsening of your headache or a new headache which is severe, associated with vision  changes, associated with neck stiffness or fever, or if it is different from any other headache that  you have had before. Return to the emergency department if you develop numbness,  weakness or tingling or problems with coordination, or if you develop severe nausea and  vomiting and are unable to keep down fluids at home.

## 2024-02-01 NOTE — PROGRESS NOTES
[facility]. EVALUATION:   Referring Physician: Dr. Best  Diagnosis: Chronic left shoulder pain (M25.512,G89.29)  Neck pain (M54.2)    Date of Onset: 1/23/2024 Date of Service: 2/1/2024     PATIENT SUMMARY   Patient verbalized consent for Physical Therapy evaluation and treatment.  Anne Walter is a 23 year old y/o female who presents to therapy today with complaints of L-sided neck and shoulder pain.  Pt describes pain level 7/10.   History of current condition: reports chronic shoulder pain x 1 year. No history of trauma. States pain was becoming worse prompting her to see MD. Now referred to PT for eval and tx.   Current functional limitations include decreased neck and L UE mobility. Difficulty reaching, lifting, and carrying with L UE. Difficulty sleeping.   Anne describes prior level of function full mobility with better tolerance to household activities prior to onset of symptoms. Pt goals include relief from pain and be able to move better.  Past medical history was reviewed with Anne. Patient  has a past medical history of Asthma, Obesity, and Osteoarthritis.    She has no past medical history of Anesthesia complication, Diabetes (HCC), Difficult intubation, Family history of malignant hyperthermia, Family history of pseudocholinesterase deficiency, History of blood transfusion, motion sickness, Malignant hyperthermia, PONV (postoperative nausea and vomiting), Prediabetes, Pseudocholinesterase deficiency, Sleep apnea, or Type 1 diabetes mellitus (HCC). There are no other co-morbidities included at this time.        ASSESSMENT:   Referred to PT for eval and tx due to neck and shoulder pain. Patient presents with the following limitations: decreased neck and L UE mobility. Difficulty reaching, lifting, and carrying with L UE. Difficulty sleeping.  Anne would benefit from skilled Physical Therapy to address the above impairments to decrease pain and improve mobility.    Precautions:  None      OBJECTIVE:   Observation: Alert and oriented x 3. Ambulatory into department.    Palpation: (-) tenderness on L shoulder and neck area    ROM: L shoulder ROM: flexion 0-160, abduction 0-150, external and internal rotation 0-90. R shoulder is WFL into all planes.  Cervical ROM is minimally limited into rotation and lateral flexion. WFL into all planes. Neck pain produced with repeated neck motion into rotation, lateral flexion.    Accessory motion: WFL scapula mobility.    Strength/MMT: L shoulder strength is grossly 4/5 compared to R UE.     Sensation: WNL to light touch throughout B UE's.    Special tests: (-) drop arm, (-) empty can test, (-) shoulder impingement sign    Posture: slouched sitting with rounded shoulders, forward head.    Balance: WFL    Gait: WFL without a device. Does not present with any major gait deviations.    Fall History for the past 2 years: NA    Functional Outcome Measure: QuickDASH Outcome Score  Score: 52.27 % (2/1/2024  8:37 AM)      Today’s Treatment and Response:  Patient education provided on PT eval findings, treatment plan, goals, HEP.  Patient received today:  PT Eval Low Complexity,   Therapeutic Exercises x 25min: Instructed on HEP. Handouts were created and issued to patient.    Charges: PT Eval Low Complexity, Thera Ex2      Total Timed Treatment: 25 min     Total Treatment Time: 45 min         PLAN OF CARE:    Goals: to be met in 6 weeks  Patient will be independent with HEP, its progression, and management of residual symptoms.  Patient will report decreased neck and shoulder pain, no more than 1/10 during activity.  Increase cervical spine ROM to WFL to improve mobility.  Increase shoulder ROM to WFL into all planes to improve UE mobility.  Increase strength to 5/5 throughout to be able to resume previous activities without difficulty.  Patient will verbalize and demonstrate strategies to improve posture and body mechanics during activity.    Frequency / Duration: Patient  will be seen for 1-2 x/week or a total of 10 visits over a 90 day period. Treatment will include: Modalities prn, manual therapy, therapeutic exercises, therapeutic activity, and instructions on a home program.    Education or treatment limitation: None    Rehab Potential:good    Patient/Family/Caregiver was advised of these findings, precautions, and treatment options and has agreed to actively participate in planning and for this course of care.    Thank you for your referral. Please co-sign or sign and return this letter via fax as soon as possible to [phone]. If you have any questions, please contact me at Dept: [phone]    Sincerely,  Electronically signed by therapist: Prateek Kelly, PT    Certification From: 2/1/2024  To:5/1/2024

## 2024-02-02 NOTE — ED PROVIDER NOTES
Patient Seen in: [facility] Emergency Department      History     Chief Complaint   Patient presents with    Dizziness     Stated Complaint: headache and dizziness x1 month    Subjective:   HPI    23-year-old female sent in from urgent care via ambulance for evaluation of headaches.  1 month of intermittent headaches.  Typically wakes up headache free and developed a headache throughout the day.  Headache is holocranial.  Today had a headache that was more severe and woke her up out of sleep for which she went to the urgent care.  She also endorses a room spinning sensation.  No blurry or double vision or focal numbness weakness or tingling.  She received meclizine and was transferred to the ER for consideration of neuroimaging.    Objective:   No pertinent past medical history.            No pertinent past surgical history.              No pertinent social history.            Review of Systems    Positive for stated complaint: headache and dizziness x1 month  Other systems are as noted in HPI.  Constitutional and vital signs reviewed.      All other systems reviewed and negative except as noted above.    Physical Exam     ED Triage Vitals [02/01/24 1141]   /59   Pulse 72   Resp 18   Temp 98.6 °F (37 °C)   Temp src Temporal   SpO2 100 %   O2 Device None (Room air)       Current:/75   Pulse 78   Temp 98.6 °F (37 °C) (Temporal)   Resp 16   Ht 165.1 cm (5' 5\")   Wt 136.1 kg   LMP 12/08/2023 (Exact Date)   SpO2 98%   BMI 49.92 kg/m²         Physical Exam    Constitutional: awake, alert, no sig distress  HENT: mmm, no lesions,  Neck: normal range of motion, no tenderness, supple.  Eyes: PERRL, EOMI, conjunctiva normal, no discharge. Sclera anicteric.  Cardiovascular: rr no murmur  Respiratory: Normal breath sounds, no respiratory distress, no wheezing, no chest tenderness.  GI: Bowel sounds normal, Soft, no tenderness, no masses, no pulsatile masses.  : No CVA tenderness.  Skin: Warm, dry,  no erythema, no rash.  Musculoskeletal: Intact distal pulses, no edema, no tenderness, no cyanosis, no clubbing. Good range of motion in all major joints. No tenderness to palpation or major deformities noted. Back- No tenderness.  Neurologic: Alert & oriented x 3, normal motor function, normal sensory function, no focal deficits noted.  Psych: Calm, cooperative, nl affect        ED Course     Labs Reviewed   POCT PREGNANCY URINE - Normal          ED Course as of 02/02/24 1342  ------------------------------------------------------------  Time: 02/01 1351  Comment: I have independently reviewed patients CT brain and did not appreciate any acute intracranial abnormalities, no evidence for ICH skull fracture or mass lesion.              MDM      23F presenting with headache, intermittent x1 month and room spinning sensation. On arrival VSS, reassuring  GCS 15, nonfocal neurologic exam  Ddx: tension type HA, migrainous HA, ICH, Space-occupying lesion of the brain, IIH  Plan: CT Brain, reassess    Discussed return precautions and follow up instructions with patient who voiced understanding and agreement with the plan. All questions answered.                                Medical Decision Making      Disposition and Plan     Clinical Impression:  1. Tension headache    2. Vertigo         Disposition:  Discharge  2/1/2024  1:52 pm    Follow-up:  Jamal Perez MD  [address] [place] [id number] [phone]    Follow up      [facility] Emergency Department  [address] [facility] [id number] [phone]  Follow up  As needed, If symptoms worsen          Medications Prescribed:  Discharge Medication List as of 2/1/2024  2:04 PM        START taking these medications    Details   meclizine 25 MG Oral Tab Take 1 tablet (25 mg total) by mouth 3 (three) times daily as needed., Normal, Disp-30 tablet, R-0

## 2024-02-08 ENCOUNTER — OFFICE VISIT (OUTPATIENT)
Dept: PHYSICAL MEDICINE AND REHAB | Facility: CLINIC | Age: 24
End: 2024-02-08
Payer: MEDICAID

## 2024-02-08 VITALS
DIASTOLIC BLOOD PRESSURE: 88 MMHG | BODY MASS INDEX: 69 KG/M2 | OXYGEN SATURATION: 98 % | HEART RATE: 76 BPM | SYSTOLIC BLOOD PRESSURE: 140 MMHG | WEIGHT: 293 LBS

## 2024-02-08 DIAGNOSIS — M79.18 CERVICAL MYOFASCIAL PAIN SYNDROME: Primary | ICD-10-CM

## 2024-02-08 PROCEDURE — 99204 OFFICE O/P NEW MOD 45 MIN: CPT | Performed by: PHYSICAL MEDICINE & REHABILITATION

## 2024-02-08 RX ORDER — MELOXICAM 15 MG/1
TABLET ORAL
Qty: 30 TABLET | Refills: 0 | Status: SHIPPED | OUTPATIENT
Start: 2024-02-08

## 2024-02-08 NOTE — H&P
History and Physical    C/C:   Chief Complaint   Patient presents with    New Patient     New R handed patient is here for L shoulder and L side neck pain. Denies injury. Pain began 01/2023. XR 12/08/23. She has her second PT appt tomorrow. No injections. Denies t/n. Pain 6/10. Ibuprofen and robaxin prn.      HPI: 23 year old RH handed female presents with an over 1 year history of left neck and upper trapezial pain. No radiating pain further into the arm, numbness or tingling in the arm or hand. Has had mm. Relaxants without benefit. Some improvement with advil. Has done 2 sessions of PT so far. Works a relatively light duty position at [employer]. Was in the ER recently due to dizziness and lightheadedness; had a CT of the brain done, normal, diagnosed with a tension headache. She does state that her headaches are felt in a band-like pattern around the head. No vision changes.     Allergies: No Known Allergies     Patient-reported ROS  Constitutional  Sleep Disturbance: admits  Chills: denies  Fever: denies  Weight Gain: denies  Weight Loss: denies   Cardiovascular  Chest Pain: denies  Irregular Heartbeat: denies   Respiratory  Painful Breathing: denies  Wheezing: denies   Gastrointestinal  Bowel Incontinence: denies  Heartburn: denies  Abdominal Pain: denies  Blood in Stool : denies  Rectal Pain: denies   Hematology  Easy Bruising: denies  Easy Bleeding: denies   Genitourinary  Difficulty Urinating: denies  Bladder Incontinence: denies  Pelvic Pain: denies  Painful Urination: denies   Musculoskeletal  Joint Stiffness: admits  Painful Joints: admits  Tailbone Pain: denies  Swollen Joints: denies   Peripheral Vascular  Swelling of Legs/Feet: denies  Cold Extremities: denies   Skin  Open Sores: denies  Nodules or Lumps: denies  Rash: denies   Neurological  Loss of Strength Since last Visit: denies  Tingling/Numbness: denies  Balance: denies   Psychiatric  Anxiety: denies  Depressed Mood: denies      Physical  exam:  Pulse 76   Wt (!) 412 lb (186.9 kg)   LMP 12/08/2023 (Exact Date)   SpO2 98%   BMI 68.56 kg/m²     Cervical spine exam:    No neck rash  + ttp cervical paraspinals left. + ttp upper trapezius left  Cervical extension 40 degrees. Cervical flexion 40 degrees. Cervical rotation to the right 75 degrees. Cervical rotation to the left 75 degrees  Spurling's test left negative  5/5 strength in shoulder abduction, elbow flexion, elbow extension, wrist extension, finger flexion, FDI bilaterally  SILT b/l UE C5-T1 distributions  2/4 biceps, brachioradialis, triceps reflexes b/l  Stewart test negative b/l    Imaging: CT brain/head dated 2/4/24:  FINDINGS:  VENTRICLES: No hydrocephalus.    EXTRA-AXIAL: No extraaxial hemorrhage.  No midline shift or herniation.    PARENCHYMA: No CT evidence of acute or subacute infarct.  No mass.  Parenchymal volume is normal.  Gray-white matter differentiation is maintained.    SOFT TISSUES: There are bilateral mildly prominent retroauricular and occipital lymph nodes.  BONES: No acute fracture.  SINUSES: Clear.  ORBITS: No acute abnormality.  MASTOIDS: Clear.  EACS: Clear.                  Impression   CONCLUSION:     No acute intracranial abnormality.     Mildly prominent bilateral retroauricular and occipital lymph nodes of uncertain clinical significance, probably reactive.     Assessment and plan:  Cervical myofascial pain syndrome  Headaches, possibly related to cervical myofascial pain  Dizziness    Recommend meloxicam 15mg qDaily x7 days, then qDaily PRN pain afterwards. Proceed with physical therapy as scheduled. Follow up in 6 weeks if symptoms persist. If headache and dizziness remain may need to consider neurology referral.     Moses Khan DO  Physical Medicine and Rehabilitation  [facility]

## 2024-02-09 ENCOUNTER — OFFICE VISIT (OUTPATIENT)
Dept: PHYSICAL THERAPY | Age: 24
End: 2024-02-09
Attending: PHYSICAL THERAPIST
Payer: MEDICAID

## 2024-02-09 PROCEDURE — 97110 THERAPEUTIC EXERCISES: CPT | Performed by: PHYSICAL THERAPIST

## 2024-02-09 PROCEDURE — 97140 MANUAL THERAPY 1/> REGIONS: CPT | Performed by: PHYSICAL THERAPIST

## 2024-02-09 NOTE — PROGRESS NOTES
Diagnosis: Chronic left shoulder pain (M25.512,G89.29)  Neck pain (M54.2)          Next MD visit: none scheduled  Fall Risk: standard         Precautions: n/a          Medication Changes since last visit?: No      Subjective: Patient complains of shoulder pain. States she has been able to do the exercises at home.  Pt describes pain level 7/10.     Objective:   L shoulder ROM: flexion 0-160, abduction 0-150, external and internal rotation 0-90. R shoulder is WFL into all planes.  Cervical ROM is minimally limited into rotation and lateral flexion. WFL into all planes.    Assessment: Demonstrates improved mobility after therapeutic exercises. Patient is independent with her home exercises. Patient and PT goals are in progress.      Plan: Continue PT to improve mobility.     2/9/2024  Visit#: 2/10 [facility]   Thera Ex   X40min  - B shoulder extension AROM 10 x 2  - B shoulder external rotation AROM 10 x 2  - supine overhead flexion with jovan 10 x 2  - supine forward press with 2lb db's 10 x 2  - supine overhead flexion, horizontal abduction with 1lb wt 10 x 2  - manual PT  - seated neck retraction 10x  - green sportcord shoulder rows, extensions 10x  2     Manual PT   x8min  - cervical spine mobilization into rotation, lateral flexion in supine     Neuromuscular Re-education      Modalities                      Charges: EX3, Manual PT1       Total Timed Treatment: 48 min  Total Treatment Time: 48 min

## 2024-02-12 ENCOUNTER — TELEPHONE (OUTPATIENT)
Dept: ORTHOPEDICS CLINIC | Facility: CLINIC | Age: 24
End: 2024-02-12

## 2024-02-12 NOTE — TELEPHONE ENCOUNTER
Pt last seen on 1/24/24 and per your note she was at [employer]  Last note given on 9/27/23 states-  \"may return to work with the following restrictions: No lifting/pushing/pulling over 20 pounds. She must also elevate her right foot/ankle while sitting. We will maintain these restrictions for the next 6 months. \"    Please advise on restrictions?

## 2024-02-12 NOTE — TELEPHONE ENCOUNTER
Patient states that she wants to work more hours, but the employer is requesting to get a work note lifting some of the restrictions so that the patient is able to lift more. Please send note to [employer].

## 2024-02-13 ENCOUNTER — APPOINTMENT (OUTPATIENT)
Dept: PHYSICAL THERAPY | Age: 24
End: 2024-02-13
Attending: PHYSICAL THERAPIST
Payer: MEDICAID

## 2024-02-13 NOTE — TELEPHONE ENCOUNTER
Called pt to gather further information about what she thinks she can do at work. She changed her mind and would like to keep her current note with restrictions active as it will end 3/27 and she can consider changes at that time. She will call back as needed.

## 2024-02-13 NOTE — TELEPHONE ENCOUNTER
Kamla Orellana, DO  You; [facility] Clinical Staff13 hours ago (8:38 PM)       She was the one who told me this was all she can do. What does she think she can do now? I can say she can return to work without restrictions.

## 2024-02-21 ENCOUNTER — APPOINTMENT (OUTPATIENT)
Dept: PHYSICAL THERAPY | Age: 24
End: 2024-02-21
Attending: NURSE PRACTITIONER
Payer: MEDICAID

## 2024-02-28 ENCOUNTER — APPOINTMENT (OUTPATIENT)
Dept: PHYSICAL THERAPY | Age: 24
End: 2024-02-28
Attending: NURSE PRACTITIONER
Payer: MEDICAID

## 2024-02-29 ENCOUNTER — TELEPHONE (OUTPATIENT)
Dept: ORTHOPEDICS CLINIC | Facility: CLINIC | Age: 24
End: 2024-02-29

## 2024-02-29 NOTE — TELEPHONE ENCOUNTER
Per pt would like note stating after 3/27 she can work without any restrictions, asking if it can be put on [employer] and be sent to workers comp, states office should have info. Please advise thank you.

## 2024-03-02 ENCOUNTER — HOSPITAL ENCOUNTER (OUTPATIENT)
Age: 24
Discharge: HOME | End: 2024-03-02
Payer: MEDICAID

## 2024-03-02 ENCOUNTER — APPOINTMENT (OUTPATIENT)
Dept: GENERAL RADIOLOGY | Age: 24
End: 2024-03-02
Attending: NURSE PRACTITIONER
Payer: MEDICAID

## 2024-03-02 VITALS
RESPIRATION RATE: 18 BRPM | DIASTOLIC BLOOD PRESSURE: 83 MMHG | HEART RATE: 93 BPM | TEMPERATURE: 97 F | SYSTOLIC BLOOD PRESSURE: 128 MMHG | OXYGEN SATURATION: 97 %

## 2024-03-02 DIAGNOSIS — Z20.822 LAB TEST NEGATIVE FOR COVID-19 VIRUS: ICD-10-CM

## 2024-03-02 DIAGNOSIS — R07.9 CHEST PAIN OF UNCERTAIN ETIOLOGY: Primary | ICD-10-CM

## 2024-03-02 DIAGNOSIS — R06.02 SHORTNESS OF BREATH: ICD-10-CM

## 2024-03-02 LAB
#MXD IC: 0.3 X10ˆ3/UL (ref 0.1–1)
B-HCG UR QL: NEGATIVE
BUN BLD-MCNC: 9 MG/DL (ref 7–18)
CHLORIDE BLD-SCNC: 105 MMOL/L (ref 98–112)
CO2 BLD-SCNC: 25 MMOL/L (ref 21–32)
CREAT BLD-MCNC: 0.6 MG/DL
DDIMER WHOLE BLOOD: 295 NG/ML DDU (ref ?–400)
EGFRCR SERPLBLD CKD-EPI 2021: 128 ML/MIN/1.73M2 (ref 60–?)
GLUCOSE BLD-MCNC: 89 MG/DL (ref 70–99)
HCT VFR BLD AUTO: 37.4 %
HCT VFR BLD CALC: 41 %
HGB BLD-MCNC: 11.6 G/DL
ISTAT IONIZED CALCIUM FOR CHEM 8: 1.16 MMOL/L (ref 1.12–1.32)
LYMPHOCYTES # BLD AUTO: 2.4 X10ˆ3/UL (ref 1–4)
LYMPHOCYTES NFR BLD AUTO: 44.4 %
MCH RBC QN AUTO: 23.7 PG (ref 26–34)
MCHC RBC AUTO-ENTMCNC: 31 G/DL (ref 31–37)
MCV RBC AUTO: 76.3 FL (ref 80–100)
MIXED CELL %: 4.9 %
NEUTROPHILS # BLD AUTO: 2.6 X10ˆ3/UL (ref 1.5–7.7)
NEUTROPHILS NFR BLD AUTO: 50.7 %
PLATELET # BLD AUTO: 423 X10ˆ3/UL (ref 150–450)
POCT INFLUENZA A: NEGATIVE
POCT INFLUENZA B: NEGATIVE
POTASSIUM BLD-SCNC: 4.1 MMOL/L (ref 3.6–5.1)
RBC # BLD AUTO: 4.9 X10ˆ6/UL
SARS-COV-2 RNA RESP QL NAA+PROBE: NOT DETECTED
SODIUM BLD-SCNC: 140 MMOL/L (ref 136–145)
TROPONIN I BLD-MCNC: <0.02 NG/ML
WBC # BLD AUTO: 5.3 X10ˆ3/UL (ref 4–11)

## 2024-03-02 PROCEDURE — 81025 URINE PREGNANCY TEST: CPT | Performed by: NURSE PRACTITIONER

## 2024-03-02 PROCEDURE — 71046 X-RAY EXAM CHEST 2 VIEWS: CPT | Performed by: NURSE PRACTITIONER

## 2024-03-02 PROCEDURE — 84484 ASSAY OF TROPONIN QUANT: CPT | Performed by: NURSE PRACTITIONER

## 2024-03-02 PROCEDURE — 93000 ELECTROCARDIOGRAM COMPLETE: CPT | Performed by: NURSE PRACTITIONER

## 2024-03-02 PROCEDURE — 87502 INFLUENZA DNA AMP PROBE: CPT | Performed by: NURSE PRACTITIONER

## 2024-03-02 PROCEDURE — 99214 OFFICE O/P EST MOD 30 MIN: CPT | Performed by: NURSE PRACTITIONER

## 2024-03-02 PROCEDURE — 85025 COMPLETE CBC W/AUTO DIFF WBC: CPT | Performed by: NURSE PRACTITIONER

## 2024-03-02 PROCEDURE — 94640 AIRWAY INHALATION TREATMENT: CPT | Performed by: EMERGENCY MEDICINE

## 2024-03-02 PROCEDURE — U0002 COVID-19 LAB TEST NON-CDC: HCPCS | Performed by: NURSE PRACTITIONER

## 2024-03-02 PROCEDURE — 80047 BASIC METABLC PNL IONIZED CA: CPT | Performed by: NURSE PRACTITIONER

## 2024-03-02 RX ORDER — ALBUTEROL SULFATE 90 UG/1
2 AEROSOL, METERED RESPIRATORY (INHALATION) EVERY 4 HOURS PRN
Qty: 1 EACH | Refills: 0 | Status: SHIPPED | OUTPATIENT
Start: 2024-03-02 | End: 2024-03-02

## 2024-03-02 RX ORDER — PREDNISONE 20 MG/1
40 TABLET ORAL DAILY
Qty: 6 TABLET | Refills: 0 | Status: SHIPPED | OUTPATIENT
Start: 2024-03-02 | End: 2024-03-05

## 2024-03-02 RX ORDER — ALBUTEROL SULFATE 90 UG/1
2 AEROSOL, METERED RESPIRATORY (INHALATION) EVERY 4 HOURS PRN
Qty: 1 EACH | Refills: 0 | Status: SHIPPED | OUTPATIENT
Start: 2024-03-02 | End: 2024-04-01

## 2024-03-02 RX ORDER — ALBUTEROL SULFATE 2.5 MG/3ML
2.5 SOLUTION RESPIRATORY (INHALATION) EVERY 4 HOURS PRN
Qty: 30 EACH | Refills: 0 | Status: SHIPPED | OUTPATIENT
Start: 2024-03-02 | End: 2024-03-08

## 2024-03-02 RX ORDER — IPRATROPIUM BROMIDE AND ALBUTEROL SULFATE 2.5; .5 MG/3ML; MG/3ML
3 SOLUTION RESPIRATORY (INHALATION) ONCE
Status: COMPLETED | OUTPATIENT
Start: 2024-03-02 | End: 2024-03-02

## 2024-03-02 RX ORDER — ALBUTEROL SULFATE 2.5 MG/3ML
2.5 SOLUTION RESPIRATORY (INHALATION) EVERY 4 HOURS PRN
Qty: 30 EACH | Refills: 0 | Status: SHIPPED | OUTPATIENT
Start: 2024-03-02 | End: 2024-03-02

## 2024-03-02 RX ORDER — PREDNISONE 20 MG/1
40 TABLET ORAL DAILY
Qty: 6 TABLET | Refills: 0 | Status: SHIPPED | OUTPATIENT
Start: 2024-03-02 | End: 2024-03-02

## 2024-03-02 NOTE — ED PROVIDER NOTES
Patient Seen in: [facility]      History     Chief Complaint   Patient presents with    Chest Pain     Stated Complaint: sob, headaches    Subjective:   HPI    This is a 23-year-old female with history of asthma osteoarthritis and obesity presenting with chest tightness shortness of breath and a headache.  Patient states since 2/29/2024 she has been having upper chest tightness thinks it might be related to her asthma she feels like she is getting some postnasal drip and occasionally has a cough but also feels a little short of breath and is having a headache.  Denies any runny nose sneezing sore throat fever nausea vomiting or diarrhea.    Objective:   Past Medical History:   Diagnosis Date    Asthma (HCC)     Obesity     Osteoarthritis               Past Surgical History:   Procedure Laterality Date    OTHER SURGICAL HISTORY  01/09/2023    Right ankle arthroscopy with synovectomy    TEAR DUCT SYSTEM SURG UNLISTED  2002                Social History     Socioeconomic History    Marital status: Single   Tobacco Use    Smoking status: Never    Smokeless tobacco: Never   Vaping Use    Vaping Use: Never used   Substance and Sexual Activity    Alcohol use: Not Currently     Comment: occasional    Drug use: Not Currently     Comment: occasional gummies              Review of Systems    Positive for stated complaint: sob, headaches  Other systems are as noted in HPI.  Constitutional and vital signs reviewed.      All other systems reviewed and negative except as noted above.    Physical Exam     ED Triage Vitals [03/02/24 0927]   /83   Pulse 93   Resp 14   Temp 97.2 °F (36.2 °C)   Temp src Temporal   SpO2 97 %   O2 Device None (Room air)       Current:/83   Pulse 93   Temp 97.2 °F (36.2 °C) (Temporal)   Resp 18   LMP  (Within Months)   SpO2 97%         Physical Exam  Vitals and nursing note reviewed.   Constitutional:       Appearance: Normal appearance.   HENT:      Right Ear: Tympanic  membrane normal.      Left Ear: Tympanic membrane normal.      Nose: Nose normal. No congestion or rhinorrhea.      Mouth/Throat:      Mouth: Mucous membranes are moist.      Pharynx: Oropharynx is clear. No posterior oropharyngeal erythema.   Eyes:      Conjunctiva/sclera: Conjunctivae normal.   Cardiovascular:      Rate and Rhythm: Normal rate.   Pulmonary:      Effort: Pulmonary effort is normal. No respiratory distress.      Breath sounds: Normal breath sounds. No wheezing.      Comments: No cough during exam  Musculoskeletal:         General: Normal range of motion.      Cervical back: Normal range of motion. No rigidity or tenderness.   Lymphadenopathy:      Cervical: No cervical adenopathy.   Neurological:      Mental Status: She is alert and oriented to person, place, and time.               ED Course     Labs Reviewed   POCT CBC - Abnormal; Notable for the following components:       Result Value    HGB IC 11.6 (*)     MCV IC 76.3 (*)     MCH IC 23.7 (*)     All other components within normal limits   D-DIMER (POC) - Normal   POCT ISTAT CHEM8 CARTRIDGE - Normal   POCT PREGNANCY URINE - Normal   ISTAT TROPONIN - Normal   POCT FLU TEST - Normal    Narrative:     This assay is a rapid molecular in vitro test utilizing nucleic acid amplification of influenza A and B viral RNA.   RAPID SARS-COV-2 BY PCR - Normal     EKG    Rate, intervals and axes as noted on EKG Report.  Rate: 69  Rhythm: Sinus Rhythm  Reading: Normal sinus rhythm no STEMI no ectopy  EKG reviewed and discussed with my attending Dr. Gupta              Heart Score:    HEART Score      Title      Criteria Score   Age: <45 Age Score: 0   History: Slightly Suspicious Hx Score: 0     EKG: Normal EKG Score: 0   HTN: No   Hypercholesterolemia: No   Atherosclerosis/PVD: No     DM: No   BMI>30kg/m2: Yes   Smoking: No   Family History: No         Other Risk Factor Score: 1               Lab Results   Component Value Date    ITROP <0.02 03/02/2024          HEART Score: 1        Risk of adverse cardiac event is 0.9-1.7%            XR CHEST PA + LAT CHEST (CPT=71046)    Result Date: 3/2/2024  PROCEDURE: XR CHEST PA + LAT CHEST (CPT=71046)  COMPARISON: [facility], XR CHEST PA + LAT CHEST (CPT=71046), 10/18/2023, 6:40 PM.  INDICATIONS: Chest pain, shortness of breath and headache for 3 days. History of asthma.  TECHNIQUE:   Two views.   Findings and impression:  Normal heart size, clear lungs, normal pleura     Dictated by (CST): Von Garrido MD on 3/02/2024 at 10:11 AM     Finalized by (CST): Von Garrido MD on 3/02/2024 at 10:11 AM                    [facility]             Medical Decision Making  23-year-old female not hypoxic no respiratory distress with chest tightness for 2 days and shortness of breath concerned about asthma.  No coughing during exam no wheezing during exam.  Patient was on birth control but not sure when she stopped the birth control so there is concern for possible PE versus ACS versus anxiety versus acute bronchospasm versus influenza versus COVID.  Discussed the plan of care with patient UCG EKG chest x-ray basic labs D-dimer troponin flu and COVID will be collected.  If workup negative will give a breathing treatment to see if any improvement in symptoms and anticipate discharge home to follow-up with PCP and will give a resource for cardiology to follow-up with with strict ER precautions.  Patient feels comfortable with this plan of care and acknowledges understanding instructions.    EKG as documented above  Troponin negative CBC no significant findings Chem-8 essentially unremarkable UCG negative  Chest x-ray independently viewed by this provider no pneumonia  Influenza and COVID-negative  D-dimer negative  Discussed all results with the patient.  Discussed symptoms may be related to her asthma she will receive a DuoNeb [facility] in the [facility].  She will be discharged home with albuterol inhaler nebulizer solution as she does have  a history of asthma and prednisone to only start if she hears herself wheezing.  Discussed over-the-counter Zyrtec.  Discussed outpatient follow-up.  All education and instructions recommendations placed in discharge paperwork.  Patient feels comfortable with the plan of care and acknowledged understanding instructions        Problems Addressed:  Chest pain of uncertain etiology: acute illness or injury  Shortness of breath: acute illness or injury    Amount and/or Complexity of Data Reviewed  Labs:  Decision-making details documented in ED Course.  Radiology:  Decision-making details documented in ED Course.  ECG/medicine tests:  Decision-making details documented in ED Course.  Discussion of management or test interpretation with external provider(s): This patient was discussed with my attending Dr. Gupta who agrees with this provider's management and plan of care.    Risk  OTC drugs.        Disposition and Plan     Clinical Impression:  1. Chest pain of uncertain etiology    2. Shortness of breath    3. Lab test negative for COVID-19 virus         Disposition:  Discharge  3/2/2024 11:12 am    Follow-up:  Jamal Perez MD  [address] [facility] [address]  [phone]    Call       Clifton Simmons DO  [address] [place] [id number]  [phone]      Resource for cardiologist to follow up with          Medications Prescribed:  Current Discharge Medication List        START taking these medications    Details   !! albuterol (2.5 MG/3ML) 0.083% Inhalation Nebu Soln Take 3 mL (2.5 mg total) by nebulization every 4 (four) hours as needed for Wheezing or Shortness of Breath.  Qty: 30 each, Refills: 0      !! albuterol 108 (90 Base) MCG/ACT Inhalation Aero Soln Inhale 2 puffs into the lungs every 4 (four) hours as needed for Wheezing.  Qty: 1 each, Refills: 0      predniSONE 20 MG Oral Tab Take 2 tablets (40 mg total) by mouth daily for 3 days.  Qty: 6 tablet, Refills: 0       !! - Potential  duplicate medications found. Please discuss with provider.

## 2024-03-02 NOTE — DISCHARGE INSTRUCTIONS
Give yourself neb treatments at home as needed use the rescue inhaler as needed recommend over-the-counter Zyrtec as symptoms could be related to change in weather or your asthma.  Start the prednisone only if you hear yourself wheezing take it early in the morning as a side effect of this medication as it can cause you to stay awake.  Follow-up with your primary care provider or anyone in the office that can see you in the next 2 to 3 days call to set up an appointment with the cardiologist for further evaluation of chest pain.  If you develop any new or worsening symptoms worsening chest pain or something changes with the pain shortness of breath nausea vomiting or diarrhea or any new or worsening symptoms go to the nearest emergency department.

## 2024-03-03 LAB
ATRIAL RATE: 69 BPM
P AXIS: 31 DEGREES
P-R INTERVAL: 164 MS
Q-T INTERVAL: 402 MS
QRS DURATION: 82 MS
QTC CALCULATION (BEZET): 430 MS
R AXIS: 49 DEGREES
T AXIS: 14 DEGREES
VENTRICULAR RATE: 69 BPM

## 2024-03-04 NOTE — TELEPHONE ENCOUNTER
Kamla Orellana, DO  You; [facility] Clinical Staff3 days ago       Yes she may return without restrictions

## 2024-03-06 ENCOUNTER — APPOINTMENT (OUTPATIENT)
Dept: GENERAL RADIOLOGY | Age: 24
End: 2024-03-06
Attending: EMERGENCY MEDICINE
Payer: MEDICAID

## 2024-03-06 ENCOUNTER — HOSPITAL ENCOUNTER (OUTPATIENT)
Age: 24
Discharge: HOME | End: 2024-03-06
Attending: EMERGENCY MEDICINE
Payer: MEDICAID

## 2024-03-06 ENCOUNTER — APPOINTMENT (OUTPATIENT)
Dept: PHYSICAL THERAPY | Age: 24
End: 2024-03-06
Attending: NURSE PRACTITIONER
Payer: MEDICAID

## 2024-03-06 VITALS
OXYGEN SATURATION: 98 % | RESPIRATION RATE: 20 BRPM | SYSTOLIC BLOOD PRESSURE: 126 MMHG | TEMPERATURE: 98 F | DIASTOLIC BLOOD PRESSURE: 99 MMHG | HEART RATE: 94 BPM

## 2024-03-06 DIAGNOSIS — J45.21 MILD INTERMITTENT ASTHMA WITH EXACERBATION (HCC): ICD-10-CM

## 2024-03-06 DIAGNOSIS — J06.9 VIRAL URI WITH COUGH: Primary | ICD-10-CM

## 2024-03-06 LAB
POCT INFLUENZA A: NEGATIVE
POCT INFLUENZA B: NEGATIVE
S PYO AG THROAT QL IA.RAPID: NEGATIVE
SARS-COV-2 RNA RESP QL NAA+PROBE: NOT DETECTED

## 2024-03-06 PROCEDURE — 99214 OFFICE O/P EST MOD 30 MIN: CPT

## 2024-03-06 PROCEDURE — 71046 X-RAY EXAM CHEST 2 VIEWS: CPT | Performed by: EMERGENCY MEDICINE

## 2024-03-06 PROCEDURE — 87651 STREP A DNA AMP PROBE: CPT | Performed by: EMERGENCY MEDICINE

## 2024-03-06 PROCEDURE — 87502 INFLUENZA DNA AMP PROBE: CPT | Performed by: EMERGENCY MEDICINE

## 2024-03-06 RX ORDER — ALBUTEROL SULFATE 2.5 MG/3ML
2.5 SOLUTION RESPIRATORY (INHALATION) EVERY 4 HOURS PRN
Qty: 30 EACH | Refills: 0 | Status: SHIPPED | OUTPATIENT
Start: 2024-03-06 | End: 2024-03-08

## 2024-03-06 RX ORDER — PREDNISONE 20 MG/1
40 TABLET ORAL DAILY
Qty: 10 TABLET | Refills: 0 | Status: SHIPPED | OUTPATIENT
Start: 2024-03-06 | End: 2024-03-11

## 2024-03-06 NOTE — ED PROVIDER NOTES
Patient Seen in: [facility]      History     Chief Complaint   Patient presents with    Asthma     Stated Complaint: Asthma    Subjective:   HPI    The patient is a 23-year-old female with past history of asthma who presents now with cough, asthma exacerbation.  The patient states the symptoms started on Sunday.  Patient has had mild sore throat and some nasal congestion.  Patient denies any fever.  The patient denies any focal chest pain.  The patient is not on birth control.    Objective:   Past Medical History:   Diagnosis Date    Asthma (HCC)     Obesity     Osteoarthritis               Past Surgical History:   Procedure Laterality Date    OTHER SURGICAL HISTORY  01/09/2023    Right ankle arthroscopy with synovectomy    TEAR DUCT SYSTEM SURG UNLISTED  2002                No pertinent social history.            Review of Systems    Positive for stated complaint: Asthma  Other systems are as noted in HPI.  Constitutional and vital signs reviewed.      All other systems reviewed and negative except as noted above.    Physical Exam     ED Triage Vitals [03/06/24 1627]   BP (!) 126/99   Pulse 94   Resp 20   Temp 97.6 °F (36.4 °C)   Temp src Temporal   SpO2 98 %   O2 Device None (Room air)       Current:BP (!) 126/99   Pulse 94   Temp 97.6 °F (36.4 °C) (Temporal)   Resp 20   LMP 12/08/2023 (Exact Date)   SpO2 98%         Physical Exam    Constitutional: Well-developed well-nourished in no acute distress  Head: Normocephalic, no swelling or tenderness  Eyes: Nonicteric sclera, no conjunctival injection  ENT: TMs are clear and flat bilaterally.  There is no posterior pharyngeal erythema  Chest: Clear to auscultation, trace scattered wheezing  Cardiovascular: Regular rate and rhythm without murmur  Abdomen: Soft, nontender and nondistended  Neurologic: Patient is awake, alert and oriented ×3.  The patient's motor strength is 5 out of 5 and symmetric in the upper and lower extremities  bilaterally  Extremities: No focal swelling or tenderness  Skin: No pallor, no redness or warmth to the touch      ED Course     Labs Reviewed   RAPID STREP A - Normal   POCT FLU TEST - Normal    Narrative:     This assay is a rapid molecular in vitro test utilizing nucleic acid amplification of influenza A and B viral RNA.   RAPID SARS-COV-2 BY PCR - Normal             Pulse ox is 98% on room air, normal.  Vital signs are stable on ice  PERC score is 0     Patient's x-ray result was independently reviewed by myself.  There is no acute infiltrate noted.    Patient's negative COVID, strep, flu were reviewed with the patient.  Probable viral etiology of the patient's symptoms.  Will initiate short course of prednisone and refill the patient's albuterol for her nebulizer.  She states she has enough inhaler at home    MDM      Viral URI versus asthma exacerbation                                   Medical Decision Making      Disposition and Plan     Clinical Impression:  1. Viral URI with cough    2. Mild intermittent asthma with exacerbation (HCC)         Disposition:  Discharge  3/6/2024  4:57 pm    Follow-up:  Jamal Perez MD  [address] [place] [address] [id number] [phone]    In 3 days  If symptoms worsen          Medications Prescribed:  Current Discharge Medication List        START taking these medications    Details   !! albuterol (2.5 MG/3ML) 0.083% Inhalation Nebu Soln Take 3 mL (2.5 mg total) by nebulization every 4 (four) hours as needed for Wheezing or Shortness of Breath.  Qty: 30 each, Refills: 0       !! - Potential duplicate medications found. Please discuss with provider.

## 2024-03-06 NOTE — ED INITIAL ASSESSMENT (HPI)
Patient reports asthma exacerbation starting on Saturday.  Denies uri symptoms.  Reports wheezing and feeling short of breath.

## 2024-03-08 ENCOUNTER — TELEMEDICINE (OUTPATIENT)
Dept: FAMILY MEDICINE CLINIC | Facility: CLINIC | Age: 24
End: 2024-03-08
Payer: MEDICAID

## 2024-03-08 DIAGNOSIS — J45.21 MILD INTERMITTENT ASTHMA WITH EXACERBATION (HCC): Primary | ICD-10-CM

## 2024-03-08 RX ORDER — ALBUTEROL SULFATE 2.5 MG/3ML
2.5 SOLUTION RESPIRATORY (INHALATION) EVERY 4 HOURS PRN
Qty: 90 ML | Refills: 2 | Status: SHIPPED | OUTPATIENT
Start: 2024-03-08

## 2024-03-08 RX ORDER — FLUTICASONE PROPIONATE AND SALMETEROL XINAFOATE 230; 21 UG/1; UG/1
1 AEROSOL, METERED RESPIRATORY (INHALATION) 2 TIMES DAILY
Qty: 12 G | Refills: 0 | Status: SHIPPED | OUTPATIENT
Start: 2024-03-08

## 2024-03-08 NOTE — PROGRESS NOTES
9HPI    Virtual Telephone/Video Check-In    Anne Walter verbally consents to a Virtual/Telephone Check-In visit on 03/08/24.  Patient has been referred to the [facility] website at [email or web address] to review the yearly Consent to Treat document.    Patient understands and accepts financial responsibility for any deductible, co-insurance and/or co-pays associated with this service.    Duration of the service: 10 minutes      Summary of topics discussed:     Patient presents for follow up for asthma.  Feels like her inhaler is not working at well.  Needs a refill on albuterol ampules for nebulizer, as well.  Was recently in the  on 3/6 for asthma exacerbation.  Has been having intermittent shortness of breath and wheezing ever since.  Was prescribed maintenance inhalers in the past but is not using anything right now.      Review of Systems   Constitutional:  Negative for fever.   Respiratory:  Positive for shortness of breath and wheezing.         There were no vitals filed for this visit.  There is no height or weight on file to calculate BMI.    Health Maintenance   Topic Date Due    Asthma Action Plan  Never done    Pap Smear  Never done    COVID-19 Vaccine (3 - 2023-24 season) 09/01/2023    HTN: BP Follow-Up  03/08/2024    Influenza Vaccine (1) 06/30/2024 (Originally 10/1/2023)    Pneumococcal Vaccine: Birth to 64yrs (1 of 2 - PCV) 01/31/2025 (Originally 8/2/2004)    DTaP,Tdap,and Td Vaccines (7 - Td or Tdap) 01/31/2025 (Originally 4/29/2020)    Annual Physical  01/31/2025    Asthma Control Test  01/31/2025    Annual Depression Screening /  fCompleted    HPV Vaccines  Completed       Patient's last menstrual period was 12/08/2023 (exact date).    Past Medical History:   Diagnosis Date    Asthma (HCC)     Obesity     Osteoarthritis        .  Past Surgical History:   Procedure Laterality Date    Other surgical history  01/09/2023    Right ankle arthroscopy with synovectomy    Tear duct system surg  unlisted  2002       Family History   Problem Relation Age of Onset    No Known Problems Father     No Known Problems Mother     Diabetes Other     Cancer Neg     Heart Disorder Neg     Hypertension Neg     Breast Cancer Neg     Ovarian Cancer Neg     Uterine Cancer Neg     Colon Cancer Neg        Social History     Socioeconomic History    Marital status: Single     Spouse name: Not on file    Number of children: Not on file    Years of education: Not on file    Highest education level: Not on file   Occupational History    Not on file   Tobacco Use    Smoking status: Never    Smokeless tobacco: Never   Vaping Use    Vaping Use: Never used   Substance and Sexual Activity    Alcohol use: Not Currently     Comment: occasional    Drug use: Not Currently     Comment: occasional gummies    Sexual activity: Not on file   Other Topics Concern    Not on file   Social History Narrative    Not on file     Social Determinants of Health     Financial Resource Strain: Not on file   Food Insecurity: Not on file   Transportation Needs: Not on file   Physical Activity: Not on file   Stress: Not on file   Social Connections: Not on file   Housing Stability: Not on file       Current Outpatient Medications   Medication Sig Dispense Refill    fluticasone-salmeterol (ADVAIR HFA) 230-21 MCG/ACT Inhalation Aerosol Inhale 1 puff into the lungs 2 (two) times daily. 12 g 0    albuterol (2.5 MG/3ML) 0.083% Inhalation Nebu Soln Take 3 mL (2.5 mg total) by nebulization every 4 (four) hours as needed for Wheezing or Shortness of Breath. 90 mL 2    predniSONE 20 MG Oral Tab Take 2 tablets (40 mg total) by mouth daily for 5 days. 10 tablet 0    albuterol 108 (90 Base) MCG/ACT Inhalation Aero Soln Inhale 2 puffs into the lungs every 4 (four) hours as needed for Wheezing. 1 each 0    meclizine 25 MG Oral Tab Take 1 tablet (25 mg total) by mouth 3 (three) times daily as needed. 30 tablet 0    fexofenadine-pseudoephedrine ER (ALLEGRA-D ALLERGY &  CONGESTION)  MG Oral Tablet 12 Hr Take 1 tablet by mouth 2 (two) times daily.      ibuprofen 800 MG Oral Tab Take 1 tablet (800 mg total) by mouth every 6 (six) hours as needed for Pain. 60 tablet 1    Respiratory Therapy Supplies (NEBULIZER/TUBING/MOUTHPIECE) Does not apply Kit Use as needed with nebulizer machine and albuterol solution every 4-6 hours for wheezing or shortness of breath 1 Package 0    Spacer/Aero-Holding Chambers Does not apply Device Use with albuterol inhaler 1 Device 0       Allergies:  No Known Allergies    Physical Exam  Constitutional:       Appearance: Normal appearance.   Pulmonary:      Effort: No respiratory distress.   Neurological:      Mental Status: She is alert and oriented to person, place, and time.          Assessment and Plan:  Problem List Items Addressed This Visit    None  Visit Diagnoses       Mild intermittent asthma with exacerbation (HCC)    -  Primary    Relevant Medications    fluticasone-salmeterol (ADVAIR HFA) 230-21 MCG/ACT Inhalation Aerosol    albuterol (2.5 MG/3ML) 0.083% Inhalation Nebu Soln           To start advair bid, refill of albuterol to pharmacy on file.  Follow up for in person assessment in 2 weeks.       Discussed plan of care with patient and patient is in agreement.  All questions answered. Patient to call with questions or concerns.    Encouraged to sign up for My Chart if not already registered.

## 2024-03-11 DIAGNOSIS — J45.21 MILD INTERMITTENT ASTHMA WITH EXACERBATION (HCC): ICD-10-CM

## 2024-03-11 RX ORDER — ALBUTEROL SULFATE 2.5 MG/3ML
2.5 SOLUTION RESPIRATORY (INHALATION) EVERY 4 HOURS PRN
Qty: 90 ML | Refills: 2 | OUTPATIENT
Start: 2024-03-11

## 2024-03-11 NOTE — TELEPHONE ENCOUNTER
Patient wants a prescription for albuterol sulfate solution.    [id number] - [place] - [address] AT [facility] [address] [place] [address], [phone], [phone]

## 2024-03-13 ENCOUNTER — APPOINTMENT (OUTPATIENT)
Dept: PHYSICAL THERAPY | Age: 24
End: 2024-03-13
Attending: NURSE PRACTITIONER
Payer: MEDICAID

## 2024-03-20 ENCOUNTER — HOSPITAL ENCOUNTER (OUTPATIENT)
Age: 24
Discharge: HOME | End: 2024-03-20
Payer: MEDICAID

## 2024-03-20 VITALS
RESPIRATION RATE: 20 BRPM | SYSTOLIC BLOOD PRESSURE: 140 MMHG | DIASTOLIC BLOOD PRESSURE: 86 MMHG | HEART RATE: 98 BPM | TEMPERATURE: 98 F | OXYGEN SATURATION: 99 %

## 2024-03-20 DIAGNOSIS — S09.91XA TRAUMA OF EAR CANAL, INITIAL ENCOUNTER: Primary | ICD-10-CM

## 2024-03-20 PROCEDURE — 99213 OFFICE O/P EST LOW 20 MIN: CPT | Performed by: NURSE PRACTITIONER

## 2024-03-20 RX ORDER — NEOMYCIN SULFATE, POLYMYXIN B SULFATE AND HYDROCORTISONE 10; 3.5; 1 MG/ML; MG/ML; [USP'U]/ML
3 SUSPENSION/ DROPS AURICULAR (OTIC) 4 TIMES DAILY
Qty: 10 ML | Refills: 0 | Status: SHIPPED | OUTPATIENT
Start: 2024-03-20 | End: 2024-03-25

## 2024-03-20 NOTE — DISCHARGE INSTRUCTIONS
You have some blood in the canal of your ear.  It is unclear what caused the trauma.  Use the eardrops as directed.  Continue 2 to 3 tablets of ibuprofen every 6 hours as needed for pain.  Apply warm compress to the ear.  Avoid sticking anything in the ear, including ear pods.  Follow-up with your primary doctor if no improvement.

## 2024-03-20 NOTE — ED PROVIDER NOTES
Patient Seen in: [facility]      History     Chief Complaint   Patient presents with    Ear Problem Pain     Stated Complaint: ear issue    Subjective:   23-year-old female with asthma presents from home with complaint of right ear pain.  Symptoms for 3 days.  Difficulty sleeping last night due to the pain.  No drainage from the ear.  No fever.  No known trauma to the ear.  No recent URI symptoms.  No history of recurrent otitis media.    The history is provided by the patient. No  was used.         Objective:   No pertinent past medical history.        HISTORY:  Past Medical History:   Diagnosis Date    Asthma (HCC)     Obesity     Osteoarthritis       Past Surgical History:   Procedure Laterality Date    OTHER SURGICAL HISTORY  01/09/2023    Right ankle arthroscopy with synovectomy    TEAR DUCT SYSTEM SURG UNLISTED  2002      Family History   Problem Relation Age of Onset    No Known Problems Father     No Known Problems Mother     Diabetes Other     Cancer Neg     Heart Disorder Neg     Hypertension Neg     Breast Cancer Neg     Ovarian Cancer Neg     Uterine Cancer Neg     Colon Cancer Neg       Social History     Socioeconomic History    Marital status: Single   Tobacco Use    Smoking status: Never    Smokeless tobacco: Never   Vaping Use    Vaping Use: Never used   Substance and Sexual Activity    Alcohol use: Not Currently     Comment: occasional    Drug use: Not Currently     Comment: occasional gummies            No pertinent past surgical history.              No pertinent social history.            Review of Systems    Positive for stated complaint: ear issue  Other systems are as noted in HPI.  Constitutional and vital signs reviewed.      All other systems reviewed and negative except as noted above.    Physical Exam     ED Triage Vitals [03/20/24 1644]   /86   Pulse 98   Resp 20   Temp 97.9 °F (36.6 °C)   Temp src Temporal   SpO2 99 %   O2 Device None (Room air)        Current:/86   Pulse 98   Temp 97.9 °F (36.6 °C) (Temporal)   Resp 20   LMP 12/08/2023 (Exact Date)   SpO2 99%         Physical Exam  Vitals and nursing note reviewed.   Constitutional:       General: She is not in acute distress.     Appearance: Normal appearance. She is not ill-appearing or toxic-appearing.   HENT:      Head: Normocephalic and atraumatic.      Right Ear: Tympanic membrane and external ear normal. Drainage (dried blood on bottom of canal) and tenderness present. No mastoid tenderness.      Left Ear: Tympanic membrane, ear canal and external ear normal. No mastoid tenderness.      Nose: Nose normal.      Mouth/Throat:      Mouth: Mucous membranes are moist.      Pharynx: Oropharynx is clear. No pharyngeal swelling or posterior oropharyngeal erythema.   Eyes:      Pupils: Pupils are equal, round, and reactive to light.   Cardiovascular:      Rate and Rhythm: Normal rate and regular rhythm.      Pulses: Normal pulses.   Pulmonary:      Effort: Pulmonary effort is normal. No respiratory distress.      Breath sounds: Normal breath sounds.      Comments: Lungs clear.  No adventitious lung sounds.  No distress.  No hypoxia.  Pulse ox 99% ra. Which is normal    Abdominal:      General: Abdomen is flat.      Palpations: Abdomen is soft.   Musculoskeletal:         General: No signs of injury. Normal range of motion.      Cervical back: Normal range of motion and neck supple.   Skin:     General: Skin is warm and dry.      Capillary Refill: Capillary refill takes less than 2 seconds.   Neurological:      General: No focal deficit present.      Mental Status: She is alert and oriented to person, place, and time.      GCS: GCS eye subscore is 4. GCS verbal subscore is 5. GCS motor subscore is 6.   Psychiatric:         Mood and Affect: Mood normal.         Behavior: Behavior normal.         Thought Content: Thought content normal.         Judgment: Judgment normal.           ED Course   Labs  Reviewed - No data to display    MDM        Medical Decision Making  Right ear pain  Differential diagnosis: Otitis media, otitis externa  Bilateral TMs are clear without evidence of infection. No TM rupture  Right canal has dried blood pooling in the bottom.  The canal is tender to touch.  No visualized trauma.  Denies history of trauma.  She is wearing air pods.  No evidence of mastoiditis  Symptoms appear consistent with mild localized trauma  Plan of care: Cortisporin drops.  Ibuprofen.  Warm packs.  Avoid wearing air pods or putting anything in the ear.  Follow-up with your primary doctor if no improvement  Results and plan of care discussed with the patient/family. They are in agreement with discharge. They understand to follow up with their primary doctor or the referral physician for further evaluation, especially if no improvement.  Also discussed the limitations of immediate care, patient is aware that if symptoms are worse they should go to the emergency room. Verbal and written discharge instructions were given.       Problems Addressed:  Trauma of ear canal, initial encounter: acute illness or injury    Risk  OTC drugs.  Prescription drug management.        Disposition and Plan     Clinical Impression:  1. Trauma of ear canal, initial encounter         Disposition:  Discharge  3/20/2024  4:48 pm    Follow-up:  Jamal Perez MD  [address] [place] [id number]  [phone]                Medications Prescribed:  Discharge Medication List as of 3/20/2024  4:50 PM        START taking these medications    Details   neomycin-polymyxin-hydrocortisone 3.5-46078-5 Otic Suspension Place 3 drops into the right ear 4 (four) times daily for 5 days., Normal, Disp-10 mL, R-0

## 2024-03-21 ENCOUNTER — TELEPHONE (OUTPATIENT)
Dept: FAMILY MEDICINE CLINIC | Facility: CLINIC | Age: 24
End: 2024-03-21

## 2024-03-21 DIAGNOSIS — J45.21 MILD INTERMITTENT ASTHMA WITH EXACERBATION (HCC): Primary | ICD-10-CM

## 2024-03-21 RX ORDER — BUDESONIDE AND FORMOTEROL FUMARATE DIHYDRATE 160; 4.5 UG/1; UG/1
2 AEROSOL RESPIRATORY (INHALATION) 2 TIMES DAILY
Qty: 10.2 G | Refills: 2 | Status: SHIPPED | OUTPATIENT
Start: 2024-03-21

## 2024-03-21 NOTE — TELEPHONE ENCOUNTER
Current Outpatient Medications:     fluticasone-salmeterol (ADVAIR HFA) 230-21 MCG/ACT Inhalation Aerosol, Inhale 1 puff into the lungs 2 (two) times daily., Disp: 12 g, Rfl: 0    Key: [id number]  
Alternate sent into pharmacy on file.   
Preferred alternative per insurance:  Matthew Hebertxela  Symbicort    Please advise   
fever since August 9 resolved came back yesterday  whisky colored urine today  Pt observed to be pale looking

## 2024-03-26 RX ORDER — IBUPROFEN 800 MG/1
800 TABLET ORAL EVERY 6 HOURS PRN
Qty: 60 TABLET | Refills: 1 | Status: SHIPPED | OUTPATIENT
Start: 2024-03-26

## 2024-03-27 ENCOUNTER — APPOINTMENT (OUTPATIENT)
Dept: PHYSICAL THERAPY | Age: 24
End: 2024-03-27
Attending: PHYSICAL THERAPIST
Payer: MEDICAID

## 2024-03-31 ENCOUNTER — HOSPITAL ENCOUNTER (OUTPATIENT)
Age: 24
Discharge: HOME | End: 2024-03-31
Payer: MEDICAID

## 2024-03-31 VITALS
OXYGEN SATURATION: 98 % | RESPIRATION RATE: 24 BRPM | DIASTOLIC BLOOD PRESSURE: 78 MMHG | TEMPERATURE: 97 F | SYSTOLIC BLOOD PRESSURE: 124 MMHG | HEART RATE: 79 BPM

## 2024-03-31 DIAGNOSIS — Z20.822 ENCOUNTER FOR LABORATORY TESTING FOR COVID-19 VIRUS: ICD-10-CM

## 2024-03-31 DIAGNOSIS — H92.01 RIGHT EAR PAIN: Primary | ICD-10-CM

## 2024-03-31 DIAGNOSIS — J06.9 VIRAL UPPER RESPIRATORY TRACT INFECTION: ICD-10-CM

## 2024-03-31 LAB
POCT INFLUENZA A: NEGATIVE
POCT INFLUENZA B: NEGATIVE
SARS-COV-2 RNA RESP QL NAA+PROBE: NOT DETECTED

## 2024-03-31 RX ORDER — PREDNISONE 20 MG/1
40 TABLET ORAL DAILY
Qty: 10 TABLET | Refills: 0 | Status: SHIPPED | OUTPATIENT
Start: 2024-03-31 | End: 2024-04-05

## 2024-03-31 NOTE — ED PROVIDER NOTES
Patient Seen in: [facility]      History     Chief Complaint   Patient presents with    Ear Pain    Asthma     Stated Complaint: Ear Pain, SOB    Subjective:   Anne is a 23-year-old female presenting to the immediate care complaining of right ear pain, cough, congestion, rhinorrhea.  Patient states that she has had the ear pain for about 2 weeks.  Was seen here on 3/21 and given Corticosporin drops without any relief.  States that she has had persistent pain to the right ear.  No acutely worsening pain.  She has not had a fever.  No drainage from the ear.  No injury or trauma.  Patient is also had some mild cough, congestion and rhinorrhea for the past few days.  Denies any episodes of respiratory distress or severe difficulty breathing.  She does have a history of asthma.  States that she has been using her albuterol inhaler about every 6 hours and Symbicort daily.  Patient is speaking in full sentences without any respiratory distress.  She denies any chest pain, abdominal pain or vomiting.  She is eating and drinking well and is well-hydrated.  She denies any concerns or complaints.          Objective:   Past Medical History:   Diagnosis Date    Asthma (HCC)     Obesity     Osteoarthritis               Past Surgical History:   Procedure Laterality Date    OTHER SURGICAL HISTORY  01/09/2023    Right ankle arthroscopy with synovectomy    TEAR DUCT SYSTEM SURG UNLISTED  2002                Social History     Socioeconomic History    Marital status: Single   Tobacco Use    Smoking status: Never    Smokeless tobacco: Never   Vaping Use    Vaping Use: Never used   Substance and Sexual Activity    Alcohol use: Not Currently     Comment: occasional    Drug use: Not Currently     Comment: occasional gummies              Review of Systems    Positive for stated complaint: Ear Pain, SOB  Other systems are as noted in HPI.  Constitutional and vital signs reviewed.      All other systems reviewed and  negative except as noted above.    Physical Exam     ED Triage Vitals [03/31/24 0808]   /78   Pulse 79   Resp 24   Temp 97.4 °F (36.3 °C)   Temp src Temporal   SpO2 98 %   O2 Device None (Room air)       Current:/78   Pulse 79   Temp 97.4 °F (36.3 °C) (Temporal)   Resp 24   LMP 03/01/2024   SpO2 98%         Physical Exam  Vitals and nursing note reviewed.   Constitutional:       General: She is not in acute distress.     Appearance: Normal appearance. She is not ill-appearing, toxic-appearing or diaphoretic.   HENT:      Head: Normocephalic.      Right Ear: Tympanic membrane, ear canal and external ear normal. Drainage present. No laceration, swelling or tenderness. No middle ear effusion. No foreign body. No mastoid tenderness.      Left Ear: Tympanic membrane, ear canal and external ear normal.      Ears:      Comments: Very small amount of dried blood at the bottom of the your canal.  TM normal.       Nose: Congestion present.      Mouth/Throat:      Mouth: Mucous membranes are moist.      Pharynx: Oropharynx is clear.   Eyes:      Conjunctiva/sclera: Conjunctivae normal.   Cardiovascular:      Rate and Rhythm: Normal rate and regular rhythm.      Pulses: Normal pulses.      Heart sounds: Normal heart sounds.   Pulmonary:      Effort: Pulmonary effort is normal. No tachypnea, bradypnea, accessory muscle usage, prolonged expiration, respiratory distress or retractions.      Breath sounds: Normal breath sounds. No stridor, decreased air movement or transmitted upper airway sounds. No decreased breath sounds, wheezing, rhonchi or rales.   Abdominal:      General: Abdomen is flat.   Musculoskeletal:         General: Normal range of motion.      Cervical back: Normal range of motion.   Skin:     General: Skin is warm.      Capillary Refill: Capillary refill takes less than 2 seconds.   Neurological:      General: No focal deficit present.      Mental Status: She is alert and oriented to person, place,  and time.   Psychiatric:         Mood and Affect: Mood normal.         Behavior: Behavior normal.         Thought Content: Thought content normal.         Judgment: Judgment normal.           ED Course     Labs Reviewed   POCT FLU TEST - Normal    Narrative:     This assay is a rapid molecular in vitro test utilizing nucleic acid amplification of influenza A and B viral RNA.   RAPID SARS-COV-2 BY PCR - Normal          MDM           Medical Decision Making  Multiple medical diagnoses were considered including but not limited to otitis media, otitis externa, otalgia, mastoiditis.  Separately considered asthma exacerbation versus URI.  Patient is well appearing, non-toxic and in no acute distress.  Vital signs are stable.   No signs of infection on the patient's physical exam.  She has some very mild pain with palpation surrounding the ear.  No erythema or swelling.  No signs of mastoiditis.  She has not had a fever.  There is a tiny amount of dried blood in the bottom of the ear canal otherwise no drainage.  TM was normal.  Patient was on Corticosporin last week for same complaints. Denies worsening complaints today states that the pain is just no better. I recommended the patient make an appointment to follow-up with an ear nose and throat specialist.  During the patient's visit we pulled up her MyChart and made her an appointment to see Dr. Roberts in 3 days.  COVID and flu tests were negative.  Sent prescription for short course of prednisone to treat asthma exacerbation.  Patient has albuterol nebulizer and inhaler at home.  Discussed using the albuterol nebulizer or inhaler every 4-6 hours as needed for cough.  Recommended that if the patient needs the albuterol more than every 4 hours they go to the emergency department.  Also recommended that if the patient has any increased respiratory distress, fever that does not resolve with medication or any other concerning complaints they should go to the emergency  department.  I also recommended the patient make an appointment next week to follow-up with primary care doctor to ensure resolution/improvement of symptoms. She has an appointment with PCP in 2 days.   ED precautions discussed.  Patient advised to follow up with PCP in 2-3 days.  Patient agrees with this plan of care.  Patient verbalizes understanding of discharge instructions and plan of care.      Amount and/or Complexity of Data Reviewed  Labs: ordered. Decision-making details documented in ED Course.    Risk  OTC drugs.  Prescription drug management.        Disposition and Plan     Clinical Impression:  1. Right ear pain    2. Encounter for laboratory testing for COVID-19 virus    3. Viral upper respiratory tract infection         Disposition:  Discharge  3/31/2024  8:42 am    Follow-up:  Jamal Perez MD  [address] [place] [address] [id number] [phone]          Francisco Roberts DO  [address] [place] [id number]  [phone]                Medications Prescribed:  Discharge Medication List as of 3/31/2024  8:50 AM

## 2024-03-31 NOTE — DISCHARGE INSTRUCTIONS
No signs of infection in your ear.  Please be sure to follow-up with the ear nose and throat specialist as discussed during your visit.  Please take Tylenol and Motrin for pain as needed.  If you develop any acutely worsening pain, fever or any other concerning complaints you should go to the emergency department.    Your COVID and Flu tests are negative.   Take the prednisone as prescribed for your asthma exacerbation.   Continue to use your albuterol and symbicort for cough.  If you need the albuterol more than every 4 hours you should go to the emergency department.  If you have any increased respiratory distress, fever that does not resolve with medication or any other concerning complaints they should go to the emergency department.  You should make an appointment later this week to follow-up with primary care doctor to ensure resolution/improvement of symptoms.

## 2024-03-31 NOTE — ED INITIAL ASSESSMENT (HPI)
Pt presents with right ear pain x 1 week. Pt also reports cough, congestion and SOB, pt has hx of asthma. Pt has been using daily Symbicort inhaler and rescue inhaler this week. Pt used nebulizer last 2 days ago.     No fever.

## 2024-04-02 ENCOUNTER — OFFICE VISIT (OUTPATIENT)
Dept: FAMILY MEDICINE CLINIC | Facility: CLINIC | Age: 24
End: 2024-04-02

## 2024-04-02 VITALS
HEART RATE: 96 BPM | BODY MASS INDEX: 70 KG/M2 | SYSTOLIC BLOOD PRESSURE: 121 MMHG | DIASTOLIC BLOOD PRESSURE: 79 MMHG | WEIGHT: 293 LBS

## 2024-04-02 DIAGNOSIS — J45.20 MILD INTERMITTENT ASTHMA WITHOUT COMPLICATION (HCC): Primary | ICD-10-CM

## 2024-04-02 PROCEDURE — 99213 OFFICE O/P EST LOW 20 MIN: CPT | Performed by: NURSE PRACTITIONER

## 2024-04-02 NOTE — PROGRESS NOTES
HPI    Patient presents for follow up for asthma.  Recently started symbicort twice daily.  Wheezing has stopped.  Still with some shortness of breath but has calmed down significantly.      Review of Systems   Respiratory:  Positive for shortness of breath. Negative for chest tightness and wheezing.         Vitals:    04/02/24 1733   BP: 121/79   Pulse: 96   Weight: (!) 417 lb 12.8 oz (189.5 kg)     Body mass index is 69.53 kg/m².    Health Maintenance   Topic Date Due    Asthma Action Plan  Never done    Pap Smear  Never done    COVID-19 Vaccine (3 - 2023-24 season) 09/01/2023    HTN: BP Follow-Up  03/08/2024    Pneumococcal Vaccine: Birth to 64yrs (1 of 2 - PCV) 01/31/2025 (Originally 8/2/2004)    DTaP,Tdap,and Td Vaccines (7 - Td or Tdap) 01/31/2025 (Originally 4/29/2020)    Influenza Vaccine (Season Ended) 10/01/2024    Annual Physical  01/31/2025    Asthma Control Test  01/31/2025    Annual Depression Screening  Completed    HPV Vaccines  Completed       Patient's last menstrual period was 03/01/2024.    Past Medical History:   Diagnosis Date    Asthma (HCC)     Obesity     Osteoarthritis        .  Past Surgical History:   Procedure Laterality Date    Other surgical history  01/09/2023    Right ankle arthroscopy with synovectomy    Tear duct system surg unlisted  2002       Family History   Problem Relation Age of Onset    No Known Problems Father     No Known Problems Mother     Diabetes Other     Cancer Neg     Heart Disorder Neg     Hypertension Neg     Breast Cancer Neg     Ovarian Cancer Neg     Uterine Cancer Neg     Colon Cancer Neg        Social History     Socioeconomic History    Marital status: Single     Spouse name: Not on file    Number of children: Not on file    Years of education: Not on file    Highest education level: Not on file   Occupational History    Not on file   Tobacco Use    Smoking status: Never    Smokeless tobacco: Never   Vaping Use    Vaping Use: Never used   Substance and  Sexual Activity    Alcohol use: Not Currently     Comment: occasional    Drug use: Not Currently     Comment: occasional gummies    Sexual activity: Not on file   Other Topics Concern    Not on file   Social History Narrative    Not on file     Social Determinants of Health     Financial Resource Strain: Not on file   Food Insecurity: Not on file   Transportation Needs: Not on file   Physical Activity: Not on file   Stress: Not on file   Social Connections: Not on file   Housing Stability: Not on file       Current Outpatient Medications   Medication Sig Dispense Refill    IBUPROFEN 800 MG Oral Tab TAKE 1 TABLET(800 MG) BY MOUTH EVERY 6 HOURS AS NEEDED FOR PAIN 60 tablet 1    Budesonide-Formoterol Fumarate (SYMBICORT) 160-4.5 MCG/ACT Inhalation Aerosol Inhale 2 puffs into the lungs 2 (two) times daily. 10.2 g 2    albuterol (2.5 MG/3ML) 0.083% Inhalation Nebu Soln Take 3 mL (2.5 mg total) by nebulization every 4 (four) hours as needed for Wheezing or Shortness of Breath. 90 mL 2    fexofenadine-pseudoephedrine ER (ALLEGRA-D ALLERGY & CONGESTION)  MG Oral Tablet 12 Hr Take 1 tablet by mouth 2 (two) times daily.      meclizine 25 MG Oral Tab Take 1 tablet (25 mg total) by mouth 3 (three) times daily as needed. 30 tablet 0    Respiratory Therapy Supplies (NEBULIZER/TUBING/MOUTHPIECE) Does not apply Kit Use as needed with nebulizer machine and albuterol solution every 4-6 hours for wheezing or shortness of breath (Patient not taking: Reported on 4/2/2024) 1 Package 0    Spacer/Aero-Holding Chambers Does not apply Device Use with albuterol inhaler (Patient not taking: Reported on 4/2/2024) 1 Device 0       Allergies:  No Known Allergies    Physical Exam  Vitals and nursing note reviewed.   Constitutional:       General: She is not in acute distress.     Appearance: Normal appearance.   HENT:      Head: Normocephalic and atraumatic.   Cardiovascular:      Rate and Rhythm: Normal rate and regular rhythm.      Heart  sounds: Normal heart sounds.   Pulmonary:      Effort: Pulmonary effort is normal. No respiratory distress.      Breath sounds: Normal breath sounds. No stridor. No wheezing, rhonchi or rales.   Chest:      Chest wall: No tenderness.   Neurological:      Mental Status: She is alert and oriented to person, place, and time.          Assessment and Plan:  Problem List Items Addressed This Visit    None  Visit Diagnoses       Mild intermittent asthma without complication (HCC)    -  Primary           Continue symbicort as prescribed.  Follow up as needed.       Discussed plan of care with patient and patient is in agreement.  All questions answered. Patient to call with questions or concerns.    Encouraged to sign up for My Chart if not already registered.

## 2024-04-05 ENCOUNTER — OFFICE VISIT (OUTPATIENT)
Dept: OTOLARYNGOLOGY | Facility: CLINIC | Age: 24
End: 2024-04-05

## 2024-04-05 DIAGNOSIS — M26.609 TMJ (TEMPOROMANDIBULAR JOINT DISORDER): Primary | ICD-10-CM

## 2024-04-05 DIAGNOSIS — H61.21 EXCESSIVE CERUMEN IN RIGHT EAR CANAL: ICD-10-CM

## 2024-04-05 DIAGNOSIS — H92.01 RIGHT EAR PAIN: ICD-10-CM

## 2024-04-05 PROCEDURE — 99214 OFFICE O/P EST MOD 30 MIN: CPT | Performed by: STUDENT IN AN ORGANIZED HEALTH CARE EDUCATION/TRAINING PROGRAM

## 2024-04-05 PROCEDURE — 69210 REMOVE IMPACTED EAR WAX UNI: CPT | Performed by: STUDENT IN AN ORGANIZED HEALTH CARE EDUCATION/TRAINING PROGRAM

## 2024-04-05 RX ORDER — MELOXICAM 15 MG/1
15 TABLET ORAL NIGHTLY
Qty: 30 TABLET | Refills: 0 | Status: SHIPPED | OUTPATIENT
Start: 2024-04-05 | End: 2024-05-05

## 2024-04-05 NOTE — PROGRESS NOTES
Anne Walter is a 23 year old female.   Chief Complaint   Patient presents with    Ear Problem     Right ear pain and bleeding x2 weeks       ASSESSMENT AND PLAN:   1. TMJ (temporomandibular joint disorder)   23-year-old presents in follow-up again regarding her right ear pain.  I seen her in September for similar issue.  At that time her ear exam was normal discussed temporomandibular joint disorder.  She has not been treated again for an ear infection on the right side.     She continues to have a mostly normal ear exam.  There is a small scab of blood that was removed from right ear canal floor but otherwise her ear was normal she did have exquisite tenderness of her temporomandibular joint    Discussed that again her ear looks for the most part normal although she did have exquisite tenderness of her temporomandibular joint.  Discussed that this issue may be more of a jaw joint issue related to the TMD.  Will try a stronger anti-inflammatory for 2 weeks and discussed having her follow-up regarding this with her dentist.  May want to consider a nightguard at night for bruxism.    2. Right ear pain      3. Excessive cerumen in right ear canal        The patient indicates understanding of these issues and agrees to the plan.      EXAM:   LMP 03/01/2024     Pertinent exam findings may also be noted above in assessment and plan     System Details   Skin Inspection - Normal.   Constitutional Overall appearance - Normal.   Head/Face Symmetric, TMJ tenderness not present    Eyes EOMI, PERRL   Right ear:  Canal clear, TM intact, no TIARRA   Left ear:  Canal clear, TM intact, no TIARRA   Nose: Septum midline, inferior turbinates not enlarged, nasal valves without collapse    Oral cavity/Oropharynx: No lesions or masses on inspection or palpation, tonsils symmetric    Neck: Soft without LAD, thyroid not enlarged  Voice clear/ no stridor   Other:      Scopes and Procedures:   Canals:  Right: Canal with cerumen preventing  adequate view of TM, debrided with instrumentation    Tympanic Membranes:  Right: Normal tympanic membrane.     TM Visualized Method:   Right TM examined via otomicroscopy.      PROCEDURE:   Removal of cerumen impaction   The cerumen impaction was completely removed on the right side using microscopy as necessary.   Removal was completed by using a curette and suction.           Current Outpatient Medications   Medication Sig Dispense Refill    Meloxicam 15 MG Oral Tab Take 1 tablet (15 mg total) by mouth at bedtime. 30 tablet 0    IBUPROFEN 800 MG Oral Tab TAKE 1 TABLET(800 MG) BY MOUTH EVERY 6 HOURS AS NEEDED FOR PAIN 60 tablet 1    Budesonide-Formoterol Fumarate (SYMBICORT) 160-4.5 MCG/ACT Inhalation Aerosol Inhale 2 puffs into the lungs 2 (two) times daily. 10.2 g 2    albuterol (2.5 MG/3ML) 0.083% Inhalation Nebu Soln Take 3 mL (2.5 mg total) by nebulization every 4 (four) hours as needed for Wheezing or Shortness of Breath. 90 mL 2    meclizine 25 MG Oral Tab Take 1 tablet (25 mg total) by mouth 3 (three) times daily as needed. 30 tablet 0    fexofenadine-pseudoephedrine ER (ALLEGRA-D ALLERGY & CONGESTION)  MG Oral Tablet 12 Hr Take 1 tablet by mouth 2 (two) times daily.      Respiratory Therapy Supplies (NEBULIZER/TUBING/MOUTHPIECE) Does not apply Kit Use as needed with nebulizer machine and albuterol solution every 4-6 hours for wheezing or shortness of breath (Patient not taking: Reported on 4/2/2024) 1 Package 0    Spacer/Aero-Holding Chambers Does not apply Device Use with albuterol inhaler (Patient not taking: Reported on 4/2/2024) 1 Device 0      Past Medical History:   Diagnosis Date    Asthma (HCC)     Obesity     Osteoarthritis       Social History:  Social History     Socioeconomic History    Marital status: Single   Tobacco Use    Smoking status: Never    Smokeless tobacco: Never   Vaping Use    Vaping Use: Never used   Substance and Sexual Activity    Alcohol use: Not Currently     Comment:  occasional    Drug use: Not Currently     Comment: occasional eriberto Johnston MD  4/5/2024  4:15 PM

## 2024-06-24 RX ORDER — IBUPROFEN 800 MG/1
800 TABLET ORAL EVERY 6 HOURS PRN
Qty: 60 TABLET | Refills: 1 | Status: SHIPPED | OUTPATIENT
Start: 2024-06-24

## 2024-07-19 ENCOUNTER — HOSPITAL ENCOUNTER (OUTPATIENT)
Age: 24
Discharge: HOME | End: 2024-07-19

## 2024-07-19 VITALS
HEART RATE: 74 BPM | RESPIRATION RATE: 16 BRPM | SYSTOLIC BLOOD PRESSURE: 129 MMHG | TEMPERATURE: 99 F | DIASTOLIC BLOOD PRESSURE: 63 MMHG | OXYGEN SATURATION: 100 %

## 2024-07-19 DIAGNOSIS — B97.89 VIRAL RESPIRATORY ILLNESS: Primary | ICD-10-CM

## 2024-07-19 DIAGNOSIS — J98.8 VIRAL RESPIRATORY ILLNESS: Primary | ICD-10-CM

## 2024-07-19 DIAGNOSIS — J45.901 MILD ASTHMA WITH ACUTE EXACERBATION, UNSPECIFIED WHETHER PERSISTENT (HCC): ICD-10-CM

## 2024-07-19 LAB — SARS-COV-2 RNA RESP QL NAA+PROBE: NOT DETECTED

## 2024-07-19 RX ORDER — PREDNISONE 20 MG/1
40 TABLET ORAL DAILY
Qty: 10 TABLET | Refills: 0 | Status: SHIPPED | OUTPATIENT
Start: 2024-07-19 | End: 2024-07-24

## 2024-07-19 RX ORDER — ALBUTEROL SULFATE 90 UG/1
2 AEROSOL, METERED RESPIRATORY (INHALATION) EVERY 4 HOURS PRN
Qty: 1 EACH | Refills: 0 | Status: SHIPPED | OUTPATIENT
Start: 2024-07-19 | End: 2024-08-18

## 2024-07-19 RX ORDER — IPRATROPIUM BROMIDE AND ALBUTEROL SULFATE 2.5; .5 MG/3ML; MG/3ML
3 SOLUTION RESPIRATORY (INHALATION) ONCE
Status: COMPLETED | OUTPATIENT
Start: 2024-07-19 | End: 2024-07-19

## 2024-07-19 NOTE — ED PROVIDER NOTES
Patient Seen in: [facility]      History     Chief Complaint   Patient presents with    Cough/URI     Stated Complaint: Cough/ Ear Pain    Subjective:   HPI    This is a 22yo female with hx of asthma and obesity presenting with cough and congestion for 1 week. Patient states, she has her symptoms for 1 week and feels like she is asthma issues. No fever, cp, sob, n/v/d. No home Covid testing.    Objective:   Past Medical History:    Asthma (HCC)    Obesity    Osteoarthritis              Past Surgical History:   Procedure Laterality Date    Other surgical history  01/09/2023    Right ankle arthroscopy with synovectomy    Tear duct system surg unlisted  2002                Social History     Socioeconomic History    Marital status: Single   Tobacco Use    Smoking status: Never    Smokeless tobacco: Never   Vaping Use    Vaping status: Never Used   Substance and Sexual Activity    Alcohol use: Not Currently     Comment: occasional    Drug use: Not Currently     Comment: occasional gummies     Social Determinants of Health      [facility] [employer] [employer]    [facility]              Review of Systems    Positive for stated Chief Complaint: Cough/URI    Other systems are as noted in HPI.  Constitutional and vital signs reviewed.      All other systems reviewed and negative except as noted above.    Physical Exam     ED Triage Vitals [07/19/24 0837]   /63   Pulse 74   Resp 16   Temp 98.8 °F (37.1 °C)   Temp src Temporal   SpO2 100 %   O2 Device None (Room air)       Current Vitals:   Vital Signs  BP: 129/63  Pulse: 74  Resp: 16  Temp: 98.8 °F (37.1 °C)  Temp src: Temporal    Oxygen Therapy  SpO2: 100 %  O2 Device: None (Room air)            Physical Exam  Vitals and nursing note reviewed.   Constitutional:       Appearance: Normal appearance.   HENT:      Right Ear: Tympanic membrane normal.      Left Ear: Tympanic membrane normal.      Nose: Congestion present. No rhinorrhea.       Mouth/Throat:      Mouth: Mucous membranes are moist.      Pharynx: Oropharynx is clear. No posterior oropharyngeal erythema.   Eyes:      Conjunctiva/sclera: Conjunctivae normal.   Cardiovascular:      Rate and Rhythm: Normal rate.   Pulmonary:      Effort: Pulmonary effort is normal.      Comments: + dry cough  Lungs are clear with faint expiratory wheeze.  Musculoskeletal:         General: Normal range of motion.      Cervical back: Normal range of motion. No rigidity or tenderness.   Lymphadenopathy:      Cervical: No cervical adenopathy.   Neurological:      Mental Status: She is alert and oriented to person, place, and time.   Psychiatric:         Mood and Affect: Mood normal.               ED Course     Labs Reviewed   RAPID SARS-COV-2 BY PCR - Normal               MDM                              Medical Decision Making  24yo female with cough and congestion. DDx URI vs Asthma exacerbation vs COVID vs another respiratory viral illness. No clinical indication for labs or imaging but Covid will be collected and Duoneb will be given.    Covid negative    Patient made aware of results. After neb tx lungs are clear and patient is better. Will be dc home with prednisone and albuterol inhaler refill and patient states he has albuterol solution at home for neb tx. Discussed out patient follow. All education and instruction placed in discharge paper work. Patient acknowledge understanding dc instructions.    Problems Addressed:  Mild asthma with acute exacerbation, unspecified whether persistent (HCC): acute illness or injury  Viral respiratory illness: acute illness or injury    Amount and/or Complexity of Data Reviewed  Labs: ordered. Decision-making details documented in ED Course.    Risk  OTC drugs.  Prescription drug management.        Disposition and Plan     Clinical Impression:  1. Viral respiratory illness    2. Mild asthma with acute exacerbation, unspecified whether persistent (HCC)          Disposition:  Discharge  7/19/2024  9:13 am    Follow-up:  Jamal Perez MD  [address] [facility] [address] [phone]    Schedule an appointment as soon as possible for a visit in 3 days      Percy Gaxiola MD  [address]  [facility] [address] [phone]      Resource for pulmonologist          Medications Prescribed:  Discharge Medication List as of 7/19/2024  9:13 AM        START taking these medications    Details   albuterol 108 (90 Base) MCG/ACT Inhalation Aero Soln Inhale 2 puffs into the lungs every 4 (four) hours as needed for Wheezing., Normal, Disp-1 each, R-0      predniSONE 20 MG Oral Tab Take 2 tablets (40 mg total) by mouth daily for 5 days., Normal, Disp-10 tablet, R-0

## 2024-07-19 NOTE — DISCHARGE INSTRUCTIONS
Follow up with primary care doctor and recommend following up with pulmonologist. If you develop any new or worsening symptoms for to emergency department. Take medication as prescribed.

## 2024-07-19 NOTE — ED INITIAL ASSESSMENT (HPI)
Pt c/o cough x 1 week with asthma exacerbation. Denies fevers, sore throat, congestion. Denies SOB, used inhaler few days ago.

## 2024-08-13 ENCOUNTER — TELEMEDICINE (OUTPATIENT)
Dept: FAMILY MEDICINE CLINIC | Facility: CLINIC | Age: 24
End: 2024-08-13
Payer: MEDICAID

## 2024-08-13 DIAGNOSIS — R59.1 LYMPHADENOPATHY OF HEAD AND NECK: Primary | ICD-10-CM

## 2024-08-13 DIAGNOSIS — U07.1 COVID-19: ICD-10-CM

## 2024-08-13 PROCEDURE — 99214 OFFICE O/P EST MOD 30 MIN: CPT | Performed by: NURSE PRACTITIONER

## 2024-08-13 NOTE — PROGRESS NOTES
HPI    Virtual Telephone/Video Check-In    Anne Walter verbally consents to a Virtual/Telephone Check-In visit on 08/13/24.  Patient has been referred to the [facility] website at [email or web address] to review the yearly Consent to Treat document.    Patient understands and accepts financial responsibility for any deductible, co-insurance and/or co-pays associated with this service.    Duration of the service: 7 minutes      Summary of topics discussed:     Patient presents for concerns of recurrent left sided /lymphadenopathy.  Recently seen at [facility] on 8/10 at which time she was diagnosed with Covid 19.  Patient concerned since this is the second time that she has had bad facial swelling on the left side.  With ear pain on the left side, as well.      Review of Systems   HENT:          Swelling of left side of face.        There were no vitals filed for this visit.  There is no height or weight on file to calculate BMI.    Health Maintenance   Topic Date Due    Asthma Action Plan  Never done    Pap Smear  Never done    COVID-19 Vaccine (3 - 2023-24 season) 09/01/2023    Pneumococcal Vaccine: Birth to 64yrs (1 of 2 - PCV) 01/31/2025 (Originally [birth year]2004)    DTaP,Tdap,and Td Vaccines (7 - Td or Tdap) 01/31/2025 (Originally 4/29/2020)    Influenza Vaccine (1) 10/01/2024    Annual Physical  01/31/2025    Asthma Control Test  01/31/2025    Annual Depression Screening  Completed    HPV Vaccines  Completed       Patient's last menstrual period was 07/12/2024 (within days).    Past Medical History:    Asthma (HCC)    Obesity    Osteoarthritis       .  Past Surgical History:   Procedure Laterality Date    Other surgical history  01/09/2023    Right ankle arthroscopy with synovectomy    Tear duct system surg unlisted  2002       Family History   Problem Relation Age of Onset    No Known Problems Father     No Known Problems Mother     Diabetes Other     Cancer Neg     Heart Disorder Neg     Hypertension Neg      Breast Cancer Neg     Ovarian Cancer Neg     Uterine Cancer Neg     Colon Cancer Neg        Social History     Socioeconomic History    Marital status: Single     Spouse name: Not on file    Number of children: Not on file    Years of education: Not on file    Highest education level: Not on file   Occupational History    Not on file   Tobacco Use    Smoking status: Never    Smokeless tobacco: Never   Vaping Use    Vaping status: Never Used   Substance and Sexual Activity    Alcohol use: Not Currently     Comment: occasional    Drug use: Not Currently     Comment: occasional gummies    Sexual activity: Not on file   Other Topics Concern    Not on file   Social History Narrative    Not on file     Social Determinants of Health     Financial Resource Strain: Not on file   Food Insecurity: Not on file   Transportation Needs: Not on file   Physical Activity: Not on file   Stress: Not on file   Social Connections: Not on file   Housing Stability: At Risk (8/18/2023)    [facility]     Living Situation: Not on file     Housing Problems: Not on file       Current Outpatient Medications   Medication Sig Dispense Refill    albuterol 108 (90 Base) MCG/ACT Inhalation Aero Soln Inhale 2 puffs into the lungs every 4 (four) hours as needed for Wheezing. 1 each 0    IBUPROFEN 800 MG Oral Tab TAKE 1 TABLET(800 MG) BY MOUTH EVERY 6 HOURS AS NEEDED FOR PAIN 60 tablet 1    Budesonide-Formoterol Fumarate (SYMBICORT) 160-4.5 MCG/ACT Inhalation Aerosol Inhale 2 puffs into the lungs 2 (two) times daily. 10.2 g 2    albuterol (2.5 MG/3ML) 0.083% Inhalation Nebu Soln Take 3 mL (2.5 mg total) by nebulization every 4 (four) hours as needed for Wheezing or Shortness of Breath. 90 mL 2    meclizine 25 MG Oral Tab Take 1 tablet (25 mg total) by mouth 3 (three) times daily as needed. 30 tablet 0    fexofenadine-pseudoephedrine ER (ALLEGRA-D ALLERGY & CONGESTION)  MG Oral Tablet 12 Hr Take 1 tablet  by mouth 2 (two) times daily.      Respiratory Therapy Supplies (NEBULIZER/TUBING/MOUTHPIECE) Does not apply Kit Use as needed with nebulizer machine and albuterol solution every 4-6 hours for wheezing or shortness of breath (Patient not taking: Reported on 4/2/2024) 1 Package 0    Spacer/Aero-Holding Chambers Does not apply Device Use with albuterol inhaler (Patient not taking: Reported on 4/2/2024) 1 Device 0       Allergies:  No Known Allergies    Physical Exam  Constitutional:       Appearance: Normal appearance.   Pulmonary:      Effort: No respiratory distress.   Neurological:      Mental Status: She is alert and oriented to person, place, and time.          Assessment and Plan:  Problem List Items Addressed This Visit    None  Visit Diagnoses       Lymphadenopathy of head and neck    -  Primary    Relevant Orders    ENT - INTERNAL    COVID-19        Relevant Orders    ENT - INTERNAL           Referral to ENT for assessment of recurrent lymphadenopathy.     Discussed plan of care with patient and patient is in agreement.  All questions answered. Patient to call with questions or concerns.    Encouraged to sign up for My Chart if not already registered.

## 2024-08-15 ENCOUNTER — TELEPHONE (OUTPATIENT)
Dept: FAMILY MEDICINE CLINIC | Facility: CLINIC | Age: 24
End: 2024-08-15

## 2024-08-15 NOTE — TELEPHONE ENCOUNTER
Spoke with patient, Date of Birth verified  She stated she had Telemed visit on 8-13-24, she tested positive for covid on 8-10-24.  She wants to know how long does covid stays in your system as she has no symptom at all.   Patient was informed it varies on a person how a person's body deal or fight with a virus.   Last Saturday she didn't know until she was tested, her face was swollen, she went to ER on 8/10/24 and was told she has a virus.   Patient was provided below CDC recommendations  She stated understanding.       FYI      10 Ways to Manage Your Health at Home       Day 1-5 stay home from work, school, and away from other public places. If you must go out, avoid using any kind of public transportation, ridesharing, or taxis.   Day 6-10 if your symptoms or resolving or sx free, you can leave the house and continue to wear mask around people.   Monitor your symptoms carefully. If your symptoms get worse, call your healthcare provider immediately.  Get rest and stay hydrated.   If you have a medical appointment, call the healthcare provider ahead of time and tell them that you have or may have COVID-19.  For medical emergencies, call 911 and notify the dispatch personnel that you have or may have COVID-19.   Cover your cough and sneezes.   Wash your hands often with soap and water for at least 20 seconds or clean your hands with an alcohol-based hand  that contains at least 60% alcohol.   As much as possible, stay in a specific room and away from other people in your home. Also, you should use a separate bathroom, if available. If you need to be around other people in or outside of the home, wear a facemask.   Avoid sharing personal items with other people in your household, like dishes, towels, and bedding   Clean all surfaces that are touched often, like counters, tabletops, and doorknobs. Use household cleaning sprays or wipes according to the label instructions.     You can also get more  information at the following websites:   Centers for Disease Control & Prevention (CDC)  What to do if you are sick with coronavirus disease 2019, https://www.cdc.gov/coronavirus/2019-ncov/downloads/sick-with-2019-nCoV-fact-sheet.pdf  Centers for Disease Control & Prevention (CDC)  10 things you can do to manage your health at home, https://www.cdc.gov/coronavirus/2019-ncov/downloads/10Things.pdf  http://www.[facility].[place].gov/topics-services/diseases-and-conditions/diseases-a-z-list/coronavirus/symptoms-treatment  http://www.[facility].[place].gov/topics-services/diseases-and-conditions/diseases-a-z-list/coronavirus  https://www.cdc.gov/coronavirus/2019-ncov/.      Future Appointments   Date Time Provider Department Center   8/22/2024  1:30 PM Azeem Johnston MD [facility]

## 2024-08-16 ENCOUNTER — HOSPITAL ENCOUNTER (OUTPATIENT)
Age: 24
Discharge: HOME | End: 2024-08-16
Payer: MEDICAID

## 2024-08-16 VITALS
OXYGEN SATURATION: 98 % | SYSTOLIC BLOOD PRESSURE: 126 MMHG | DIASTOLIC BLOOD PRESSURE: 79 MMHG | TEMPERATURE: 99 F | RESPIRATION RATE: 22 BRPM | HEART RATE: 102 BPM

## 2024-08-16 DIAGNOSIS — Z00.00 WELL ADULT EXAM: Primary | ICD-10-CM

## 2024-08-16 NOTE — ED INITIAL ASSESSMENT (HPI)
Pt was in [place] ED and dx with covid on 8/10. Pt states she did not know that she was positive. Pt states she wanted a COVID test because she was exposed at work this past Saturday.  Pt denies any symptoms.

## 2024-08-16 NOTE — ED PROVIDER NOTES
Patient Seen in: [facility]      History     Chief Complaint   Patient presents with    Covid     Stated Complaint: COVID  Subjective:   24-year-old female with asthma presents from home.  Patient is here with request for a COVID test.  Denies symptoms.  States she was exposed to COVID a week ago.  Denies active asthma symptoms.    The history is provided by the patient. No  was used.     Objective:   Past Medical History:    Asthma (HCC)    Obesity    Osteoarthritis            Past Surgical History:   Procedure Laterality Date    Other surgical history  01/09/2023    Right ankle arthroscopy with synovectomy    Tear duct system surg unlisted  2002              Social History     Socioeconomic History    Marital status: Single   Tobacco Use    Smoking status: Never    Smokeless tobacco: Never   Vaping Use    Vaping status: Never Used   Substance and Sexual Activity    Alcohol use: Not Currently     Comment: occasional    Drug use: Not Currently     Comment: occasional gummies     Social Determinants of Health      [facility] [employer] [employer]    [facility]            Review of Systems    Positive for stated complaint: Covid    Other systems are as noted in HPI.  Constitutional and vital signs reviewed.      All other systems reviewed and negative except as noted above.    Physical Exam     ED Triage Vitals [08/16/24 1759]   /79   Pulse 102   Resp 22   Temp 98.6 °F (37 °C)   Temp src Temporal   SpO2 98 %   O2 Device None (Room air)     Current:/79   Pulse 102   Temp 98.6 °F (37 °C) (Temporal)   Resp 22   LMP 07/12/2024 (Within Days)   SpO2 98%     Physical Exam  Vitals and nursing note reviewed.   Constitutional:       General: She is not in acute distress.     Appearance: Normal appearance. She is obese. She is not ill-appearing or toxic-appearing.   HENT:      Head: Normocephalic and atraumatic.      Nose: Nose normal.      Mouth/Throat:      Mouth:  Mucous membranes are moist.      Pharynx: Oropharynx is clear.   Eyes:      Pupils: Pupils are equal, round, and reactive to light.   Cardiovascular:      Rate and Rhythm: Normal rate and regular rhythm.      Pulses: Normal pulses.   Pulmonary:      Effort: Pulmonary effort is normal. No respiratory distress.      Breath sounds: Normal breath sounds.      Comments: Lungs clear.  No adventitious lung sounds.  No distress.  No hypoxia.  Pulse ox 98% ra. Which is normal    Abdominal:      General: Abdomen is flat.      Palpations: Abdomen is soft.   Musculoskeletal:         General: No signs of injury. Normal range of motion.      Cervical back: Normal range of motion and neck supple.   Skin:     General: Skin is warm and dry.      Capillary Refill: Capillary refill takes less than 2 seconds.   Neurological:      General: No focal deficit present.      Mental Status: She is alert and oriented to person, place, and time.      GCS: GCS eye subscore is 4. GCS verbal subscore is 5. GCS motor subscore is 6.   Psychiatric:         Mood and Affect: Mood normal.         Behavior: Behavior normal.         Thought Content: Thought content normal.         Judgment: Judgment normal.         ED Course   Radiology:  No results found.  Labs Reviewed - No data to display    MDM     Medical Decision Making  Differential diagnoses reflecting the complexity of care include:   COVID, well exam  Patient states she is here for asymptomatic COVID testing after recent exposure  Review of chart shows that she was seen at [facility] emergency room 8/10 and tested positive for COVID.  Patient states she was unaware that she tested positive for COVID, states she was there for jaw pain.  She also has a visit 8/13, telehealth with her primary doctor.  She told her provider at that time that she had tested positive for COVID.  Also noted is a note with the triage nurse today: \"She stated she had Telemed visit on 8-13-24, she tested positive for covid  on 8-10-24.  She wants to know how long does covid stays in your system as she has no symptom at all. Patient was informed it varies on a person how a person's body deal or fight with a virus. Last Saturday she didn't know until she was tested, her face was swollen, she went to ER on 8/10/24 and was told she has a virus. Patient was provided below CDC recommendations. She stated understanding\"   patient already known COVID-positive. Currently denies symptoms.  No further evaluation or treatment necessary      Results and plan of care discussed with the patient/family. They are in agreement with discharge. They understand to follow up with their primary doctor or the referral physician for further evaluation, especially if no improvement.  Also discussed the limitations of immediate care, patient is aware that if symptoms are worse they should go to the emergency room. Verbal and written discharge instructions were given.     Diagnostic tests and medications considered but not ordered were: COVID test  Shared decision making was done by: Patient requesting COVID test but no indication at this time  Comorbidities that add complexity to management include: Asthma obesity  External chart review was done and was noted: See notes above from 3 previous contacts/visits        Problems Addressed:  Well adult exam: acute illness or injury        Disposition and Plan     Clinical Impression:  1. Well adult exam         Disposition:  Discharge  8/16/2024  6:07 pm    Follow-up:  Jamal Perez MD  [address] [place] [address] [id number] [phone]                Medications Prescribed:  Discharge Medication List as of 8/16/2024  6:08 PM

## 2024-08-22 ENCOUNTER — OFFICE VISIT (OUTPATIENT)
Dept: OTOLARYNGOLOGY | Facility: CLINIC | Age: 24
End: 2024-08-22
Payer: MEDICAID

## 2024-08-22 DIAGNOSIS — R22.0 FACIAL SWELLING: Primary | ICD-10-CM

## 2024-08-22 DIAGNOSIS — M26.609 TMJ (TEMPOROMANDIBULAR JOINT DISORDER): ICD-10-CM

## 2024-08-22 PROCEDURE — 99213 OFFICE O/P EST LOW 20 MIN: CPT | Performed by: STUDENT IN AN ORGANIZED HEALTH CARE EDUCATION/TRAINING PROGRAM

## 2024-08-22 NOTE — PROGRESS NOTES
Anne Walter is a 24 year old female.   Chief Complaint   Patient presents with    Swelling     Patient is here due to left side facial swelling. Reports possible lymph nodes swelling. Report slight pain along the jaw line x 2 weeks.      HPI:   24-year-old presents with a history of left facial swelling.  Said that it was in front of her left ear and her cheek area and lasted for about a week.  This happened about 2 weeks ago    Current Outpatient Medications   Medication Sig Dispense Refill    IBUPROFEN 800 MG Oral Tab TAKE 1 TABLET(800 MG) BY MOUTH EVERY 6 HOURS AS NEEDED FOR PAIN 60 tablet 1    Budesonide-Formoterol Fumarate (SYMBICORT) 160-4.5 MCG/ACT Inhalation Aerosol Inhale 2 puffs into the lungs 2 (two) times daily. 10.2 g 2    albuterol (2.5 MG/3ML) 0.083% Inhalation Nebu Soln Take 3 mL (2.5 mg total) by nebulization every 4 (four) hours as needed for Wheezing or Shortness of Breath. 90 mL 2    meclizine 25 MG Oral Tab Take 1 tablet (25 mg total) by mouth 3 (three) times daily as needed. 30 tablet 0    fexofenadine-pseudoephedrine ER (ALLEGRA-D ALLERGY & CONGESTION)  MG Oral Tablet 12 Hr Take 1 tablet by mouth 2 (two) times daily.      Respiratory Therapy Supplies (NEBULIZER/TUBING/MOUTHPIECE) Does not apply Kit Use as needed with nebulizer machine and albuterol solution every 4-6 hours for wheezing or shortness of breath 1 Package 0    Spacer/Aero-Holding Chambers Does not apply Device Use with albuterol inhaler 1 Device 0      Past Medical History:    Asthma (HCC)    Obesity    Osteoarthritis      Social History:  Social History     Socioeconomic History    Marital status: Single   Tobacco Use    Smoking status: Never    Smokeless tobacco: Never   Vaping Use    Vaping status: Never Used   Substance and Sexual Activity    Alcohol use: Not Currently     Comment: occasional    Drug use: Not Currently     Comment: occasional gummies     Social Determinants of Health      [facility]      Past Surgical History:   Procedure Laterality Date    Other surgical history  01/09/2023    Right ankle arthroscopy with synovectomy    Tear duct system surg unlisted  2002         EXAM:   LMP 07/12/2024 (Within Days)     System Details   Skin Inspection - Normal.   Constitutional Overall appearance - Normal.   Head/Face Symmetric, TMJ tenderness present on the left  No parotid or facial swelling today   Eyes EOMI, PERRL   Right ear:  Canal clear, TM intact, no TIARRA   Left ear:  Canal clear, TM intact, no TIARRA   Nose: Septum midline, inferior turbinates not enlarged, nasal valves without collapse    Oral cavity/Oropharynx: No lesions or masses on inspection or palpation, tonsils symmetric    Neck: Soft without LAD, thyroid not enlarged  Voice clear/ no stridor   Other:      SCOPES AND PROCEDURES:         AUDIOGRAM AND IMAGING:         IMPRESSION:   1. Facial swelling  - US PAROTID (CPT=76536); Future    2. TMJ (temporomandibular joint disorder)       Recommendations:  -Discussed the differential including a parotitis versus a flare of a TMJ disorder or other lymphadenitis.  -Will obtain an ultrasound of the left parotid gland and the facial region  -Encouraged her to also follow-up with a dentist regarding likely TMD that could be related to the symptoms as well    The patient indicates understanding of these issues and agrees to the plan.  Longitudinal care will be provided with follow-up on imaging    Azeem Johnston MD  8/22/2024  1:45 PM

## 2024-09-06 ENCOUNTER — HOSPITAL ENCOUNTER (OUTPATIENT)
Dept: ULTRASOUND IMAGING | Age: 24
Discharge: HOME | End: 2024-09-06
Attending: STUDENT IN AN ORGANIZED HEALTH CARE EDUCATION/TRAINING PROGRAM
Payer: MEDICAID

## 2024-09-06 DIAGNOSIS — R22.0 FACIAL SWELLING: ICD-10-CM

## 2024-09-06 PROCEDURE — 76536 US EXAM OF HEAD AND NECK: CPT | Performed by: STUDENT IN AN ORGANIZED HEALTH CARE EDUCATION/TRAINING PROGRAM

## 2024-09-12 DIAGNOSIS — J45.21 MILD INTERMITTENT ASTHMA WITH EXACERBATION (HCC): ICD-10-CM

## 2024-09-17 RX ORDER — ALBUTEROL SULFATE 0.83 MG/ML
SOLUTION RESPIRATORY (INHALATION)
Qty: 90 ML | Refills: 2 | Status: SHIPPED | OUTPATIENT
Start: 2024-09-17

## 2024-09-17 NOTE — TELEPHONE ENCOUNTER
Please review. Protocol Failed; No Protocol    Requested Prescriptions   Pending Prescriptions Disp Refills    ALBUTEROL (2.5 MG/3ML) 0.083% Inhalation Nebu Soln [Pharmacy Med Name: ALBUTEROL 0.083%(2.5MG/3ML) 30X3ML] 90 mL 2     Sig: TAKE 3 ML BY NEBULIZATION EVERY 4 HOURS AS NEEDED FOR WHEEZING AND SHORTNESS OF BREATH       Asthma & COPD Medication Protocol Failed - 9/12/2024  6:10 PM        Failed - Asthma Action Score greater than or equal to 20        Failed - AAP/ACT given in last 12 months     No data recorded  No data recorded  No data recorded  18          Passed - Appointment in past 6 or next 3 months      Recent Outpatient Visits              3 weeks ago Facial swelling    [facility], Azeem Ward MD    Office Visit    1 month ago Lymphadenopathy of head and neck    [facility], Carla Cortes APRN    Telemedicine    5 months ago TMJ (temporomandibular joint disorder)    [facility]    [facility]    5 months ago Mild intermittent asthma without complication (HCC)    [facility], Carla Cortes APRN    Office Visit    6 months ago Mild intermittent asthma with exacerbation (HCC)    [facility], Carla Cortes APRN    Telemedicine          Future Appointments         Provider Department Appt Notes    [facility]                           Future Appointments         Provider Department Appt Notes    In 1 week [facility]          Recent Outpatient Visits              3 weeks ago Facial swelling    [facility], Azeem Ward MD    Office Visit    1 month ago Lymphadenopathy of head and neck    [place]  [facility] [address], Carla Cortes APRN    Telemedicine    5 months ago TMJ (temporomandibular joint disorder)    [facility], Azeem Ward MD    Office Visit    5 months ago Mild intermittent asthma without complication (HCC)    [facility] [address], Carla Cortes APRN    Office Visit    6 months ago Mild intermittent asthma with exacerbation (HCC)    [facility]    Telemedicine

## 2024-09-25 ENCOUNTER — OFFICE VISIT (OUTPATIENT)
Dept: ORTHOPEDICS CLINIC | Facility: CLINIC | Age: 24
End: 2024-09-25

## 2024-09-25 ENCOUNTER — TELEPHONE (OUTPATIENT)
Dept: ORTHOPEDICS CLINIC | Facility: CLINIC | Age: 24
End: 2024-09-25

## 2024-09-25 ENCOUNTER — HOSPITAL ENCOUNTER (OUTPATIENT)
Dept: GENERAL RADIOLOGY | Age: 24
Discharge: HOME | End: 2024-09-25
Attending: ORTHOPAEDIC SURGERY
Payer: MEDICAID

## 2024-09-25 DIAGNOSIS — M65.9 SYNOVITIS OF RIGHT ANKLE: ICD-10-CM

## 2024-09-25 DIAGNOSIS — M84.374A STRESS FRACTURE OF NAVICULAR BONE OF RIGHT FOOT: ICD-10-CM

## 2024-09-25 DIAGNOSIS — E66.01 MORBID OBESITY WITH BMI OF 60.0-69.9, ADULT (HCC): ICD-10-CM

## 2024-09-25 DIAGNOSIS — S93.491D SPRAIN OF ANTERIOR TALOFIBULAR LIGAMENT OF RIGHT ANKLE, SUBSEQUENT ENCOUNTER: ICD-10-CM

## 2024-09-25 DIAGNOSIS — S93.491D SPRAIN OF ANTERIOR TALOFIBULAR LIGAMENT OF RIGHT ANKLE, SUBSEQUENT ENCOUNTER: Primary | ICD-10-CM

## 2024-09-25 DIAGNOSIS — M62.461 CONTRACTURE OF MUSCLE OF RIGHT LOWER LEG: ICD-10-CM

## 2024-09-25 PROCEDURE — 73630 X-RAY EXAM OF FOOT: CPT | Performed by: ORTHOPAEDIC SURGERY

## 2024-09-25 PROCEDURE — 73610 X-RAY EXAM OF ANKLE: CPT | Performed by: ORTHOPAEDIC SURGERY

## 2024-09-25 PROCEDURE — 99214 OFFICE O/P EST MOD 30 MIN: CPT | Performed by: ORTHOPAEDIC SURGERY

## 2024-09-25 NOTE — TELEPHONE ENCOUNTER
Per patient needs note for work stating she was seen and treated today, note can be put on mychart, states she needs note this afternoon. Please advise thank you.

## 2024-09-25 NOTE — PROGRESS NOTES
Chief Complaint   Patient presents with    Follow - Up     Right ankle pain Follow up-right ankle secondary Broström reconstruction and subchondroplasty of the navicular on 1/9/2023. Patient rates pain today 7/10. Patient states she a wheelchair went over her foot 3 months ago while at work and felt a pop at the time. She states her employer sent her for evaluation and diagnosed a sprain.     HPI  Pt has worsening pain in her ankle. 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 She plans on going back on a diet in October with her mom. She does not like wearing the brace as it causes some bruising of her ankle.    Physical Exam  Gen: NAD, alert, answering questions appropriately  HEENT: NCAT, EOMI  Lungs: NL breathing, symmetrical lung expansion  Abd: Soft, ND  Extremities:   Right ankle with neutral alignment, negative anterior drawer DF/PF/EHL intact, SILT, cap refill brisk  TTP anterior ankle and mild TTP navicular. Limited ankle DF to neutral with knee extension.    Imaging seen and reviewed by me:   3 views of the right foot and ankle with stable tunnel sites along talus and calcaneus.  Neutral alignment of tibiotalar joint with no OCD lesion.  Navicular with increased sclerosis consistent with healing of the prior fracture.Dorsal exostosis at the talonavicular joint    Assessment/Plan: 24 year old year old female presenting with  1. Sprain of anterior talofibular ligament of right ankle, subsequent encounter    -Acute on chronic issue    - XR ANKLE WEIGHTBEARING (3 VIEWS), RIGHT (CPT=73610); Future  - shoewear modifications including [place] shoes. She would benefit from PT and home exercises discussed today.    2. Contracture of muscle of right lower leg  Stretching of calf and hamstring exercises.     3. Synovitis of right ankle  Range of motion exercises    4. Stress fracture of navicular bone of right foot  Stable alignment of navicular  - XR FOOT WEIGHTBEARING (3 VIEWS), RIGHT    (CPT=73630); Future     5. Morbid obesity  Pt recently lost 10 lbs but recently fell back into old habits. Recommended nutritionist and weight and consider medical management. We discussed weight loss strategies and nutrition. I explained her elevated BMI is likely contributing to chronic ankle/foot pain.    Kamla Orellana,   09/25/24

## 2024-09-25 NOTE — PATIENT INSTRUCTIONS
X-rays look good. The cyst in the navicular looks healed. It looks like you have signs for early arthritis in your foot. I think physical therapy and weight loss will help the pain.

## 2024-10-02 RX ORDER — IBUPROFEN 800 MG/1
800 TABLET, FILM COATED ORAL EVERY 6 HOURS PRN
Qty: 60 TABLET | Refills: 1 | Status: SHIPPED | OUTPATIENT
Start: 2024-10-02

## 2024-10-11 RX ORDER — IBUPROFEN 800 MG/1
800 TABLET, FILM COATED ORAL EVERY 6 HOURS PRN
Qty: 60 TABLET | Refills: 1 | OUTPATIENT
Start: 2024-10-11

## 2025-01-02 ENCOUNTER — HOSPITAL ENCOUNTER (OUTPATIENT)
Age: 25
Discharge: HOME | End: 2025-01-02
Payer: COMMERCIAL

## 2025-01-02 ENCOUNTER — APPOINTMENT (OUTPATIENT)
Dept: GENERAL RADIOLOGY | Age: 25
End: 2025-01-02
Attending: NURSE PRACTITIONER
Payer: COMMERCIAL

## 2025-01-02 VITALS
DIASTOLIC BLOOD PRESSURE: 77 MMHG | TEMPERATURE: 98 F | RESPIRATION RATE: 19 BRPM | HEART RATE: 94 BPM | OXYGEN SATURATION: 100 % | SYSTOLIC BLOOD PRESSURE: 130 MMHG

## 2025-01-02 DIAGNOSIS — J98.8 VIRAL RESPIRATORY ILLNESS: Primary | ICD-10-CM

## 2025-01-02 DIAGNOSIS — B97.89 VIRAL RESPIRATORY ILLNESS: Primary | ICD-10-CM

## 2025-01-02 DIAGNOSIS — J98.01 BRONCHOSPASM: ICD-10-CM

## 2025-01-02 PROCEDURE — 71046 X-RAY EXAM CHEST 2 VIEWS: CPT | Performed by: NURSE PRACTITIONER

## 2025-01-02 PROCEDURE — 87502 INFLUENZA DNA AMP PROBE: CPT | Performed by: NURSE PRACTITIONER

## 2025-01-02 PROCEDURE — U0002 COVID-19 LAB TEST NON-CDC: HCPCS | Performed by: NURSE PRACTITIONER

## 2025-01-02 PROCEDURE — 99214 OFFICE O/P EST MOD 30 MIN: CPT | Performed by: NURSE PRACTITIONER

## 2025-01-02 PROCEDURE — 94640 AIRWAY INHALATION TREATMENT: CPT | Performed by: EMERGENCY MEDICINE

## 2025-01-02 RX ORDER — ALBUTEROL SULFATE 90 UG/1
2 INHALANT RESPIRATORY (INHALATION) EVERY 4 HOURS PRN
Qty: 1 EACH | Refills: 0 | Status: SHIPPED | OUTPATIENT
Start: 2025-01-02 | End: 2025-02-01

## 2025-01-02 RX ORDER — PREDNISONE 20 MG/1
40 TABLET ORAL ONCE
Status: COMPLETED | OUTPATIENT
Start: 2025-01-02 | End: 2025-01-02

## 2025-01-02 RX ORDER — PREDNISONE 20 MG/1
40 TABLET ORAL DAILY
Qty: 8 TABLET | Refills: 0 | Status: SHIPPED | OUTPATIENT
Start: 2025-01-02 | End: 2025-01-06

## 2025-01-02 RX ORDER — IPRATROPIUM BROMIDE AND ALBUTEROL SULFATE 2.5; .5 MG/3ML; MG/3ML
3 SOLUTION RESPIRATORY (INHALATION) ONCE
Status: COMPLETED | OUTPATIENT
Start: 2025-01-02 | End: 2025-01-02

## 2025-01-02 RX ORDER — BENZONATATE 100 MG/1
100 CAPSULE ORAL 3 TIMES DAILY PRN
Qty: 20 CAPSULE | Refills: 0 | Status: SHIPPED | OUTPATIENT
Start: 2025-01-02 | End: 2025-01-09

## 2025-01-02 RX ORDER — ALBUTEROL SULFATE 0.83 MG/ML
2.5 SOLUTION RESPIRATORY (INHALATION) EVERY 4 HOURS PRN
Qty: 30 EACH | Refills: 0 | Status: SHIPPED | OUTPATIENT
Start: 2025-01-02 | End: 2025-02-01

## 2025-01-03 NOTE — ED PROVIDER NOTES
Patient Seen in: [facility]    History   CC: cough  HPI: Anne Walter 24 year old female  who presents c/o cough, congestion for the last 1 week however states now her asthma is flaring.  Last asthma flare was beginning of December.  Has been using her albuterol neb or inhaler every 4 hours.  Denies hemoptysis, any complaints of pain, GI signs/symptoms, rash or fever.    Past Medical History:    Asthma (HCC)    Obesity    Osteoarthritis       Past Surgical History:   Procedure Laterality Date    Other surgical history  01/09/2023    Right ankle arthroscopy with synovectomy    Tear duct system surg unlisted  2002       Family History   Problem Relation Age of Onset    No Known Problems Father     No Known Problems Mother     Diabetes Other     Cancer Neg     Heart Disorder Neg     Hypertension Neg     Breast Cancer Neg     Ovarian Cancer Neg     Uterine Cancer Neg     Colon Cancer Neg        Social History     Socioeconomic History    Marital status: Single   Tobacco Use    Smoking status: Never    Smokeless tobacco: Never   Vaping Use    Vaping status: Never Used   Substance and Sexual Activity    Alcohol use: Not Currently     Comment: occasional    Drug use: Not Currently     Comment: occasional gummies     Social Drivers of Health      [facility] [employer] [employer]    [facility]       ROS:  Systems reviewed: All pertinent positives noted in HPI. Unless otherwise noted, additional systems reviewed are negative.   Vital signs reviewed.    Positive for stated complaint: Cough; Asthma  Other systems are as noted in HPI.  Constitutional and vital signs reviewed.      All other systems reviewed and negative except as noted above.    PSFH elements reviewed from today and agreed except as otherwise stated in HPI.             Constitutional and vital signs reviewed.        Physical Exam     ED Triage Vitals [01/02/25 1819]   /77   Pulse 94   Resp 19   Temp 97.9 °F (36.6 °C)   Temp src  Oral   SpO2 100 %   O2 Device None (Room air)       Current:/77   Pulse 94   Temp 97.9 °F (36.6 °C) (Oral)   Resp 19   LMP 07/12/2024 (Within Days)   SpO2 100%         PE:  General - Appears well, non-toxic and in NAD  Head - Appears symmetrical without deformity/swelling cranium, scalp, or facial bones  Eyes - sclera not injected, no discharge noted, no periorbital edema  ENT - EAC bilaterally without discharge, TM pearly grey with COL visualized appropriately bilaterally.   no nasal drainage noted in nares bilat, no cobblestoning to post. Pharynx.   Oropharynx clear, posterior pharynx is without erythema and without tonsilar enlargement or exudate, uvula midline, +gag, voice is clear. No trismus  Neck - no significant adenopathy, supple with trachea midline  Resp - Lung sounds clear bilaterally and wob unlabored, good aeration with equal, even expansion bilaterally   CV - RRR  Skin - no rashes or petechiae noted, pink warm and dry throughout, mmm, cap refill <2seconds  Neuro - A&O x4, steady gait  MSK - makes purposeful movements of all extremities  Psych - Interactive and appropriate      ED Course     Labs Reviewed   RAPID SARS-COV-2 BY PCR - Normal   POCT FLU TEST - Normal    Narrative:     This assay is a rapid molecular in vitro test utilizing nucleic acid amplification of influenza A and B viral RNA.       [facility]     XR CHEST PA + LAT CHEST (CPT=71046)   Final Result   PROCEDURE: XR CHEST PA + LAT CHEST (CPT=71046)       COMPARISON: [facility], XR CHEST PA + LAT    CHEST (CPT=71046), 3/06/2024, 4:33 PM.       INDICATIONS: Cough x 1 week.       TECHNIQUE:   Two views.         FINDINGS:    CARDIAC/VASC: No cardiac silhouette abnormality or cardiomegaly.     Unremarkable pulmonary vasculature.     MEDIAST/SAMANTHA: No visible mass or adenopathy.    LUNGS/PLEURA: No significant pulmonary parenchymal abnormalities.  No    effusion or pleural thickening.     BONES: No fracture  or visible bony lesion.    OTHER: Negative.                     =====   CONCLUSION:        Negative for radiographically evident acute intrathoracic process.             el-remote       Dictated by (CST): Shashank Rodriguez MD on 1/02/2025 at 6:38 PM        Finalized by (CST): Shashank Rodriguez MD on 1/02/2025 at 6:38 PM                   DDx: Bronchospasm, PNA, influenza, COVID-19, unspecified viral illness     Influenza negative.  Rapid COVID PCR negative.  Chest x-ray as noted above without acute process and independently reviewed by this provider.  General viral illness and secondary bronchospasm instructions reviewed, rest, hydration instructions, Tylenol or Motrin as needed for discomfort or fever, humidifier etc. as well as follow-up and return/ED precautions reviewed.  Patient's first dose of prednisone given in the immediate care for bronchospasm/asthma exacerbation reviewed, no need to initiate prescription until tomorrow as she had her first dose here in the immediate care today.  Patient is requesting refills on nebulized albuterol and inhaled albuterol as well as cough suppressant as directed and indications/precautions on use reviewed.  Patient is historian and demonstrates understanding of all instruction and agrees with plan of care.      Disposition and Plan     Clinical Impression:  1. Viral respiratory illness    2. Bronchospasm        Disposition:  Discharge    Follow-up:  Jamal Perez MD  [address] [place] [id number] [phone]    Go in 1 week  As needed      Medications Prescribed:  Discharge Medication List as of 1/2/2025  7:02 PM

## 2025-01-03 NOTE — DISCHARGE INSTRUCTIONS
Do not start the prednisone tomorrow has you had your first dose here in the IC today. Make sure to stay well hydrated with clear fluids. You can use Tylenol or Motrin every 6 hours to control fever or discomfort. You can use both Tylenol and Motrin, but alternate them so that you're getting one every 3 hours. Warm salt water gargles, throat lozenges, and warmed beverages can be additionally helpful for a sore throat. Using a humidifier in the bedroom at night, and sleeping propped up on pillows/somewhat of an incline can also be helpful. Cover your cough and wash your hands frequently to prevent the spread of infection. Follow up with your primary care provider in the next 2-3 days. Seek additional care in the ER for fever that is not controlled with Tylenol and Motrin, difficulty breathing or shortness of breath, chest pain, or any new/worsening symptoms.

## 2025-01-06 ENCOUNTER — HOSPITAL ENCOUNTER (OUTPATIENT)
Age: 25
Discharge: HOME | End: 2025-01-06
Payer: COMMERCIAL

## 2025-01-06 ENCOUNTER — APPOINTMENT (OUTPATIENT)
Dept: GENERAL RADIOLOGY | Age: 25
End: 2025-01-06
Attending: PHYSICIAN ASSISTANT
Payer: COMMERCIAL

## 2025-01-06 VITALS
RESPIRATION RATE: 20 BRPM | HEART RATE: 69 BPM | DIASTOLIC BLOOD PRESSURE: 81 MMHG | SYSTOLIC BLOOD PRESSURE: 132 MMHG | OXYGEN SATURATION: 98 % | TEMPERATURE: 98 F

## 2025-01-06 DIAGNOSIS — R05.1 ACUTE COUGH: Primary | ICD-10-CM

## 2025-01-06 DIAGNOSIS — J45.909 ACUTE ASTHMA (HCC): ICD-10-CM

## 2025-01-06 PROCEDURE — 71046 X-RAY EXAM CHEST 2 VIEWS: CPT | Performed by: PHYSICIAN ASSISTANT

## 2025-01-06 PROCEDURE — 99213 OFFICE O/P EST LOW 20 MIN: CPT | Performed by: PHYSICIAN ASSISTANT

## 2025-01-06 RX ORDER — CODEINE PHOSPHATE AND GUAIFENESIN 10; 100 MG/5ML; MG/5ML
SOLUTION ORAL 3 TIMES DAILY PRN
Qty: 50 ML | Refills: 0 | Status: SHIPPED | OUTPATIENT
Start: 2025-01-06

## 2025-01-06 RX ORDER — PREDNISONE 10 MG/1
TABLET ORAL
Qty: 32 TABLET | Refills: 0 | Status: SHIPPED | OUTPATIENT
Start: 2025-01-06 | End: 2025-01-17

## 2025-01-06 RX ORDER — ALBUTEROL SULFATE 90 UG/1
2 INHALANT RESPIRATORY (INHALATION) EVERY 4 HOURS PRN
Qty: 1 EACH | Refills: 0 | Status: SHIPPED | OUTPATIENT
Start: 2025-01-06 | End: 2025-02-05

## 2025-01-06 NOTE — ED PROVIDER NOTES
Patient Seen in: [facility]    History     Chief Complaint   Patient presents with    Cough     Stated Complaint: Cough    HPI    Anne Walter is a 24 year old female presents with chief complaint of cough.  Onset 11 days ago.  Patient reports associated intermittent wheeze.  Patient reports history of acute asthma exacerbation and states her symptoms are similar.  Patient states she has been taking previously prescribed medications as directed without improvement of symptoms.  Patient denies fever, chills, earache, nasal drainage, sore throat, abdominal pain, nausea, vomiting, diarrhea, constipation, dysuria, hematuria, flank pain, rash, neck pain, neck swelling, restricted neck movement, dyspnea, hemoptysis, extremity pain, extremity swelling.      Past Medical History:    Asthma (HCC)    Obesity    Osteoarthritis       Past Surgical History:   Procedure Laterality Date    Other surgical history  01/09/2023    Right ankle arthroscopy with synovectomy    Tear duct system surg unlisted  2002            Family History   Problem Relation Age of Onset    No Known Problems Father     No Known Problems Mother     Diabetes Other     Cancer Neg     Heart Disorder Neg     Hypertension Neg     Breast Cancer Neg     Ovarian Cancer Neg     Uterine Cancer Neg     Colon Cancer Neg        Social History     Socioeconomic History    Marital status: Single   Tobacco Use    Smoking status: Never    Smokeless tobacco: Never   Vaping Use    Vaping status: Never Used   Substance and Sexual Activity    Alcohol use: Not Currently     Comment: occasional    Drug use: Not Currently     Comment: occasional gummies     Social Drivers of Health      Received from [employer], [employer]    [facility]       Review of Systems    Positive for stated complaint: Cough  Other systems are as noted in HPI.  Constitutional and vital signs reviewed.      All other systems reviewed and negative except as noted above.    [facility]  elements reviewed from today and agreed except as otherwise stated in HPI.    Physical Exam     ED Triage Vitals [01/06/25 0901]   /81   Pulse 69   Resp 20   Temp 98 °F (36.7 °C)   Temp src Oral   SpO2 98 %   O2 Device None (Room air)       Current:/81   Pulse 69   Temp 98 °F (36.7 °C) (Oral)   Resp 20   LMP  (Within Days)   SpO2 98%     PULSE OX within normal limits on room air as interpreted by this provider.     Constitutional: The patient is cooperative. Appears well-developed and well-nourished.  No acute distress.  Psychological: Alert, No abnormalities of mood, affect.  Head: Normocephalic/atraumatic.   Eyes: Pupils are equal round reactive to light.  Conjunctiva are within normal limits.  ENT: Pharynx noninjected.  Tonsils within normal size limits bilaterally.  No tonsillar exudates.  TMs within normal limits bilaterally.  Mucous membranes moist.  Neck: The neck is supple.  No meningeal signs.  Chest: There is no tenderness to the chest wall.  No CVA tenderness bilaterally.  Respiratory: Respiratory effort was normal.  Decreased breath sounds bilaterally.  There is no stridor.  Air entry is equal.  Cardiovascular: Regular rate and rhythm.  Capillary refill is brisk.    Lymphatic: No gross lymphadenopathy noted.  Musculoskeletal: Musculoskeletal system is grossly intact.  There is no obvious deformity.  Neurological: Gross motor movement is intact in all 4 extremities.  Patient exhibits normal speech.  Skin: Skin is normal to inspection.  Warm and dry.  No obvious bruising.  No obvious rash.        ED Course   Labs Reviewed - No data to display    MDM     Differential diagnosis including but not limited to URI, bronchitis, pneumonia, acute asthma    Radiology findings: XR CHEST PA + LAT CHEST (CPT=71046)    Result Date: 1/6/2025  CONCLUSION:   Negative for radiographically evident acute intrathoracic process.    elm-remote  Dictated by (CST): Shashank Rodriguez MD on 1/06/2025 at 9:27 AM      Finalized by (CST): Shashank Rodriguez MD on 1/06/2025 at 9:28 AM           Chest x-ray images independently reviewed by this provider-no pneumonia.    Physical exam remained stable over serial reexaminations as previously documented.  Results reviewed with patient.    I have given the patient instructions regarding their diagnoses, expectations, follow up, and ER precautions. I explained to the patient that emergent conditions may arise and to go to the ER for new, worsening or any persistent conditions. I've explained the importance of following up with their doctor as instructed. The patient verbalized understanding of the discharge instructions and plan.      Disposition and Plan     Clinical Impression:  1. Acute cough    2. Acute asthma (HCC)        Disposition:  Discharge    Follow-up:  Jamal Perez MD  [address] [place], [place] [id number] [phone]    Call in 1 day  For follow-up      Medications Prescribed:  Current Discharge Medication List        START taking these medications    Details   !! albuterol 108 (90 Base) MCG/ACT Inhalation Aero Soln Inhale 2 puffs into the lungs every 4 (four) hours as needed for Wheezing.  Qty: 1 each, Refills: 0      !! Spacer/Aero-Holding Chambers Does not apply Device Use with albuterol inhaler  Qty: 1 each, Refills: 0      guaiFENesin-codeine 100-10 MG/5ML Oral Solution Take 2.5-5 mL by mouth 3 (three) times daily as needed for cough.  Qty: 50 mL, Refills: 0    Comments: Shirley Gupta MD  Associated Diagnoses: Acute cough      !! predniSONE 10 MG Oral Tab Take 4 tablets (40 mg total) by mouth daily with food for 5 days, THEN 3 tablets (30 mg total) daily with food for 2 days, THEN 2 tablets (20 mg total) daily with food for 2 days, THEN 1 tablet (10 mg total) daily with food for 2 days.  Qty: 32 tablet, Refills: 0       !! - Potential duplicate medications found. Please discuss with provider.

## 2025-01-07 ENCOUNTER — OFFICE VISIT (OUTPATIENT)
Dept: FAMILY MEDICINE CLINIC | Facility: CLINIC | Age: 25
End: 2025-01-07

## 2025-01-07 VITALS
HEIGHT: 65 IN | BODY MASS INDEX: 48.82 KG/M2 | HEART RATE: 92 BPM | SYSTOLIC BLOOD PRESSURE: 139 MMHG | DIASTOLIC BLOOD PRESSURE: 78 MMHG | TEMPERATURE: 97 F | WEIGHT: 293 LBS

## 2025-01-07 DIAGNOSIS — J06.9 URI WITH COUGH AND CONGESTION: Primary | ICD-10-CM

## 2025-01-07 PROCEDURE — 99213 OFFICE O/P EST LOW 20 MIN: CPT | Performed by: NURSE PRACTITIONER

## 2025-01-08 NOTE — PROGRESS NOTES
HPI    Patient presents for ER follow-up.  Had bronchitis on 12/11 and cough has lingered since.  Seen again in  yesterday for cough and loss of voice.  Had chest x-ray completed which was negative.  Negative for flu, COVID and RSV, as well.  Is taking Cheratussin as needed for cough which helps her to sleep at night.  Started oral prednisone, as well.    Review of Systems   HENT:  Positive for voice change.    Respiratory:  Positive for cough.    All other systems reviewed and are negative.       Vitals:    01/07/25 1813   BP: 139/78   Pulse: 92   Temp: 97 °F (36.1 °C)   Weight: (!) 414 lb (187.8 kg)   Height: 5' 5\" (1.651 m)     Body mass index is 68.89 kg/m².    Health Maintenance   Topic Date Due    Pap Smear  Never done    COVID-19 Vaccine (3 - 2024-25 season) 09/01/2024    Influenza Vaccine (1) 10/01/2024    Annual Depression Screening  01/01/2025    Annual Physical  01/31/2025    Pneumococcal Vaccine: Birth to 64yrs (1 of 2 - PCV) 01/31/2025 (Originally 8/2/2004)    DTaP,Tdap,and Td Vaccines (7 - Td or Tdap) 01/31/2025 (Originally 4/29/2020)    Asthma Control Test  01/31/2025    HPV Vaccines  Completed       Past Medical History:    Asthma (HCC)    Obesity    Osteoarthritis       .  Past Surgical History:   Procedure Laterality Date    Other surgical history  01/09/2023    Right ankle arthroscopy with synovectomy    Tear duct system surg unlisted  2002       Family History   Problem Relation Age of Onset    No Known Problems Father     No Known Problems Mother     Diabetes Other     Cancer Neg     Heart Disorder Neg     Hypertension Neg     Breast Cancer Neg     Ovarian Cancer Neg     Uterine Cancer Neg     Colon Cancer Neg        Social History     Socioeconomic History    Marital status: Single     Spouse name: Not on file    Number of children: Not on file    Years of education: Not on file    Highest education level: Not on file   Occupational History    Not on file   Tobacco Use    Smoking status:  Never    Smokeless tobacco: Never   Vaping Use    Vaping status: Never Used   Substance and Sexual Activity    Alcohol use: Not Currently     Comment: occasional    Drug use: Not Currently     Comment: occasional gummies    Sexual activity: Not on file   Other Topics Concern    Not on file   Social History Narrative    Not on file     Social Drivers of Health     Financial Resource Strain: Not on file   Food Insecurity: No Food Insecurity (1/7/2025)    NCSS - Food Insecurity     Worried About Running Out of Food in the Last Year: No     Ran Out of Food in the Last Year: No   Transportation Needs: No Transportation Needs (1/7/2025)    NCSS - Transportation     Lack of Transportation: No   Physical Activity: Not on file   Stress: Not on file   Social Connections: Not on file   Housing Stability: Not At Risk (1/7/2025)    NCSS - Housing/Utilities     Has Housing: Yes     Worried About Losing Housing: No     Unable to Get Utilities: No       Current Outpatient Medications   Medication Sig Dispense Refill    albuterol 108 (90 Base) MCG/ACT Inhalation Aero Soln Inhale 2 puffs into the lungs every 4 (four) hours as needed for Wheezing. 1 each 0    guaiFENesin-codeine 100-10 MG/5ML Oral Solution Take 2.5-5 mL by mouth 3 (three) times daily as needed for cough. 50 mL 0    predniSONE 10 MG Oral Tab Take 4 tablets (40 mg total) by mouth daily with food for 5 days, THEN 3 tablets (30 mg total) daily with food for 2 days, THEN 2 tablets (20 mg total) daily with food for 2 days, THEN 1 tablet (10 mg total) daily with food for 2 days. 32 tablet 0    albuterol 108 (90 Base) MCG/ACT Inhalation Aero Soln Inhale 2 puffs into the lungs every 4 (four) hours as needed for Wheezing or Shortness of Breath (spastic cough). 1 each 0    albuterol (2.5 MG/3ML) 0.083% Inhalation Nebu Soln Take 3 mL (2.5 mg total) by nebulization every 4 (four) hours as needed for Wheezing or Shortness of Breath (spastic cough). 30 each 0    ibuprofen 800 MG  Oral Tab Take 1 tablet (800 mg total) by mouth every 6 (six) hours as needed for Pain. 60 tablet 1    albuterol (2.5 MG/3ML) 0.083% Inhalation Nebu Soln TAKE 3 ML BY NEBULIZATION EVERY 4 HOURS AS NEEDED FOR WHEEZING AND SHORTNESS OF BREATH 90 mL 2    Budesonide-Formoterol Fumarate (SYMBICORT) 160-4.5 MCG/ACT Inhalation Aerosol Inhale 2 puffs into the lungs 2 (two) times daily. 10.2 g 2    Respiratory Therapy Supplies (NEBULIZER/TUBING/MOUTHPIECE) Does not apply Kit Use as needed with nebulizer machine and albuterol solution every 4-6 hours for wheezing or shortness of breath 1 Package 0    Spacer/Aero-Holding Chambers Does not apply Device Use with albuterol inhaler 1 Device 0    Spacer/Aero-Holding Chambers Does not apply Device Use with albuterol inhaler 1 each 0    benzonatate 100 MG Oral Cap Take 1 capsule (100 mg total) by mouth 3 (three) times daily as needed for cough. (Patient not taking: Reported on 1/7/2025) 20 capsule 0    meclizine 25 MG Oral Tab Take 1 tablet (25 mg total) by mouth 3 (three) times daily as needed. (Patient not taking: Reported on 1/7/2025) 30 tablet 0    fexofenadine-pseudoephedrine ER (ALLEGRA-D ALLERGY & CONGESTION)  MG Oral Tablet 12 Hr Take 1 tablet by mouth 2 (two) times daily. (Patient not taking: Reported on 1/7/2025)         Allergies:  Allergies[1]    Physical Exam  Vitals and nursing note reviewed.   Constitutional:       General: She is not in acute distress.     Appearance: Normal appearance.   Cardiovascular:      Rate and Rhythm: Normal rate and regular rhythm.      Heart sounds: Normal heart sounds.   Pulmonary:      Effort: Pulmonary effort is normal. No respiratory distress.      Breath sounds: Normal breath sounds. No stridor. No wheezing, rhonchi or rales.   Chest:      Chest wall: No tenderness.   Neurological:      Mental Status: She is alert and oriented to person, place, and time.          Assessment and Plan:   Problem List Items Addressed This Visit     None  Visit Diagnoses       URI with cough and congestion    -  Primary           Continue prednisone as ordered.  Cheratussin as needed for cough.  Supportive care discussed.  Follow-up as needed.    Discussed plan of care with patient and patient is in agreement.  All questions answered. Patient to call with questions or concerns.    Encouraged to sign up for My Chart if not already registered.        [1] No Known Allergies

## 2025-01-27 ENCOUNTER — HOSPITAL ENCOUNTER (OUTPATIENT)
Age: 25
Discharge: HOME | End: 2025-01-27
Payer: COMMERCIAL

## 2025-01-27 VITALS
OXYGEN SATURATION: 97 % | HEART RATE: 90 BPM | SYSTOLIC BLOOD PRESSURE: 127 MMHG | DIASTOLIC BLOOD PRESSURE: 68 MMHG | RESPIRATION RATE: 18 BRPM | TEMPERATURE: 98 F

## 2025-01-27 DIAGNOSIS — R05.1 ACUTE COUGH: ICD-10-CM

## 2025-01-27 DIAGNOSIS — J45.21 MILD INTERMITTENT ASTHMA WITH EXACERBATION (HCC): Primary | ICD-10-CM

## 2025-01-27 PROCEDURE — U0002 COVID-19 LAB TEST NON-CDC: HCPCS | Performed by: NURSE PRACTITIONER

## 2025-01-27 PROCEDURE — 99213 OFFICE O/P EST LOW 20 MIN: CPT | Performed by: NURSE PRACTITIONER

## 2025-01-27 PROCEDURE — 87502 INFLUENZA DNA AMP PROBE: CPT | Performed by: NURSE PRACTITIONER

## 2025-01-27 RX ORDER — BUDESONIDE AND FORMOTEROL FUMARATE DIHYDRATE 160; 4.5 UG/1; UG/1
2 AEROSOL RESPIRATORY (INHALATION) 2 TIMES DAILY
Qty: 10.2 G | Refills: 2 | Status: SHIPPED | OUTPATIENT
Start: 2025-01-27

## 2025-01-27 NOTE — ED PROVIDER NOTES
Patient Seen in: [facility]      History     Chief Complaint   Patient presents with    Cough/URI     Stated Complaint: COUGH, SHORTNESS OF BREATH  Subjective:   HPI    This is a 24-year-old female with a history of asthma, obesity who presents with cough.  Patient states cough over the last 4 days.  Denies any chest pain or shortness of breath.  No fever or chills.  States she has not had her Symbicort inhaler she has just been using her albuterol.  She ran out of Symbicort.  She was seen on 1/2 here and then 1/6 where she had 2 negative x-rays.  She states she then followed up with her doctor we discussed it was most likely viral in etiology.  Patient states she also was given a short course of prednisone at that time with resolution.  Would like viral testing here as she does live with immunocompromised patients    Objective:   No pertinent past medical history.          No pertinent past surgical history.            No pertinent social history.          Review of Systems   All other systems reviewed and are negative.      Positive for stated complaint: Cough/URI    Other systems are as noted in HPI.  Constitutional and vital signs reviewed.      All other systems reviewed and negative except as noted above.    Physical Exam     ED Triage Vitals [01/27/25 1640]   /68   Pulse 90   Resp 18   Temp 98.3 °F (36.8 °C)   Temp src Oral   SpO2 97 %   O2 Device None (Room air)     Current:/68   Pulse 90   Temp 98.3 °F (36.8 °C) (Oral)   Resp 18   LMP 01/07/2025 (Within Days)   SpO2 97%     Physical Exam  Vitals and nursing note reviewed.   Constitutional:       General: She is awake. She is not in acute distress.     Appearance: Normal appearance. She is not ill-appearing, toxic-appearing or diaphoretic.   HENT:      Head: Normocephalic and atraumatic.      Right Ear: Tympanic membrane, ear canal and external ear normal.      Left Ear: Tympanic membrane, ear canal and external ear normal.       Nose: Rhinorrhea present.      Mouth/Throat:      Mouth: Mucous membranes are moist.      Pharynx: Oropharynx is clear. Uvula midline.   Eyes:      General: Lids are normal.      Extraocular Movements: Extraocular movements intact.      Conjunctiva/sclera: Conjunctivae normal.      Pupils: Pupils are equal, round, and reactive to light.   Cardiovascular:      Rate and Rhythm: Normal rate and regular rhythm.      Pulses: Normal pulses.      Heart sounds: Normal heart sounds.   Pulmonary:      Effort: Pulmonary effort is normal.      Breath sounds: Normal breath sounds and air entry. No stridor, decreased air movement or transmitted upper airway sounds.   Skin:     General: Skin is warm and dry.      Capillary Refill: Capillary refill takes less than 2 seconds.   Neurological:      General: No focal deficit present.      Mental Status: She is alert and oriented to person, place, and time.   Psychiatric:         Mood and Affect: Mood normal.         Behavior: Behavior normal. Behavior is cooperative.         Thought Content: Thought content normal.         Judgment: Judgment normal.         ED Course   Influenza negative, COVID-negative.  No results found.  Labs Reviewed   POCT FLU TEST - Normal    Narrative:     This assay is a rapid molecular in vitro test utilizing nucleic acid amplification of influenza A and B viral RNA.   RAPID SARS-COV-2 BY PCR - Normal       MDM     Medical Decision Making  Differential diagnoses reflecting the complexity of care include but are not limited to influenza, upper respiratory infection, pneumonia, COVID-19, asthma exacerbation.    Comorbidities that add complexity to management include: Obesity, asthma  History obtained by an independent source was from: N/A  Discussions of management was done with: N/A  My independent interpretations of studies include: Influenza negative, COVID-negative.  Shared decision making was done by: Patient and myself  Patient is well appearing, non-toxic  and in no acute distress.  Vital signs are stable.  I discussed with patient that the influenza and COVID here were negative.  Discussed with the patient I do believe she needs to be started back on her Symbicort.  Also discussed the need for close follow-up with her primary care provider.  Her lungs here are clear bilaterally, she is not hypoxic or tachycardic.  Do not believe imaging is warranted at this time.  Patient verbalized plan of care and stated understanding.    Problems Addressed:  Acute cough: acute illness or injury    Amount and/or Complexity of Data Reviewed  ECG/medicine tests: ordered and independent interpretation performed. Decision-making details documented in ED Course.    Risk  OTC drugs.        Disposition and Plan     Clinical Impression:  1. Mild intermittent asthma with exacerbation (HCC)    2. Acute cough         Disposition:  Discharge  1/27/2025  5:24 pm    Follow-up:  Jamal Perez MD  [address] [place] [id number]  [phone]                Medications Prescribed:  Discharge Medication List as of 1/27/2025  5:24 PM             Note to patient: The 21st Century cares act makes medical notes like these available to patients in the interest of transparency.  However, be advised this medical document and is intended as peer to peer communication.  It is read the medical language and may contain abbreviations or verbiage that are unfamiliar.  It may appear blunt or direct.  Medical documents are intended to carry relevant information, fax is evident and the clinical opinion of the practitioner.    This note was prepared using Dragon Medical voice recognition dictation software.  As a result, errors may occur.  When identified, these errors have been corrected.  While every attempt is made to correct errors during dictation, discrepancies may still exist.    Naomi Ahuja, SHEBA  1/27/2025  5:27 PM

## 2025-01-27 NOTE — ED INITIAL ASSESSMENT (HPI)
Pt with cough x 4 days, states she had a similar cough last month. No fever. No congestion. Hx of asthma.

## 2025-01-27 NOTE — DISCHARGE INSTRUCTIONS
Please continue nebulizer treatments at home.  I have also sent Symbicort to the pharmacy on file.  Please take that as directed.  Close follow-up with your primary care provider is recommended.  Any worsening symptoms please go to the emergency department.

## 2025-01-30 ENCOUNTER — HOSPITAL ENCOUNTER (OUTPATIENT)
Age: 25
Discharge: HOME | End: 2025-01-30
Payer: COMMERCIAL

## 2025-01-30 ENCOUNTER — APPOINTMENT (OUTPATIENT)
Dept: GENERAL RADIOLOGY | Age: 25
End: 2025-01-30
Attending: PHYSICIAN ASSISTANT
Payer: COMMERCIAL

## 2025-01-30 VITALS
OXYGEN SATURATION: 100 % | HEART RATE: 98 BPM | TEMPERATURE: 99 F | SYSTOLIC BLOOD PRESSURE: 135 MMHG | DIASTOLIC BLOOD PRESSURE: 75 MMHG | RESPIRATION RATE: 18 BRPM

## 2025-01-30 DIAGNOSIS — J01.00 ACUTE NON-RECURRENT MAXILLARY SINUSITIS: ICD-10-CM

## 2025-01-30 DIAGNOSIS — J45.40: Primary | ICD-10-CM

## 2025-01-30 PROCEDURE — 71046 X-RAY EXAM CHEST 2 VIEWS: CPT | Performed by: PHYSICIAN ASSISTANT

## 2025-01-30 PROCEDURE — 99213 OFFICE O/P EST LOW 20 MIN: CPT | Performed by: PHYSICIAN ASSISTANT

## 2025-01-30 RX ORDER — AZITHROMYCIN 250 MG/1
TABLET, FILM COATED ORAL
Qty: 6 TABLET | Refills: 0 | Status: SHIPPED | OUTPATIENT
Start: 2025-01-30 | End: 2025-02-04

## 2025-01-30 RX ORDER — CODEINE PHOSPHATE AND GUAIFENESIN 10; 100 MG/5ML; MG/5ML
5 SOLUTION ORAL EVERY 6 HOURS PRN
Qty: 100 ML | Refills: 0 | Status: SHIPPED | OUTPATIENT
Start: 2025-01-30 | End: 2025-02-04

## 2025-01-30 RX ORDER — ALBUTEROL SULFATE 90 UG/1
2 INHALANT RESPIRATORY (INHALATION) EVERY 4 HOURS PRN
Qty: 1 EACH | Refills: 0 | Status: SHIPPED | OUTPATIENT
Start: 2025-01-30 | End: 2025-02-09

## 2025-01-30 RX ORDER — PREDNISONE 20 MG/1
40 TABLET ORAL DAILY
Qty: 10 TABLET | Refills: 0 | Status: SHIPPED | OUTPATIENT
Start: 2025-01-30 | End: 2025-02-04

## 2025-01-30 RX ORDER — FLUTICASONE PROPIONATE 50 MCG
1 SPRAY, SUSPENSION (ML) NASAL 2 TIMES DAILY
Qty: 16 G | Refills: 0 | Status: SHIPPED | OUTPATIENT
Start: 2025-01-30 | End: 2025-02-09

## 2025-01-30 RX ORDER — BENZONATATE 200 MG/1
200 CAPSULE ORAL 3 TIMES DAILY PRN
Qty: 20 CAPSULE | Refills: 0 | Status: SHIPPED | OUTPATIENT
Start: 2025-01-30 | End: 2025-02-06

## 2025-01-30 NOTE — ED PROVIDER NOTES
Chief Complaint   Patient presents with    Cough    Nasal Congestion       HPI:     Anne Walter is a 24 year old female who presents for evaluation of cough congestion and wheezing over the last week, notes was seen through our facility 3 days ago with a negative coronavirus influenza screen discharged home with budesonide aerosol with baseline MDI as well as nebulizers albuterol.  Patient denies recent corticosteroids or antibiotics.  Notes increasing chest and nasal congestion over the last few days, denies associated fever antipruritics beyond periodic ibuprofen no dose today, afebrile on arrival.  Denies any other cardiopulmonary issues.  Associated headache dizziness earache sore throat dysphagia neck pain chest pain active shortness of breath abdominal pain dysuria hematuria upper lower extremity weakness numbness or swelling      PFSH    PFSH asessment screens reviewed and agree.  Nurses notes reviewed I agree with documentation.    Family History   Problem Relation Age of Onset    No Known Problems Father     No Known Problems Mother     Diabetes Other     Cancer Neg     Heart Disorder Neg     Hypertension Neg     Breast Cancer Neg     Ovarian Cancer Neg     Uterine Cancer Neg     Colon Cancer Neg      Family history reviewed with patient/caregiver and is not pertinent to presenting problem.  Social History     Socioeconomic History    Marital status: Single     Spouse name: Not on file    Number of children: Not on file    Years of education: Not on file    Highest education level: Not on file   Occupational History    Not on file   Tobacco Use    Smoking status: Never    Smokeless tobacco: Never   Vaping Use    Vaping status: Never Used   Substance and Sexual Activity    Alcohol use: Not Currently     Comment: occasional    Drug use: Not Currently     Comment: occasional gummies    Sexual activity: Not on file   Other Topics Concern    Not on file   Social History Narrative    Not on file     Social  Drivers of Health     Financial Resource Strain: Not on file   Food Insecurity: No Food Insecurity (1/7/2025)    NCSS - Food Insecurity     Worried About Running Out of Food in the Last Year: No     Ran Out of Food in the Last Year: No   Transportation Needs: No Transportation Needs (1/7/2025)    NCSS - Transportation     Lack of Transportation: No   Physical Activity: Not on file   Stress: Not on file   Social Connections: Not on file   Housing Stability: Not At Risk (1/7/2025)    NCSS - Housing/Utilities     Has Housing: Yes     Worried About Losing Housing: No     Unable to Get Utilities: No         ROS:   Positive for stated complaint: Congestion cough.  All other systems reviewed and negative except as noted above.  Constitutional and Vital Signs Reviewed.      Physical Exam:     Findings:    /75   Pulse 98   Temp 99.4 °F (37.4 °C) (Oral)   Resp 18   LMP 01/07/2025 (Within Days)   SpO2 100%   GENERAL: well developed, well nourished, well hydrated, no distress  SKIN: good skin turgor, no obvious rashes  NECK: no JVD.  Supple, no adenopathy  CARDIO: RRR without murmur  EXTREMITIES: no cyanosis or edema. GUERRA without difficulty  GI: soft, non-tender, normal bowel sounds  HEAD: normocephalic, atraumatic  EYES: sclera non icteric bilateral, conjunctiva clear  EARS: TMs clear bilaterally. Canals clear.  NOSE: Rhinorrhea. MMM. nasal turbinates: pink, normal mucosa  THROAT: clear, without exudates, uvula midline, and airway patent  LUNGS: Lung sound midlung with expiratory wheeze to bilateral bases moderate.  No retractions.  No rales, rhonchi  NEURO: No focal deficits  PSYCH: Alert and oriented x3.  Answering questions appropriately.  Mood appropriate.    MDM/Assessment/Plan:   Orders for this encounter:    Orders Placed This Encounter    XR CHEST PA + LAT CHEST (MYB=[id number])     Order Specific Question:   What is the Relevant Clinical Indication / Reason for Exam?     Answer:   cough, SOB, asthma     Order  Specific Question:   Release to patient     Answer:   Immediate    predniSONE 20 MG Oral Tab     Sig: Take 2 tablets (40 mg total) by mouth daily for 5 days.     Dispense:  10 tablet     Refill:  0    fluticasone propionate 50 MCG/ACT Nasal Suspension     Si spray by Nasal route in the morning and 1 spray before bedtime. Do all this for 10 days.     Dispense:  16 g     Refill:  0    benzonatate 200 MG Oral Cap     Sig: Take 1 capsule (200 mg total) by mouth 3 (three) times daily as needed for cough.     Dispense:  20 capsule     Refill:  0    azithromycin (ZITHROMAX Z-BETY) 250 MG Oral Tab     Si mg once followed by 250 mg daily x 4 days     Dispense:  6 tablet     Refill:  0    albuterol 108 (90 Base) MCG/ACT Inhalation Aero Soln     Sig: Inhale 2 puffs into the lungs every 4 (four) hours as needed.     Dispense:  1 each     Refill:  0    guaiFENesin-codeine (GUAIFENESIN AC) 100-10 MG/5ML Oral Solution     Sig: Take 5 mL by mouth every 6 (six) hours as needed for cough.     Dispense:  100 mL     Refill:  0       Labs performed this visit:  No results found for this or any previous visit (from the past 10 hours).    MDM:  Patient x-ray shows no infiltrate or cardiomegaly.  Agrees to continue baseline medications with MDI provided by request if outside of household work and duration nebulizers including budesonide.  All 5 systemic corticosteroids by recommendation.  Will cover empirically for sinusitis based on patient concerns without recent antibiotic and nasal spray for congestion and postural drainage.  Happy with plan of care.    Diagnosis:    ICD-10-CM    1. Moderate persistent asthmatic bronchitis without complication (HCC)  J45.40 XR CHEST PA + LAT CHEST (CPT=71046)     XR CHEST PA + LAT CHEST (CPT=71046)     guaiFENesin-codeine (GUAIFENESIN AC) 100-10 MG/5ML Oral Solution      2. Acute non-recurrent maxillary sinusitis  J01.00           All results reviewed and discussed with patient.  See AVS for  detailed discharge instructions for your condition today.    Follow Up with:  Jamal Perez MD  [address] [facility] [address] [phone]    Schedule an appointment as soon as possible for a visit in 3 days  follow up    Percy Gaxiola MD  [address], [address] [place] [address] [phone]    Schedule an appointment as soon as possible for a visit in 1 week  LUNG REFERRAL

## 2025-02-25 ENCOUNTER — APPOINTMENT (OUTPATIENT)
Dept: GENERAL RADIOLOGY | Age: 25
End: 2025-02-25
Payer: COMMERCIAL

## 2025-02-25 ENCOUNTER — HOSPITAL ENCOUNTER (OUTPATIENT)
Age: 25
Discharge: HOME | End: 2025-02-25
Payer: COMMERCIAL

## 2025-02-25 VITALS
OXYGEN SATURATION: 99 % | SYSTOLIC BLOOD PRESSURE: 126 MMHG | DIASTOLIC BLOOD PRESSURE: 76 MMHG | TEMPERATURE: 98 F | HEART RATE: 78 BPM | RESPIRATION RATE: 18 BRPM

## 2025-02-25 DIAGNOSIS — T14.90XA SOFT TISSUE INJURY: Primary | ICD-10-CM
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
## 2025-02-25 PROCEDURE — 73610 X-RAY EXAM OF ANKLE: CPT

## 2025-02-25 PROCEDURE — 71101 X-RAY EXAM UNILAT RIBS/CHEST: CPT

## 2025-02-25 PROCEDURE — 73502 X-RAY EXAM HIP UNI 2-3 VIEWS: CPT

## 2025-02-25 PROCEDURE — 73130 X-RAY EXAM OF HAND: CPT

## 2025-02-25 PROCEDURE — 99214 OFFICE O/P EST MOD 30 MIN: CPT

## 2025-02-25 PROCEDURE — 73560 X-RAY EXAM OF KNEE 1 OR 2: CPT

## 2025-02-25 PROCEDURE — 73080 X-RAY EXAM OF ELBOW: CPT

## 2025-02-25 PROCEDURE — 73030 X-RAY EXAM OF SHOULDER: CPT

## 2025-02-25 RX ORDER — IBUPROFEN 600 MG/1
600 TABLET, FILM COATED ORAL EVERY 8 HOURS PRN
Qty: 30 TABLET | Refills: 0 | Status: SHIPPED | OUTPATIENT
Start: 2025-02-25 | End: 2025-03-04

## 2025-02-25 RX ORDER — IBUPROFEN 600 MG/1
600 TABLET, FILM COATED ORAL ONCE
Status: COMPLETED | OUTPATIENT
Start: 2025-02-25 | End: 2025-02-25

## 2025-02-25 NOTE — ED INITIAL ASSESSMENT (HPI)
Fall today on ice in own driveway at 0800.  Here this evening c/o pain \"on the whole right side of my body\"  right shoulder, ribs, side, hip, entire leg.  Pt states mom drove her here and dropped her off, pt states unable to walk well bc of the pain.

## 2025-02-26 NOTE — ED PROVIDER NOTES
Patient Seen in: [facility]      History     Chief Complaint   Patient presents with    Leg or Foot Injury    Arm or Hand Injury    Fall     Stated Complaint: rt side pain from fall  Subjective:   Anne is a 24-year-old female presenting to the immediate care complaining of pain to the entire right side of her body following a mechanical fall this morning.  Patient states that she slipped on ice this morning causing her to fall on her right side.  Patient states that she got up and went to work but then was sent home because she was in pain.  Took some ibuprofen but is still having pain so she came in for evaluation.  Patient states that everything on the right side of her body hurts.  States that she did not hit her head or have a loss of consciousness.  She denies any head, neck or back pain.  States that she has pain on her shoulder, elbow, right ribs, hip, knee and ankle.  Patient is also concerned because she had surgery on her right ankle and wants to make sure that everything there is okay.  There is no obvious deformity.  There is no obvious bruising or swelling.  Patient denies any other concerns or complaints.        Objective:   Past Medical History:    Asthma (HCC)    Obesity    Osteoarthritis            Past Surgical History:   Procedure Laterality Date    Other surgical history  01/09/2023    Right ankle arthroscopy with synovectomy    Tear duct system surg unlisted  2002              Social History     Socioeconomic History    Marital status: Single   Tobacco Use    Smoking status: Never    Smokeless tobacco: Never   Vaping Use    Vaping status: Never Used   Substance and Sexual Activity    Alcohol use: Not Currently     Comment: occasional    Drug use: Not Currently     Comment: occasional gummies     Social Drivers of Health     Food Insecurity: No Food Insecurity (1/7/2025)    NCSS - Food Insecurity     Worried About Running Out of Food in the Last Year: No     Ran Out of Food in  the Last Year: No   Transportation Needs: No Transportation Needs (1/7/2025)    NCSS - Transportation     Lack of Transportation: No   Housing Stability: Not At Risk (1/7/2025)    NCSS - Housing/Utilities     Has Housing: Yes     Worried About Losing Housing: No     Unable to Get Utilities: No            Review of Systems    Positive for stated complaint: Leg or Foot Injury, Arm or Hand Injury, and Fall    Other systems are as noted in HPI.  Constitutional and vital signs reviewed.      All other systems reviewed and negative except as noted above.    Physical Exam     ED Triage Vitals [02/25/25 1735]   /76   Pulse 78   Resp 18   Temp 97.9 °F (36.6 °C)   Temp src Oral   SpO2 99 %   O2 Device None (Room air)     Current:/76   Pulse 78   Temp 97.9 °F (36.6 °C) (Oral)   Resp 18   LMP 01/28/2025 (Within Days)   SpO2 99%     Physical Exam  Vitals and nursing note reviewed.   Constitutional:       General: She is not in acute distress.     Appearance: Normal appearance. She is not ill-appearing, toxic-appearing or diaphoretic.   HENT:      Head: Normocephalic.   Cardiovascular:      Rate and Rhythm: Normal rate.   Pulmonary:      Effort: Pulmonary effort is normal. No tachypnea, bradypnea, accessory muscle usage, prolonged expiration, respiratory distress or retractions.      Breath sounds: Normal breath sounds and air entry. No stridor, decreased air movement or transmitted upper airway sounds. No decreased breath sounds, wheezing, rhonchi or rales.   Chest:      Chest wall: Tenderness present. No mass, lacerations, deformity, swelling, crepitus or edema.      Comments: Tenderness to the right lateral ribs  Musculoskeletal:         General: Normal range of motion.      Right shoulder: Tenderness and bony tenderness present. No swelling, deformity, effusion, laceration or crepitus. Normal range of motion. Normal strength. Normal pulse.      Left shoulder: Normal.      Right upper arm: Normal.      Left  upper arm: Normal.      Right elbow: No swelling, deformity, effusion or lacerations. Normal range of motion. Tenderness present in olecranon process.      Left elbow: Normal.      Right forearm: Normal.      Left forearm: Normal.      Right wrist: Normal.      Left wrist: Normal.      Right hand: Tenderness present. No swelling, deformity, lacerations or bony tenderness. Normal range of motion. Normal strength. Normal sensation. Normal capillary refill. Normal pulse.      Left hand: Normal.      Cervical back: Normal range of motion.      Comments: Positive CMS.  2+ radial and ulnar pulses. Capillary refill less than 2 seconds.  Patient does have full range of motion to the right wrist, elbow and shoulder.  Patient has tenderness to the anterior right shoulder with palpation.  Also has tenderness to her right elbow over the olecranon process.  She has tenderness to the right hand over the thenar eminence.  There is no swelling noted.  No ecchymosis or warmth noted.  No erythema noted.  No abrasions or lacerations noted.  No bleeding noted.  No obvious deformity is noted.      Positive CMS.  2+ DP and PT pulses.  Capillary refill less than 2 seconds.  Patient has full range of motion to the entire right hip, knee ankle.  Full range of motion to the entire lower extremity.  Patient states that she has tenderness to the lateral right hip, entire right knee and her right ankle.  Patient has no calf pain, tenderness, swelling, erythema or warmth. No abrasions or lacerations noted.  No ecchymosis noted.  No obvious deformity noted.  No swelling noted.  No drainage or discharge noted.     Skin:     General: Skin is warm and dry.      Capillary Refill: Capillary refill takes less than 2 seconds.   Neurological:      General: No focal deficit present.      Mental Status: She is alert and oriented to person, place, and time.   Psychiatric:         Mood and Affect: Mood normal.         Behavior: Behavior normal.          Thought Content: Thought content normal.         Judgment: Judgment normal.         ED Course   Radiology:    XR ANKLE (MIN 3 VIEWS), RIGHT (CPT=73610)   Final Result   PROCEDURE: XR ANKLE (MIN 3 VIEWS), RIGHT (CPT=73610)       COMPARISON: [facility] [place] [facility], XR ANKLE    WEIGHTBEARING (3 VIEWS), RIGHT (CPT=73610), 9/25/2024, 2:53 PM.  [facility], XR ANKLE (MIN 3 VIEWS), RIGHT (CPT=73610), 8/25/2022,    0:21 AM.  [facility], XR    ANKLE (MIN 3 VIEWS), RIGHT (CPT=73610), 8/09/2022, 12:06 PM.  [facility], XR ANKLE (MIN 3 VIEWS), RIGHT (CPT=73610), 4/01/2022,    1:46 PM.       INDICATIONS: Right ankle pain post fall today.       TECHNIQUE: 3 views were obtained.         FINDINGS:    BONES: Well-corticated osseous density at the tip of the lateral    malleolus, sequela of remote injury. There is no acute fracture or    dislocation. The ankle mortise is maintained. There is a plantar calcaneal    enthesophyte.   SOFT TISSUES: Lateral soft tissue swelling.   EFFUSION: None visible.    OTHER: Negative.                    =====   CONCLUSION:        Lateral soft tissue swelling without acute fracture or dislocation.                 Dictated by (CST): Bonifacio Prather MD on 2/25/2025 at 7:34 PM        Finalized by (CST): Bonifacio Prather MD on 2/25/2025 at 7:35 PM               XR ELBOW, COMPLETE (MIN 3 VIEWS), RIGHT (CPT=73080)   Final Result   PROCEDURE: XR ELBOW, COMPLETE (MIN 3 VIEWS), RIGHT (CPT=73080)       COMPARISON: None.       INDICATIONS: Right elbow pain post fall today.       TECHNIQUE: 6 views were obtained.         FINDINGS:    BONES: Normal. No significant arthropathy, fracture or acute abnormality.   SOFT TISSUES: Negative. No visible soft tissue swelling.    EFFUSION: None visible.    OTHER: Negative.                    =====   CONCLUSION: Normal examination.                 Dictated by (CST): Bonifacio Prather MD on 2/25/2025 at 7:34 PM         Finalized by (CST): Bonifacio Prather MD on 2/25/2025 at 7:34 PM               XR HAND (MIN 3 VIEWS), RIGHT (CPT=73130)   Final Result   PROCEDURE: XR HAND (MIN 3 VIEWS), RIGHT (CPT=73130)       COMPARISON: None.       INDICATIONS: Right hand pain post fall today.       TECHNIQUE: 3 views were obtained.         FINDINGS:    BONES: Normal. No significant arthropathy, fracture or acute abnormality.   SOFT TISSUES: Negative. No visible soft tissue swelling.    EFFUSION: None visible.    OTHER: Negative.                    =====   CONCLUSION: Normal examination.                 Dictated by (CST): Bonifacio Prather MD on 2/25/2025 at 7:33 PM        Finalized by (CST): Bonifacio Prather MD on 2/25/2025 at 7:34 PM               XR HIP W OR WO PELVIS 2 OR 3 VIEWS, RIGHT (CPT=73502)   Final Result   PROCEDURE: XR HIP W OR WO PELVIS 2 OR 3 VIEWS, RIGHT (CPT=73502)       COMPARISON: None.       INDICATIONS: Right hip pain post fall today.       TECHNIQUE: 3 views were obtained.         FINDINGS:    BONES: Normal. No significant arthropathy, fracture or acute abnormality.   SOFT TISSUES: Negative. No visible soft tissue swelling.    EFFUSION: None visible.    OTHER: Negative.                    =====   CONCLUSION: Normal examination.                 Dictated by (CST): Bonifacio Prather MD on 2/25/2025 at 7:32 PM        Finalized by (CST): Bonifacio Prather MD on 2/25/2025 at 7:33 PM               XR KNEE (1 OR 2 VIEWS), RIGHT (CPT=73560)   Final Result   PROCEDURE: XR KNEE (1 OR 2 VIEWS), RIGHT (CPT=73560)       COMPARISON: None.       INDICATIONS: Right anterior knee pain post fall today.       TECHNIQUE: 2 views were obtained.         FINDINGS:    BONES: The medial and lateral compartments are maintained.  Tiny    tricompartmental osteophytes. There is no acute fracture or dislocation.   SOFT TISSUES: Negative. No visible soft tissue swelling.    EFFUSION: None visible.    OTHER: Negative.                    =====   CONCLUSION:        Tiny  tricompartmental osteophytes with preserved joint spaces.                     Dictated by (CST): Bonifacio Prather MD on 2/25/2025 at 7:32 PM        Finalized by (CST): Bonifacio Prather MD on 2/25/2025 at 7:32 PM               XR RIBS WITH CHEST (3 VIEWS), RIGHT  (CPT=71101)   Final Result   PROCEDURE: XR RIBS WITH CHEST (3 VIEWS), RIGHT (CPT=71101)       COMPARISON: [facility], XR CHEST PA + LAT CHEST    (CPT=71046), 1/30/2025, 5:06 PM.  [facility], XR CHEST PA +    LAT CHEST (CPT=71046), 1/06/2025, 9:19 AM.  [facility], XR    CHEST PA + LAT CHEST (QFT=96057),    1/02/2025, 6:27 PM.       INDICATIONS: Rigt lateral rib pain post fall today.       TECHNIQUE:   Four views.         FINDINGS:        BONES: Normal.  No significant arthropathy or acute abnormality.     SOFT TISSUES: Negative.  No visible soft tissue swelling.     LUNGS: Normal     PLEURA: Normal.   OTHER: Negative.                   =====   CONCLUSION: Normal examination.                 Dictated by (CST): Bonifacio Prather MD on 2/25/2025 at 7:35 PM        Finalized by (CST): Bonifacio Prather MD on 2/25/2025 at 7:36 PM               XR SHOULDER, COMPLETE (MIN 2 VIEWS), RIGHT (CPT=73030)   Final Result   PROCEDURE: XR SHOULDER, COMPLETE (MIN 2 VIEWS), RIGHT (CPT=73030)       COMPARISON: None.       INDICATIONS: Right shoulder pain post fall today.       TECHNIQUE: 4 views were obtained.         FINDINGS:    BONES: Normal. No significant arthropathy, fracture or acute abnormality.   SOFT TISSUES: Negative. No visible soft tissue swelling.    EFFUSION: None visible.    OTHER: Negative.                    =====   CONCLUSION: Normal examination.                 Dictated by (CST): Bonifacio Prather MD on 2/25/2025 at 7:31 PM        Finalized by (CST): Bonifacio Prather MD on 2/25/2025 at 7:32 PM                 Labs Reviewed - No data to display    MDM     Medical Decision Making  Differential diagnoses reflecting the complexity of care  include but are not limited to bony versus soft tissue injury to the right side of the body.    Comorbidities that add complexity to management include: None  History obtained by an independent source was from: Patient  My independent interpretations of studies include: X-rays  Patient is well appearing, non-toxic and in no acute distress.  Vital signs are stable.     There are no fractures on x-rays per the radiology read.  Patient's history and physical exam are consistent with soft tissue injury.  There are no significant physical exam findings except for tenderness with palpation.   Recommended RICE.  Recommended using Tylenol and Motrin for pain.  Recommended that if the patient develop any numbness, tingling, acutely worsening pain, swelling or any other concerns or complaints they go to the emergency department.  Recommended that if patient has persistent pain they make an appointment to follow-up with the physical medicine specialist.  Otherwise recommended follow-up with primary care doctor.    ED precautions discussed.  Patient (guardian) advised to follow up with PCP in 2-3 days.  Patient (guardian) agrees with this plan of care.  Patient (guardian) verbalizes understanding of discharge instructions and plan of care.      Amount and/or Complexity of Data Reviewed  Radiology: ordered and independent interpretation performed. Decision-making details documented in ED Course.    Risk  OTC drugs.  Prescription drug management.        Disposition and Plan     Clinical Impression:  1. Soft tissue injury    2. Fall, initial encounter         Disposition:  Discharge  2/25/2025  7:40 pm    Follow-up:  Jamal Perez MD  [address] [place] [id number]  [phone]          Choco Auguste MD  [address] [place] [address]  [phone]                Medications Prescribed:  Current Discharge Medication List        START taking these medications    Details   !! ibuprofen 600 MG Oral Tab  Take 1 tablet (600 mg total) by mouth every 8 (eight) hours as needed for Pain or Fever.  Qty: 30 tablet, Refills: 0       !! - Potential duplicate medications found. Please discuss with provider.

## 2025-02-26 NOTE — DISCHARGE INSTRUCTIONS
There is no fractures on any of your x-ray.    Rest, ice and elevate.    Take Tylenol and Motrin for pain as needed.    If you develop any numbness, tingling, acutely worsening pain, swelling or any other concerning complaints you should go to the emergency department.  If you have persistent pain for more than the next week make an appointment to see the physical medicine specialist.  Otherwise follow-up with your primary care doctor.

## 2025-03-27 ENCOUNTER — OFFICE VISIT (OUTPATIENT)
Dept: FAMILY MEDICINE CLINIC | Facility: CLINIC | Age: 25
End: 2025-03-27

## 2025-03-27 VITALS
BODY MASS INDEX: 48.82 KG/M2 | SYSTOLIC BLOOD PRESSURE: 122 MMHG | WEIGHT: 293 LBS | HEIGHT: 65 IN | DIASTOLIC BLOOD PRESSURE: 82 MMHG | HEART RATE: 80 BPM

## 2025-03-27 DIAGNOSIS — Z00.00 WELL ADULT EXAM: Primary | ICD-10-CM

## 2025-03-27 DIAGNOSIS — J45.41 MODERATE PERSISTENT ASTHMA WITH ACUTE EXACERBATION (HCC): ICD-10-CM

## 2025-03-27 PROCEDURE — 99214 OFFICE O/P EST MOD 30 MIN: CPT | Performed by: NURSE PRACTITIONER

## 2025-03-27 PROCEDURE — 99395 PREV VISIT EST AGE 18-39: CPT | Performed by: NURSE PRACTITIONER

## 2025-03-27 RX ORDER — PREDNISONE 50 MG/1
TABLET ORAL
COMMUNITY
Start: 2025-03-24

## 2025-03-27 NOTE — PROGRESS NOTES
HPI    Patient presents for annual physical.  Was in the ER on 3/24 for asthma exacerbation.  Still with some sob.  Has had multiple ER and urgent care visits for asthma exacerbations.    Last pap - never has completed.    LMP - Patient's last menstrual period was 01/28/2025 (within days).  Diet - diet has been fair.  Trying to make efforts to eat healthier.    Exercise - making efforts to try to get more active.    Immunizations -     Review of Systems   Respiratory:  Positive for shortness of breath.         Vitals:    03/27/25 1629 03/27/25 1658   BP: 150/82 122/82   Pulse: 80    Weight: (!) 410 lb (186 kg)    Height: 5' 5\" (1.651 m)      Body mass index is 68.23 kg/m².    Health Maintenance   Topic Date Due    Pneumococcal Vaccine: Birth to 50yrs (1 of 2 - PCV) Never done    Asthma Control Test  04/24/2018    Pap Smear  Never done    DTaP,Tdap,and Td Vaccines (7 - Td or Tdap) 04/29/2020    COVID-19 Vaccine (3 - 2024-25 season) 09/01/2024    Influenza Vaccine (1) 10/01/2024    Annual Depression Screening  01/01/2025    Annual Physical  01/31/2025    HPV Vaccines  Completed    Meningococcal B Vaccine  Aged Out       Past Medical History:    Asthma (HCC)    Obesity    Osteoarthritis       .  Past Surgical History:   Procedure Laterality Date    Other surgical history  01/09/2023    Right ankle arthroscopy with synovectomy    Tear duct system surg unlisted  2002       Family History   Problem Relation Age of Onset    No Known Problems Father     No Known Problems Mother     Diabetes Other     Cancer Neg     Heart Disorder Neg     Hypertension Neg     Breast Cancer Neg     Ovarian Cancer Neg     Uterine Cancer Neg     Colon Cancer Neg        Social History     Socioeconomic History    Marital status: Single     Spouse name: Not on file    Number of children: Not on file    Years of education: Not on file    Highest education level: Not on file   Occupational History    Not on file   Tobacco Use    Smoking status:  Never    Smokeless tobacco: Never   Vaping Use    Vaping status: Never Used   Substance and Sexual Activity    Alcohol use: Not Currently     Comment: occasional    Drug use: Not Currently     Comment: occasional gummies    Sexual activity: Not on file   Other Topics Concern    Not on file   Social History Narrative    Not on file     Social Drivers of Health     Food Insecurity: No Food Insecurity (1/7/2025)    NCSS - Food Insecurity     Worried About Running Out of Food in the Last Year: No     Ran Out of Food in the Last Year: No   Transportation Needs: No Transportation Needs (1/7/2025)    NCSS - Transportation     Lack of Transportation: No   Stress: Not on file   Housing Stability: Not At Risk (1/7/2025)    NCSS - Housing/Utilities     Has Housing: Yes     Worried About Losing Housing: No     Unable to Get Utilities: No       Current Outpatient Medications   Medication Sig Dispense Refill    predniSONE 50 MG Oral Tab       Budesonide-Formoterol Fumarate (SYMBICORT) 160-4.5 MCG/ACT Inhalation Aerosol Inhale 2 puffs into the lungs 2 (two) times daily. 10.2 g 2    Spacer/Aero-Holding Chambers Does not apply Device Use with albuterol inhaler 1 each 0    ibuprofen 800 MG Oral Tab Take 1 tablet (800 mg total) by mouth every 6 (six) hours as needed for Pain. 60 tablet 1    albuterol (2.5 MG/3ML) 0.083% Inhalation Nebu Soln TAKE 3 ML BY NEBULIZATION EVERY 4 HOURS AS NEEDED FOR WHEEZING AND SHORTNESS OF BREATH 90 mL 2    meclizine 25 MG Oral Tab Take 1 tablet (25 mg total) by mouth 3 (three) times daily as needed. 30 tablet 0    Respiratory Therapy Supplies (NEBULIZER/TUBING/MOUTHPIECE) Does not apply Kit Use as needed with nebulizer machine and albuterol solution every 4-6 hours for wheezing or shortness of breath 1 Package 0    Spacer/Aero-Holding Chambers Does not apply Device Use with albuterol inhaler 1 Device 0       Allergies:  Allergies[1]    Physical Exam  Vitals and nursing note reviewed.   Constitutional:        General: She is not in acute distress.     Appearance: Normal appearance. She is well-developed. She is not ill-appearing, toxic-appearing or diaphoretic.   HENT:      Head: Normocephalic and atraumatic.      Right Ear: Hearing, tympanic membrane, ear canal and external ear normal. There is no impacted cerumen.      Left Ear: Hearing, tympanic membrane, ear canal and external ear normal. There is no impacted cerumen.      Nose: Nose normal. No congestion or rhinorrhea.      Mouth/Throat:      Mouth: Mucous membranes are moist.      Pharynx: Oropharynx is clear. No oropharyngeal exudate or posterior oropharyngeal erythema.   Eyes:      General:         Right eye: No discharge.         Left eye: No discharge.      Conjunctiva/sclera: Conjunctivae normal.   Neck:      Thyroid: No thyromegaly.   Cardiovascular:      Rate and Rhythm: Normal rate and regular rhythm.      Pulses: Normal pulses.      Heart sounds: Normal heart sounds. No murmur heard.  Pulmonary:      Effort: Pulmonary effort is normal. No respiratory distress.      Breath sounds: Normal breath sounds. No stridor. No wheezing, rhonchi or rales.   Chest:      Chest wall: No tenderness.   Abdominal:      General: Abdomen is flat. Bowel sounds are normal. There is no distension.      Palpations: Abdomen is soft. There is no mass.      Tenderness: There is no abdominal tenderness. There is no guarding or rebound.      Hernia: No hernia is present.   Musculoskeletal:         General: Normal range of motion.      Cervical back: Normal range of motion and neck supple.   Lymphadenopathy:      Cervical: No cervical adenopathy.   Skin:     General: Skin is warm and dry.   Neurological:      Mental Status: She is alert and oriented to person, place, and time.   Psychiatric:         Mood and Affect: Mood normal.         Behavior: Behavior normal.         Thought Content: Thought content normal.         Judgment: Judgment normal.          Assessment and Plan:    Problem List Items Addressed This Visit    None  Visit Diagnoses       Well adult exam    -  Primary    Relevant Orders    CBC With Differential With Platelet    Comp Metabolic Panel (14)    Lipid Panel    TSH W Reflex To Free T4    Moderate persistent asthma with acute exacerbation (HCC)        Relevant Medications    predniSONE 50 MG Oral Tab    Other Relevant Orders    Pulmonary Referral - In Network           Follow-up for fasting labs.  Referral to pulmonology for assessment and treatment of asthma, exacerbations.    Discussed plan of care with patient and patient is in agreement.  All questions answered. Patient to call with questions or concerns.    Encouraged to sign up for My Chart if not already registered.        [1] No Known Allergies

## 2025-04-18 RX ORDER — IBUPROFEN 800 MG/1
800 TABLET, FILM COATED ORAL EVERY 6 HOURS PRN
Qty: 60 TABLET | Refills: 1 | Status: SHIPPED | OUTPATIENT
Start: 2025-04-18

## 2025-05-21 ENCOUNTER — HOSPITAL ENCOUNTER (OUTPATIENT)
Age: 25
Discharge: HOME | End: 2025-05-21
Payer: COMMERCIAL

## 2025-05-21 VITALS
RESPIRATION RATE: 24 BRPM | TEMPERATURE: 99 F | OXYGEN SATURATION: 100 % | DIASTOLIC BLOOD PRESSURE: 53 MMHG | HEART RATE: 70 BPM | SYSTOLIC BLOOD PRESSURE: 147 MMHG

## 2025-05-21 DIAGNOSIS — G44.209 TENSION HEADACHE: ICD-10-CM

## 2025-05-21 DIAGNOSIS — R42 VERTIGO: Primary | ICD-10-CM

## 2025-05-21 LAB
#MXD IC: 0.4 X10ˆ3/UL (ref 0.1–1)
ATRIAL RATE: 67 BPM
B-HCG UR QL: NEGATIVE
BUN BLD-MCNC: 10 MG/DL (ref 7–18)
CHLORIDE BLD-SCNC: 100 MMOL/L (ref 98–112)
CO2 BLD-SCNC: 29 MMOL/L (ref 21–32)
CREAT BLD-MCNC: 0.8 MG/DL (ref 0.55–1.02)
EGFRCR SERPLBLD CKD-EPI 2021: 104 ML/MIN/1.73M2 (ref 60–?)
GLUCOSE BLD-MCNC: 95 MG/DL (ref 70–99)
HCT VFR BLD AUTO: 36.8 % (ref 35–48)
HCT VFR BLD CALC: 39 % (ref 34–50)
HGB BLD-MCNC: 11.8 G/DL (ref 12–16)
ISTAT IONIZED CALCIUM FOR CHEM 8: 1.23 MMOL/L (ref 1.12–1.32)
LYMPHOCYTES # BLD AUTO: 2.1 X10ˆ3/UL (ref 1–4)
LYMPHOCYTES NFR BLD AUTO: 36 %
MCH RBC QN AUTO: 25.3 PG (ref 26–34)
MCHC RBC AUTO-ENTMCNC: 32.1 G/DL (ref 31–37)
MCV RBC AUTO: 78.8 FL (ref 80–100)
MIXED CELL %: 6.8 %
NEUTROPHILS # BLD AUTO: 3.2 X10ˆ3/UL (ref 1.5–7.7)
NEUTROPHILS NFR BLD AUTO: 57.2 %
P AXIS: 24 DEGREES
P-R INTERVAL: 164 MS
PLATELET # BLD AUTO: 388 X10ˆ3/UL (ref 150–450)
POTASSIUM BLD-SCNC: 4.2 MMOL/L (ref 3.6–5.1)
Q-T INTERVAL: 392 MS
QRS DURATION: 76 MS
QTC CALCULATION (BEZET): 414 MS
R AXIS: 46 DEGREES
RBC # BLD AUTO: 4.67 X10ˆ6/UL (ref 3.8–5.3)
SODIUM BLD-SCNC: 140 MMOL/L (ref 136–145)
T AXIS: 12 DEGREES
VENTRICULAR RATE: 67 BPM
WBC # BLD AUTO: 5.7 X10ˆ3/UL (ref 4–11)

## 2025-05-21 PROCEDURE — 85025 COMPLETE CBC W/AUTO DIFF WBC: CPT | Performed by: NURSE PRACTITIONER

## 2025-05-21 PROCEDURE — 93000 ELECTROCARDIOGRAM COMPLETE: CPT | Performed by: NURSE PRACTITIONER

## 2025-05-21 PROCEDURE — 80047 BASIC METABLC PNL IONIZED CA: CPT | Performed by: NURSE PRACTITIONER

## 2025-05-21 PROCEDURE — 99214 OFFICE O/P EST MOD 30 MIN: CPT | Performed by: NURSE PRACTITIONER

## 2025-05-21 PROCEDURE — S0119 ONDANSETRON 4 MG: HCPCS | Performed by: NURSE PRACTITIONER

## 2025-05-21 PROCEDURE — 81025 URINE PREGNANCY TEST: CPT | Performed by: NURSE PRACTITIONER

## 2025-05-21 RX ORDER — IBUPROFEN 600 MG/1
600 TABLET, FILM COATED ORAL ONCE
Status: COMPLETED | OUTPATIENT
Start: 2025-05-21 | End: 2025-05-21

## 2025-05-21 RX ORDER — ONDANSETRON 4 MG/1
4 TABLET, ORALLY DISINTEGRATING ORAL ONCE
Status: COMPLETED | OUTPATIENT
Start: 2025-05-21 | End: 2025-05-21

## 2025-05-21 RX ORDER — ONDANSETRON 4 MG/1
4 TABLET, ORALLY DISINTEGRATING ORAL EVERY 4 HOURS PRN
Qty: 10 TABLET | Refills: 0 | Status: SHIPPED | OUTPATIENT
Start: 2025-05-21 | End: 2025-05-28

## 2025-05-21 RX ORDER — MECLIZINE HYDROCHLORIDE 25 MG/1
25 TABLET ORAL 3 TIMES DAILY PRN
Qty: 30 TABLET | Refills: 0 | Status: SHIPPED | OUTPATIENT
Start: 2025-05-21

## 2025-05-21 RX ORDER — MECLIZINE HYDROCHLORIDE 25 MG/1
25 TABLET ORAL ONCE
Status: COMPLETED | OUTPATIENT
Start: 2025-05-21 | End: 2025-05-21

## 2025-05-21 NOTE — ED PROVIDER NOTES
Chief Complaint   Patient presents with    Dizziness       HPI:     Anne Walter is a 24 year old female presents with a chief complaint of dizziness, ongoing for 5 days, after she got home from a cruise.  She reports the sensation as a room spinning sensation, and occasionally feels lightheaded.  Symptoms worsen with position changes, and staring at her phone.  She also reports a headache for the past 5 days, that feels like a band around her head, a squeezing sensation. Rates it 7/10 currently. She has had intermittent blurred vision, none currently.  No weakness, numbness, or tingling.  No recent head injury.  No history of brain aneurysm.  She denies history of vertigo.  No fever.  No neck pain.  No URI symptoms.  Reports she took Advil yesterday for the headache, which helped.      [facility]  [facility] asessment screens reviewed and agree.  Nursing note reviewed and I agree with documentation.    Social History     Socioeconomic History    Marital status: Single     Spouse name: Not on file    Number of children: Not on file    Years of education: Not on file    Highest education level: Not on file   Occupational History    Not on file   Tobacco Use    Smoking status: Never    Smokeless tobacco: Never   Vaping Use    Vaping status: Never Used   Substance and Sexual Activity    Alcohol use: Not Currently     Comment: occasional    Drug use: Not Currently     Comment: occasional gummies    Sexual activity: Not on file   Other Topics Concern    Not on file   Social History Narrative    Not on file     Social Drivers of Health     Food Insecurity: No Food Insecurity (1/7/2025)    NCSS - Food Insecurity     Worried About Running Out of Food in the Last Year: No     Ran Out of Food in the Last Year: No   Transportation Needs: No Transportation Needs (1/7/2025)    NCSS - Transportation     Lack of Transportation: No   Housing Stability: Not At Risk (1/7/2025)    NCSS - Housing/Utilities     Has Housing: Yes     Worried  About Losing Housing: No     Unable to Get Utilities: No     Family History[1]  Family history reviewed with patient/caregiver and is not pertinent to presenting problem.      ROS:     Positive for stated complaint: headache, dizziness   All other systems reviewed and negative except as noted above.  Constitutional and Vital Signs Reviewed.    Physical Exam:     /53   Pulse 70   Temp 98.5 °F (36.9 °C) (Oral)   Resp 24   LMP 05/17/2025 (Approximate)   SpO2 100%   GENERAL: well developed, well nourished, well hydrated, no distress  EYES: sclera non icteric bilateral, BALBIR, EOMI. + unidirectional horizontal right beating nystagmus   HEENT: atraumatic, normocephalic, ears, nose and throat are clear  NECK: supple, no adenopathy  CARDIO: RRR without murmur  LUNGS: clear to auscultation, no RRW  GI: soft, non-tender, normal bowel sounds  NEURO: no focal deficits. Normal finger to nose. Normal gait.   EXTREMITIES: no cyanosis or edema, normal ROM  SKIN: good skin turgor, no obvious rashes    MDM/Assessment/Plan:   Orders for this encounter:    Orders Placed This Encounter    POCT CBC     Release to patient:   Immediate    EKG 12 Lead     Release to patient:   Immediate    POCT Pregnancy, Urine    POCT ISTAT chem8 cartridge    iStat (Chem 8)    POCT Pregnancy, Urine    ondansetron (Zofran-ODT) disintegrating tab 4 mg    ibuprofen (Motrin) tab 600 mg    meclizine (Antivert) tab 25 mg    meclizine 25 MG Oral Tab     Sig: Take 1 tablet (25 mg total) by mouth 3 (three) times daily as needed.     Dispense:  30 tablet     Refill:  0    ondansetron 4 MG Oral Tablet Dispersible     Sig: Take 1 tablet (4 mg total) by mouth every 4 (four) hours as needed for Nausea.     Dispense:  10 tablet     Refill:  0       Labs performed this visit:  Recent Results (from the past 10 hours)   EKG 12 Lead    Collection Time: 05/21/25  4:38 PM   Result Value Ref Range    Ventricular rate 67 BPM    Atrial rate 67 BPM    P-R Interval 164  ms    QRS Duration 76 ms    Q-T Interval 392 ms    QTC Calculation (Bezet) 414 ms    P Axis 24 degrees    R Axis 46 degrees    T Axis 12 degrees   POCT Pregnancy, Urine    Collection Time: 05/21/25  5:05 PM   Result Value Ref Range    POCT Urine Pregnancy Negative Negative   POCT CBC    Collection Time: 05/21/25  5:33 PM   Result Value Ref Range    WBC IC 5.7 4.0 - 11.0 x10ˆ3/uL    RBC IC 4.67 3.80 - 5.30 X10ˆ6/uL    HGB IC 11.8 (L) 12.0 - 16.0 g/dL    HCT IC 36.8 35.0 - 48.0 %    MCV IC 78.8 (L) 80.0 - 100.0 fL    MCH IC 25.3 (L) 26.0 - 34.0 pg    MCHC IC 32.1 31.0 - 37.0 g/dL    PLT .0 150.0 - 450.0 X10ˆ3/uL    # Neutrophil 3.2 1.5 - 7.7 X10ˆ3/uL    # Lymphocyte 2.1 1.0 - 4.0 X10ˆ3/uL    # Mixed Cells 0.4 0.1 - 1.0 X10ˆ3/uL    Neutrophil % 57.2 %    Lymphocyte % 36.0 %    Mixed Cell % 6.8 %   POCT ISTAT chem8 cartridge    Collection Time: 05/21/25  5:43 PM   Result Value Ref Range    ISTAT Sodium 140 136 - 145 mmol/L    ISTAT BUN 10 7 - 18 mg/dL    ISTAT Potassium 4.2 3.6 - 5.1 mmol/L    ISTAT Chloride 100 98 - 112 mmol/L    ISTAT Ionized Calcium 1.23 1.12 - 1.32 mmol/L    ISTAT Hematocrit 39 34 - 50 %    ISTAT Glucose 95 70 - 99 mg/dL    ISTAT TCO2 29 21 - 32 mmol/L    ISTAT Creatinine 0.80 0.55 - 1.02 mg/dL    eGFR-Cr 104 >=60 mL/min/1.73m2       MDM:  Medical Decision Making  Differentials considered: Vertigo versus tension headache versus migraine versus electrolyte derangement versus arrhythmia versus other    HPI and exam consistent with vertigo and tension headache.  Patient is healthy appearing, normal vitals, neurologically intact.  EKG with no ectopy- no arrhythmia. CBC with very mild anemia, Chem-8 normal.  Patient was given meclizine, Zofran, ibuprofen in clinic, with improvement in symptoms.  Advised on ER precautions.  Advised her to follow-up with primary care provider in 2 days if no improvement.  Patient verbalized understanding and agreeable to plan of care.    Case discussed with   Fede Carmona    Amount and/or Complexity of Data Reviewed  Labs: ordered. Decision-making details documented in ED Course.     Details: CBC, Chem-8, urine pregnancy  ECG/medicine tests: ordered and independent interpretation performed. Decision-making details documented in ED Course.     Details: EKG: SR, rate 67, no ST segment changes, no ectopy, TWI in V2 new from previous EKG,  otherwise no significant changes     Risk  Prescription drug management.          Diagnosis:    ICD-10-CM    1. Vertigo  R42       2. Tension headache  G44.209           All results reviewed and discussed with patient.  See AVS for detailed discharge instructions for your condition today.    Please follow up with:  No follow-up provider specified.         [1]   Family History  Problem Relation Age of Onset    No Known Problems Father     No Known Problems Mother     Diabetes Other     Cancer Neg     Heart Disorder Neg     Hypertension Neg     Breast Cancer Neg     Ovarian Cancer Neg     Uterine Cancer Neg     Colon Cancer Neg

## 2025-05-21 NOTE — DISCHARGE INSTRUCTIONS
Meclizine 1 tablet every 8 hours as needed for dizziness  Zofran 1 tablet under the tongue every 8 hours as needed for nausea  ER for any new or worsening symptoms follow-up with primary care provider in 2 days if no improvement

## 2025-05-21 NOTE — ED INITIAL ASSESSMENT (HPI)
Pt c/o dizziness, headache, intermittent blurred vision that started while sitting x5 days.  No chest pain.   No SOB.  States flew home from a cruise and dizziness started when she got to airport.   States taking in po fluids and solids well.

## 2025-06-04 ENCOUNTER — TELEMEDICINE (OUTPATIENT)
Dept: FAMILY MEDICINE CLINIC | Facility: CLINIC | Age: 25
End: 2025-06-04
Payer: MEDICAID

## 2025-06-04 DIAGNOSIS — H10.13 ALLERGIC CONJUNCTIVITIS OF BOTH EYES AND RHINITIS: Primary | ICD-10-CM

## 2025-06-04 DIAGNOSIS — J30.9 ALLERGIC CONJUNCTIVITIS OF BOTH EYES AND RHINITIS: Primary | ICD-10-CM

## 2025-06-04 PROCEDURE — 99212 OFFICE O/P EST SF 10 MIN: CPT | Performed by: NURSE PRACTITIONER

## 2025-06-04 RX ORDER — MONTELUKAST SODIUM 10 MG/1
10 TABLET ORAL NIGHTLY
Qty: 90 TABLET | Refills: 1 | Status: SHIPPED | OUTPATIENT
Start: 2025-06-04

## 2025-06-04 RX ORDER — FLUTICASONE PROPIONATE 50 MCG
2 SPRAY, SUSPENSION (ML) NASAL DAILY
Qty: 18.2 ML | Refills: 3 | Status: SHIPPED | OUTPATIENT
Start: 2025-06-04 | End: 2026-05-30

## 2025-06-04 RX ORDER — AZELASTINE HYDROCHLORIDE 0.5 MG/ML
1 SOLUTION/ DROPS OPHTHALMIC 2 TIMES DAILY
Qty: 6 ML | Refills: 3 | Status: SHIPPED | OUTPATIENT
Start: 2025-06-04

## 2025-06-04 RX ORDER — LEVOCETIRIZINE DIHYDROCHLORIDE 5 MG/1
5 TABLET, FILM COATED ORAL EVERY EVENING
Qty: 90 TABLET | Refills: 1 | Status: SHIPPED | OUTPATIENT
Start: 2025-06-04

## 2025-06-04 NOTE — PROGRESS NOTES
HPI    Virtual Telephone/Video Check-In    Anne Walter verbally consents to a Virtual/Telephone Check-In visit on 06/04/25.  Patient has been referred to the [facility] website at [email or web address] to review the yearly Consent to Treat document.    Patient understands and accepts financial responsibility for any deductible, co-insurance and/or co-pays associated with this service.    Duration of the service: 10 minutes      Summary of topics discussed:     Patient presents for concerns of severe allergy symptoms without relief of symptoms.  Took otc clartitin D without relief of symptoms.  Symptoms keep patient awake at night.      Review of Systems   HENT:  Positive for congestion.         Itchy throat.     Eyes:  Positive for redness and itching.        There were no vitals filed for this visit.  There is no height or weight on file to calculate BMI.    Health Maintenance   Topic Date Due    Pneumococcal Vaccine: Birth to 50yrs (1 of 2 - PCV) Never done    Asthma Control Test  04/24/2018    Pap Smear  Never done    DTaP,Tdap,and Td Vaccines (7 - Td or Tdap) 04/29/2020    COVID-19 Vaccine (3 - 2024-25 season) 09/01/2024    Influenza Vaccine (Season Ended) 10/01/2025    Annual Physical  03/27/2026    Annual Depression Screening  Completed    HPV Vaccines  Completed    Meningococcal B Vaccine  Aged Out       Past Medical History[1]    .Past Surgical History[2]    Family History[3]    Social History     Socioeconomic History    Marital status: Single     Spouse name: Not on file    Number of children: Not on file    Years of education: Not on file    Highest education level: Not on file   Occupational History    Not on file   Tobacco Use    Smoking status: Never    Smokeless tobacco: Never   Vaping Use    Vaping status: Never Used   Substance and Sexual Activity    Alcohol use: Not Currently     Comment: occasional    Drug use: Not Currently     Comment: occasional gummies    Sexual activity: Not on file    Other Topics Concern    Not on file   Social History Narrative    Not on file     Social Drivers of Health     Food Insecurity: No Food Insecurity (1/7/2025)    NCSS - Food Insecurity     Worried About Running Out of Food in the Last Year: No     Ran Out of Food in the Last Year: No   Transportation Needs: No Transportation Needs (1/7/2025)    NCSS - Transportation     Lack of Transportation: No   Stress: Not on file   Housing Stability: Not At Risk (1/7/2025)    NCSS - Housing/Utilities     Has Housing: Yes     Worried About Losing Housing: No     Unable to Get Utilities: No       Current Medications[4]    Allergies:  Allergies[5]    Physical Exam  Constitutional:       Appearance: Normal appearance.   Pulmonary:      Effort: No respiratory distress.   Neurological:      Mental Status: She is alert and oriented to person, place, and time.          Assessment and Plan:   Problem List Items Addressed This Visit    None  Visit Diagnoses         Allergic conjunctivitis of both eyes and rhinitis    -  Primary    Relevant Medications    montelukast 10 MG Oral Tab    levocetirizine 5 MG Oral Tab    Azelastine HCl 0.05 % Ophthalmic Solution    fluticasone propionate 50 MCG/ACT Nasal Suspension           Xyzal, Singulair, azelastine, Flonase daily.  Supportive care discussed.  Follow-up as needed.    Discussed plan of care with patient and patient is in agreement.  All questions answered. Patient to call with questions or concerns.    Encouraged to sign up for My Chart if not already registered.          [1]   Past Medical History:   Asthma (HCC)    Obesity    Osteoarthritis   [2]   Past Surgical History:  Procedure Laterality Date    Other surgical history  01/09/2023    Right ankle arthroscopy with synovectomy    Tear duct system surg unlisted  2002   [3]   Family History  Problem Relation Age of Onset    No Known Problems Father     No Known Problems Mother     Diabetes Other     Cancer Neg     Heart Disorder Neg      Hypertension Neg     Breast Cancer Neg     Ovarian Cancer Neg     Uterine Cancer Neg     Colon Cancer Neg    [4]   Current Outpatient Medications   Medication Sig Dispense Refill    montelukast 10 MG Oral Tab Take 1 tablet (10 mg total) by mouth nightly. 90 tablet 1    levocetirizine 5 MG Oral Tab Take 1 tablet (5 mg total) by mouth every evening. 90 tablet 1    Azelastine HCl 0.05 % Ophthalmic Solution Place 1 drop into both eyes 2 (two) times daily. 6 mL 3    fluticasone propionate 50 MCG/ACT Nasal Suspension 2 sprays by Each Nare route daily. 18.2 mL 3    meclizine 25 MG Oral Tab Take 1 tablet (25 mg total) by mouth 3 (three) times daily as needed. 30 tablet 0    IBUPROFEN 800 MG Oral Tab TAKE 1 TABLET(800 MG) BY MOUTH EVERY 6 HOURS AS NEEDED FOR PAIN 60 tablet 1    Budesonide-Formoterol Fumarate (SYMBICORT) 160-4.5 MCG/ACT Inhalation Aerosol Inhale 2 puffs into the lungs 2 (two) times daily. 10.2 g 2    Spacer/Aero-Holding Chambers Does not apply Device Use with albuterol inhaler 1 each 0    albuterol (2.5 MG/3ML) 0.083% Inhalation Nebu Soln TAKE 3 ML BY NEBULIZATION EVERY 4 HOURS AS NEEDED FOR WHEEZING AND SHORTNESS OF BREATH 90 mL 2    Respiratory Therapy Supplies (NEBULIZER/TUBING/MOUTHPIECE) Does not apply Kit Use as needed with nebulizer machine and albuterol solution every 4-6 hours for wheezing or shortness of breath 1 Package 0    Spacer/Aero-Holding Chambers Does not apply Device Use with albuterol inhaler 1 Device 0   [5] No Known Allergies

## 2025-06-05 ENCOUNTER — TELEPHONE (OUTPATIENT)
Dept: FAMILY MEDICINE CLINIC | Facility: CLINIC | Age: 25
End: 2025-06-05

## 2025-06-05 NOTE — TELEPHONE ENCOUNTER
Patient calling to state was told by pharmacy a prior authorization was needed for all allergy medications sent on 06/04/25, could not verify medication  name.

## 2025-06-05 NOTE — TELEPHONE ENCOUNTER
Called [facility] confirmed patient name and date of birth.  Per pharmacy staff member call needed to be transferred   Re confirmed patient name and date of birth     Patient picked up medication montelukast, azelastine, fluticasone  Levocetrizine not covered, is patient okay paying out of pocket or on discount card?     Pharmacy benefits confirmed with pharmacy staff member     Medicaid  ID [id number]  BIN [id number]

## 2025-06-05 NOTE — TELEPHONE ENCOUNTER
I did inform patient at the time of appointment that Xyzal is sold over-the-counter and may not be covered.  She may purchase the generic form out-of-pocket.  Please inform patient.

## 2025-06-23 ENCOUNTER — HOSPITAL ENCOUNTER (EMERGENCY)
Facility: HOSPITAL | Age: 25
Discharge: STILL ADMITTED OR ADMINISTRATIVE | End: 2025-06-24
Payer: MEDICAID

## 2025-06-23 ENCOUNTER — HOSPITAL ENCOUNTER (OUTPATIENT)
Age: 25
Discharge: ANOTHER HOSPITAL | End: 2025-06-23
Payer: MEDICAID

## 2025-06-23 VITALS
HEART RATE: 92 BPM | TEMPERATURE: 99 F | SYSTOLIC BLOOD PRESSURE: 134 MMHG | RESPIRATION RATE: 18 BRPM | OXYGEN SATURATION: 96 % | DIASTOLIC BLOOD PRESSURE: 69 MMHG

## 2025-06-23 DIAGNOSIS — R09.A2 GLOBUS SENSATION: Primary | ICD-10-CM

## 2025-06-23 PROCEDURE — 99215 OFFICE O/P EST HI 40 MIN: CPT | Performed by: PHYSICIAN ASSISTANT

## 2025-06-23 NOTE — ED PROVIDER NOTES
Patient Seen in: [facility]        History  Chief Complaint   Patient presents with    Throat Problem     Stated Complaint: Something stuck in throat making difficult to breathe    Subjective:   HPI            Patient is a 24-year-old female who presents to immediate care due to globus sensation x 1 week.  Patient denies known injury or possible retained foreign body.  States that when she eats and drinks she feels something stuck in her throat.  Denies recent fever cough or sore throat.  Denies having symptoms like this previously.      Objective:     Past Medical History:    Asthma (HCC)    Obesity    Osteoarthritis              Past Surgical History:   Procedure Laterality Date    Other surgical history  01/09/2023    Right ankle arthroscopy with synovectomy    Tear duct system surg unlisted  2002                Social History     Socioeconomic History    Marital status: Single   Tobacco Use    Smoking status: Never    Smokeless tobacco: Never   Vaping Use    Vaping status: Never Used   Substance and Sexual Activity    Alcohol use: Not Currently     Comment: occasional    Drug use: Not Currently     Comment: occasional gummies     Social Drivers of Health     Food Insecurity: No Food Insecurity (1/7/2025)    NCSS - Food Insecurity     Worried About Running Out of Food in the Last Year: No     Ran Out of Food in the Last Year: No   Transportation Needs: No Transportation Needs (1/7/2025)    NCSS - Transportation     Lack of Transportation: No   Housing Stability: Not At Risk (1/7/2025)    NCSS - Housing/Utilities     Has Housing: Yes     Worried About Losing Housing: No     Unable to Get Utilities: No              Review of Systems    Positive for stated complaint: Something stuck in throat making difficult to breathe  Other systems are as noted in HPI.  Constitutional and vital signs reviewed.      All other systems reviewed and negative except as noted above.                  Physical Exam    ED  Triage Vitals [06/23/25 1613]   /69   Pulse 92   Resp 18   Temp 98.7 °F (37.1 °C)   Temp src Oral   SpO2 96 %   O2 Device None (Room air)       Current Vitals:   Vital Signs  BP: 134/69  Pulse: 92  Resp: 18  Temp: 98.7 °F (37.1 °C)  Temp src: Oral    Oxygen Therapy  SpO2: 96 %  O2 Device: None (Room air)            Physical Exam  Vital signs reviewed. Nursing note reviewed.  Constitutional: Well-developed. Well-nourished. In no acute distress  HENT: Mucous membranes moist.  No trismus.  Uvula midline.  No posterior pharynx edema.  EYES: No scleral icterus or conjunctival injection.  NECK: Full ROM. Supple.   CARDIAC: Normal rate. Normal S1/ S2. 2+ distal pulses. No edema  PULM/CHEST: Clear to auscultation bilaterally. No wheezes  Extremities: Full ROM  NEURO: Awake, alert, following commands, moving extremities, answering questions.   SKIN: Warm and dry. No rash or lesions.  PSYCH: Normal judgment. Normal affect.                        MDM     Patient is a 24-year-old female that presents to immediate care due to globus sensation x 1 week.  Patient arrives with stable vitals, speaking complete sentences in no respiratory distress.  Physical exam unremarkable with no posterior pharynx edema erythema or palpable neck mass.  Discussed with patient outpatient imaging and labs however patient declined stating that she would prefer going to ED \"later on today\"..  History given by patient .  Care discussed with Dr. Farley.  Differential diagnosis include globus sensation, retained foreign body, retropharyngeal abscess.         Medical Decision Making      Disposition and Plan     Clinical Impression:  1. Globus sensation         Disposition:  Ic to ed  6/23/2025  4:41 pm    Follow-up:  No follow-up provider specified.        Medications Prescribed:  Discharge Medication List as of 6/23/2025  4:41 PM                Supplementary Documentation:

## 2025-06-23 NOTE — ED INITIAL ASSESSMENT (HPI)
Pt reports feeling of something being stuck in throat x1 week. Pt speaking in full clear sentences.

## 2025-06-24 ENCOUNTER — HOSPITAL ENCOUNTER (EMERGENCY)
Facility: HOSPITAL | Age: 25
Discharge: HOME | End: 2025-06-24
Attending: STUDENT IN AN ORGANIZED HEALTH CARE EDUCATION/TRAINING PROGRAM
Payer: MEDICAID

## 2025-06-24 VITALS
SYSTOLIC BLOOD PRESSURE: 144 MMHG | HEART RATE: 91 BPM | RESPIRATION RATE: 16 BRPM | OXYGEN SATURATION: 100 % | TEMPERATURE: 98 F | DIASTOLIC BLOOD PRESSURE: 92 MMHG

## 2025-06-24 DIAGNOSIS — R09.A2 GLOBUS SENSATION: Primary | ICD-10-CM

## 2025-06-24 PROCEDURE — 99283 EMERGENCY DEPT VISIT LOW MDM: CPT

## 2025-06-24 PROCEDURE — 87430 STREP A AG IA: CPT | Performed by: STUDENT IN AN ORGANIZED HEALTH CARE EDUCATION/TRAINING PROGRAM

## 2025-06-25 NOTE — DISCHARGE INSTRUCTIONS
Call ENT for follow-up  Take Tylenol or ibuprofen as needed for pain  Return to the ER for worsening swelling difficulty speaking or swallowing or any new concerns

## 2025-06-26 NOTE — ED PROVIDER NOTES
Patient Seen in: [facility] Emergency Department        History  Chief Complaint   Patient presents with    Sore Throat     Stated Complaint: sore throat    Subjective:   HPI            24-year-old female history of asthma obesity ASHLEY, presenting for evaluation of sore throat.  Describes a 1 week history of sensation of \"rocks in her throat\" when swallowing.  No fevers or chills.  Was seen at outside hospital and at immediate care on 6/23 and 6/25 for same.  At outside hospital ER had x-ray to evaluate for radiopaque foreign body.  Has some occasional cough.  Has been advised on ENT follow-up.  Has painful swallowing but no choking or inability to swallow liquids or solids.      Objective:     Past Medical History:    Asthma (HCC)    Obesity    Osteoarthritis              Past Surgical History:   Procedure Laterality Date    Other surgical history  01/09/2023    Right ankle arthroscopy with synovectomy    Tear duct system surg unlisted  2002                Social History     Socioeconomic History    Marital status: Single   Tobacco Use    Smoking status: Never    Smokeless tobacco: Never   Vaping Use    Vaping status: Never Used   Substance and Sexual Activity    Alcohol use: Not Currently     Comment: occasional    Drug use: Not Currently     Comment: occasional gummies     Social Drivers of Health     Food Insecurity: No Food Insecurity (1/7/2025)    NCSS - Food Insecurity     Worried About Running Out of Food in the Last Year: No     Ran Out of Food in the Last Year: No   Transportation Needs: No Transportation Needs (1/7/2025)    NCSS - Transportation     Lack of Transportation: No   Housing Stability: Not At Risk (1/7/2025)    NCSS - Housing/Utilities     Has Housing: Yes     Worried About Losing Housing: No     Unable to Get Utilities: No                                Physical Exam    ED Triage Vitals   BP 06/24/25 1952 (!) 144/92   Pulse 06/24/25 1951 91   Resp 06/24/25 1951 16   Temp 06/24/25 1951  98.3 °F (36.8 °C)   Temp src 06/24/25 1951 Oral   SpO2 06/24/25 1951 100 %   O2 Device 06/24/25 1951 None (Room air)       Current Vitals:   Vital Signs  BP: (!) 144/92  Pulse: 91  Resp: 16  Temp: 98.3 °F (36.8 °C)  Temp src: Oral    Oxygen Therapy  SpO2: 100 %  O2 Device: None (Room air)            Physical Exam  Constitutional: awake, alert, no sig distress  HENT: mmm, no lesions,  -No palpable neck mass  -Posterior pharynx is grossly unremarkable on inspection  Neck: normal range of motion, no tenderness, supple.  Eyes: PERRL, EOMI, conjunctiva normal, no discharge. Sclera anicteric.  Cardiovascular: rr no murmur  Respiratory: Normal breath sounds, no respiratory distress, no wheezing, no chest tenderness.  GI: Bowel sounds normal, Soft, no tenderness, no masses, no pulsatile masses.  : No CVA tenderness.  Skin: Warm, dry, no erythema, no rash.  Musculoskeletal: Intact distal pulses, no edema, no tenderness, no cyanosis, no clubbing. Good range of motion in all major joints. No tenderness to palpation or major deformities noted. Back- No tenderness.  Neurologic: Alert & oriented x 3, normal motor function, normal sensory function, no focal deficits noted.  Psych: Calm, cooperative, nl affect        ED Course  Labs Reviewed   RAPID STREP A SCREEN (LC) - Normal    Narrative:     A confirmatory culture is recommended if clinically indicated.                            MDM     24-year-old female with history as documented above presenting with painful swelling.  Arrival vital stable reassuring  No evidence for acute infection, no evidence airway compromise, no stridor troponin joint  Ddx: Globus sensation, strep pharyngitis viral pharyngitis, consider pharyngeal mass    Advised on ENT follow-up.  Return precautions and follow-up instructions were discussed with patient who voiced understanding and agreement the plan.  All questions were answered to patient satisfaction.        MDM    Disposition and Plan      Clinical Impression:  1. Globus sensation         Disposition:  Discharge  6/24/2025  8:15 pm    Follow-up:  Azeem Johnston MD  [address]  [facility] [address]  [phone]    Call today      [facility]  [address] [facility] [address]  [phone]  Follow up  As needed, If symptoms worsen          Medications Prescribed:  Discharge Medication List as of 6/24/2025  8:19 PM                Supplementary Documentation:

## 2025-06-28 ENCOUNTER — HOSPITAL ENCOUNTER (EMERGENCY)
Facility: HOSPITAL | Age: 25
Discharge: HOME | End: 2025-06-28
Attending: EMERGENCY MEDICINE
Payer: MEDICAID

## 2025-06-28 ENCOUNTER — APPOINTMENT (OUTPATIENT)
Dept: GENERAL RADIOLOGY | Facility: HOSPITAL | Age: 25
End: 2025-06-28
Payer: MEDICAID

## 2025-06-28 VITALS
OXYGEN SATURATION: 100 % | TEMPERATURE: 97 F | DIASTOLIC BLOOD PRESSURE: 88 MMHG | SYSTOLIC BLOOD PRESSURE: 134 MMHG | RESPIRATION RATE: 20 BRPM | HEART RATE: 99 BPM

## 2025-06-28 DIAGNOSIS — J45.41 MODERATE PERSISTENT ASTHMA WITH EXACERBATION (HCC): ICD-10-CM

## 2025-06-28 DIAGNOSIS — J06.9 VIRAL URI WITH COUGH: Primary | ICD-10-CM

## 2025-06-28 PROCEDURE — 99284 EMERGENCY DEPT VISIT MOD MDM: CPT

## 2025-06-28 PROCEDURE — 94640 AIRWAY INHALATION TREATMENT: CPT

## 2025-06-28 PROCEDURE — 71045 X-RAY EXAM CHEST 1 VIEW: CPT

## 2025-06-28 RX ORDER — BENZONATATE 100 MG/1
100 CAPSULE ORAL 3 TIMES DAILY PRN
Qty: 30 CAPSULE | Refills: 0 | Status: SHIPPED | OUTPATIENT
Start: 2025-06-28 | End: 2025-07-28

## 2025-06-28 RX ORDER — ALBUTEROL SULFATE 0.83 MG/ML
2.5 SOLUTION RESPIRATORY (INHALATION) EVERY 4 HOURS PRN
Qty: 30 EACH | Refills: 0 | Status: SHIPPED | OUTPATIENT
Start: 2025-06-28 | End: 2025-07-28

## 2025-06-28 RX ORDER — IPRATROPIUM BROMIDE AND ALBUTEROL SULFATE 2.5; .5 MG/3ML; MG/3ML
3 SOLUTION RESPIRATORY (INHALATION) ONCE
Status: COMPLETED | OUTPATIENT
Start: 2025-06-28 | End: 2025-06-28

## 2025-06-28 RX ORDER — PREDNISONE 20 MG/1
60 TABLET ORAL ONCE
Status: COMPLETED | OUTPATIENT
Start: 2025-06-28 | End: 2025-06-28

## 2025-06-28 RX ORDER — ALBUTEROL SULFATE 5 MG/ML
10 SOLUTION RESPIRATORY (INHALATION) CONTINUOUS
Status: DISCONTINUED | OUTPATIENT
Start: 2025-06-28 | End: 2025-06-28

## 2025-06-28 RX ORDER — PREDNISONE 20 MG/1
40 TABLET ORAL DAILY
Qty: 8 TABLET | Refills: 0 | Status: SHIPPED | OUTPATIENT
Start: 2025-06-28 | End: 2025-07-02

## 2025-06-28 NOTE — ED PROVIDER NOTES
Patient Seen in: [facility] Emergency Department        History  Chief Complaint   Patient presents with    Asthma    Difficulty Breathing     Stated Complaint: asthma    Subjective:   HPI            Presents emergency department complaining of asthma exacerbation.  She states she has had increasing wheezing over the last few days with coughing.  She states that because she was coming here she has not given herself her albuterol nebulizer or inhaler.  She states she was last on prednisone 3 months ago.  She denies chest pain.  There is no other aggravating or alleviating factors.      Objective:     Past Medical History:    Asthma (HCC)    Obesity    Osteoarthritis              Past Surgical History:   Procedure Laterality Date    Other surgical history  01/09/2023    Right ankle arthroscopy with synovectomy    Tear duct system surg unlisted  2002                Social History     Socioeconomic History    Marital status: Single   Tobacco Use    Smoking status: Never    Smokeless tobacco: Never   Vaping Use    Vaping status: Never Used   Substance and Sexual Activity    Alcohol use: Not Currently     Comment: occasional    Drug use: Not Currently     Comment: occasional gummies     Social Drivers of Health     Food Insecurity: No Food Insecurity (1/7/2025)    NCSS - Food Insecurity     Worried About Running Out of Food in the Last Year: No     Ran Out of Food in the Last Year: No   Transportation Needs: No Transportation Needs (1/7/2025)    NCSS - Transportation     Lack of Transportation: No   Housing Stability: Not At Risk (1/7/2025)    NCSS - Housing/Utilities     Has Housing: Yes     Worried About Losing Housing: No     Unable to Get Utilities: No                                Physical Exam    ED Triage Vitals [06/28/25 1341]   /58   Pulse 107   Resp 24   Temp 97.2 °F (36.2 °C)   Temp src Temporal   SpO2 100 %   O2 Device None (Room air)       Current Vitals:   Vital Signs  BP: 143/58  Pulse:  107  Resp: 24  Temp: 97.2 °F (36.2 °C)  Temp src: Temporal    Oxygen Therapy  SpO2: 100 %  O2 Device: None (Room air)            Physical Exam  Vitals and nursing note reviewed.   Constitutional:       General: She is not in acute distress.     Appearance: She is well-developed. She is obese.   HENT:      Head: Normocephalic.      Nose: Nose normal.      Mouth/Throat:      Mouth: Mucous membranes are moist.   Eyes:      Conjunctiva/sclera: Conjunctivae normal.   Cardiovascular:      Rate and Rhythm: Normal rate and regular rhythm.      Heart sounds: No murmur heard.  Pulmonary:      Effort: Pulmonary effort is normal. No respiratory distress.      Breath sounds: Wheezing present.   Abdominal:      General: There is no distension.      Palpations: Abdomen is soft.      Tenderness: There is no abdominal tenderness.   Musculoskeletal:         General: No tenderness. Normal range of motion.      Cervical back: Normal range of motion and neck supple.   Skin:     General: Skin is warm and dry.      Findings: No rash.   Neurological:      General: No focal deficit present.      Mental Status: She is alert. She is disoriented.                 ED Course  Labs Reviewed - No data to display                         MDM             Medical Decision Making  Differential diagnosis considered for pneumonia, bronchitis, asthma exacerbation.    Problems Addressed:  Moderate persistent asthma with exacerbation (HCC): acute illness or injury  Viral URI with cough: acute illness or injury    Amount and/or Complexity of Data Reviewed  Radiology: ordered and independent interpretation performed. Decision-making details documented in ED Course.     Details: Chest x-ray shows no evidence of pneumonia or pleural effusion.  Discussion of management or test interpretation with external provider(s): Patient received a continuous nebulizer treatment with significant improvement of symptoms.  No wheezing on reexamination.  She denies chest pain  or shortness of breath.  She does complain of a persistent cough.  She will be prescribed Tessalon Perles, prednisone and refill of her nebulizer solution.    Risk  Prescription drug management.        Disposition and Plan     Clinical Impression:  1. Viral URI with cough    2. Moderate persistent asthma with exacerbation (HCC)         Disposition:  Discharge  6/28/2025  5:50 pm    Follow-up:  Nonstaff, Physician    Schedule an appointment as soon as possible for a visit            Medications Prescribed:  Current Discharge Medication List        START taking these medications    Details   !! albuterol (2.5 MG/3ML) 0.083% Inhalation Nebu Soln Take 3 mL (2.5 mg total) by nebulization every 4 (four) hours as needed for Wheezing or Shortness of Breath.  Qty: 30 each, Refills: 0      benzonatate 100 MG Oral Cap Take 1 capsule (100 mg total) by mouth 3 (three) times daily as needed for cough.  Qty: 30 capsule, Refills: 0      predniSONE 20 MG Oral Tab Take 2 tablets (40 mg total) by mouth daily for 4 days.  Qty: 8 tablet, Refills: 0       !! - Potential duplicate medications found. Please discuss with provider.                Supplementary Documentation:

## 2025-06-28 NOTE — ED INITIAL ASSESSMENT (HPI)
Pt presents to ED for asthma for one weeks. Pt having david, cough and wheezing. Denies fevers. Denies chest pain. Pt had neb tx sat home yesterday with no improvement

## 2025-06-30 ENCOUNTER — OFFICE VISIT (OUTPATIENT)
Dept: OTOLARYNGOLOGY | Facility: CLINIC | Age: 25
End: 2025-06-30
Payer: MEDICAID

## 2025-06-30 DIAGNOSIS — R09.82 PND (POST-NASAL DRIP): Primary | ICD-10-CM

## 2025-06-30 DIAGNOSIS — R13.10 DYSPHAGIA, UNSPECIFIED TYPE: ICD-10-CM

## 2025-06-30 DIAGNOSIS — J01.90 ACUTE SINUSITIS, RECURRENCE NOT SPECIFIED, UNSPECIFIED LOCATION: ICD-10-CM

## 2025-06-30 DIAGNOSIS — R07.0 THROAT PAIN: ICD-10-CM

## 2025-06-30 PROCEDURE — 31575 DIAGNOSTIC LARYNGOSCOPY: CPT | Performed by: STUDENT IN AN ORGANIZED HEALTH CARE EDUCATION/TRAINING PROGRAM

## 2025-06-30 PROCEDURE — 99214 OFFICE O/P EST MOD 30 MIN: CPT | Performed by: STUDENT IN AN ORGANIZED HEALTH CARE EDUCATION/TRAINING PROGRAM

## 2025-06-30 RX ORDER — MONTELUKAST SODIUM 10 MG/1
10 TABLET ORAL NIGHTLY
Qty: 30 TABLET | Refills: 3 | Status: SHIPPED | OUTPATIENT
Start: 2025-06-30

## 2025-06-30 RX ORDER — AZELASTINE 1 MG/ML
2 SPRAY, METERED NASAL 2 TIMES DAILY
Qty: 30 ML | Refills: 0 | Status: SHIPPED | OUTPATIENT
Start: 2025-06-30

## 2025-06-30 RX ORDER — OMEPRAZOLE 20 MG/1
20 CAPSULE, DELAYED RELEASE ORAL
COMMUNITY
Start: 2025-06-23 | End: 2026-06-23

## 2025-06-30 NOTE — PROGRESS NOTES
Anne Walter is a 24 year old female.   Chief Complaint   Patient presents with    Throat Problem     Pt reports feeling of something caught in throat x3 weeks     HPI:   24-year-old presents with a feeling of soreness and that there is something in the back of her throat.  Has been going on for about 1 to 2 weeks.  Also has a history of allergies and asthma.  Strep test was negative on June 24.  Had an unremarkable chest x-ray on June 28.  Was seen in the ER on June 28 and given albuterol, Tessalon Perles and prednisone but she says that she has not really picked up the medication or started this yet.    Current Medications[1]   Past Medical History[2]   Social History:  Short Social Hx on File[3]   Past Surgical History[4]      EXAM:   LMP 05/17/2025 (Approximate)     System Details   Skin Inspection - Normal.   Constitutional Overall appearance - Normal.   Head/Face Symmetric, TMJ tenderness not present    Eyes EOMI, PERRL   Right ear:  Canal clear, TM intact, no TIARRA   Left ear:  Canal clear, TM intact, no TIARRA   Nose: Septum midline, inferior turbinates not enlarged, nasal valves without collapse    Oral cavity/Oropharynx: No lesions or masses on inspection or palpation, tonsils symmetric    Neck: Soft without LAD, thyroid not enlarged  Voice clear/ no stridor   Other:      SCOPES AND PROCEDURES:       Flexible Laryngoscopy Procedure Note (22472)    Due to inability for adequate examination of the larynx and need for magnification to perform the examination, endoscopy was performed.  Risks and benefits were discussed with patient/family and they have given verbal consent to proceed.    Pre-operative Diagnosis:   1. PND (post-nasal drip)    2. Acute sinusitis, recurrence not specified, unspecified location    3. Dysphagia, unspecified type    4. Throat pain        Post-operative Diagnosis: Same    Procedure: Diagnostic flexible fiberoptic laryngoscopy    Anesthesia: Topical anesthetic Rio     Surgeon Luke  Preston LESTER    EBL: 0cc    Procedure Detail & Findings:     After placement of topical anesthetic intranasally the flexible laryngoscope was inserted into the nare and driven through the nasal cavity with no significant abnormal findings noted in the nasal cavities or nasopharynx. The oropharynx, hypopharynx and larynx were subsequently examined and the following findings were noted:    Inflamed adenoids.  Appears to be adenoiditis possibly even sinusitis with postnasal drip and pharyngeal cobblestoning.  Slight bulging of the right posterior pharynx      Base of Tongue: Normal        Valeculla: Normal        Arytenoids: Normal/Erythemous: Erythmous        Introitus of the esophagus: Normal        Epiglottis: Normal        False vocal cords: Normal        True vocal cords: Normal        Subglottic space: Normal        Mobility of True vocal cords: Normal    Condition: Stable    Complications: Patient tolerated the procedure well with no immediate complication.    Azeem Johnston MD    AUDIOGRAM AND IMAGING:         IMPRESSION:   1. PND (post-nasal drip)  - loratadine-pseudoephedrine ER  MG Oral Tablet 24 Hr; Take 1 tablet by mouth at bedtime.  Dispense: 30 tablet; Refill: 1    2. Acute sinusitis, recurrence not specified, unspecified location    3. Dysphagia, unspecified type    4. Throat pain       Recommendations:  - Endoscopy with very inflamed adenoids and mucopurulence within the posterior nasal cavity possible indicative of an adenoiditis in the setting of allergies and postnasal drip or possibly sinusitis.  This may be leading to this throat discomfort  - She has not been on oral antibiotics will begin on a course of oral Augmentin  - Will also prescribe a stronger allergy regimen with Claritin-D, montelukast and Astelin nasal spray  - Will currently avoid additional oral steroids but may consider this or budesonide rinses in the future  - She will follow-up in 3 weeks if still not improving could consider  imaging as well of her sinuses or neck if still having this issue    The patient indicates understanding of these issues and agrees to the plan.      Azeem Johnston MD  6/30/2025  12:32 PM       [1]   Current Outpatient Medications   Medication Sig Dispense Refill    omeprazole 20 MG Oral Capsule Delayed Release Take 1 capsule (20 mg total) by mouth.      amoxicillin clavulanate 875-125 MG Oral Tab Take 1 tablet by mouth every 12 (twelve) hours for 10 days. 20 tablet 0    loratadine-pseudoephedrine ER  MG Oral Tablet 24 Hr Take 1 tablet by mouth at bedtime. 30 tablet 1    azelastine 0.1 % Nasal Solution 2 sprays by Nasal route 2 (two) times daily. 30 mL 0    montelukast 10 MG Oral Tab Take 1 tablet (10 mg total) by mouth nightly. 30 tablet 3    albuterol (2.5 MG/3ML) 0.083% Inhalation Nebu Soln Take 3 mL (2.5 mg total) by nebulization every 4 (four) hours as needed for Wheezing or Shortness of Breath. 30 each 0    benzonatate 100 MG Oral Cap Take 1 capsule (100 mg total) by mouth 3 (three) times daily as needed for cough. 30 capsule 0    predniSONE 20 MG Oral Tab Take 2 tablets (40 mg total) by mouth daily for 4 days. 8 tablet 0    albuterol 108 (90 Base) MCG/ACT Inhalation Aero Soln Inhale 2 puffs into the lungs every 4 (four) hours as needed for Wheezing or Shortness of Breath.      naproxen 500 MG Oral Tab Take 1 tablet (500 mg total) by mouth 2 (two) times daily as needed. 60 tablet 1    montelukast 10 MG Oral Tab Take 1 tablet (10 mg total) by mouth nightly. 90 tablet 1    levocetirizine 5 MG Oral Tab Take 1 tablet (5 mg total) by mouth every evening. 90 tablet 1    Azelastine HCl 0.05 % Ophthalmic Solution Place 1 drop into both eyes 2 (two) times daily. 6 mL 3    fluticasone propionate 50 MCG/ACT Nasal Suspension 2 sprays by Each Nare route daily. 18.2 mL 3    meclizine 25 MG Oral Tab Take 1 tablet (25 mg total) by mouth 3 (three) times daily as needed. 30 tablet 0    IBUPROFEN 800 MG Oral Tab TAKE 1  TABLET(800 MG) BY MOUTH EVERY 6 HOURS AS NEEDED FOR PAIN 60 tablet 1    Budesonide-Formoterol Fumarate (SYMBICORT) 160-4.5 MCG/ACT Inhalation Aerosol Inhale 2 puffs into the lungs 2 (two) times daily. 10.2 g 2    Spacer/Aero-Holding Chambers Does not apply Device Use with albuterol inhaler 1 each 0    albuterol (2.5 MG/3ML) 0.083% Inhalation Nebu Soln TAKE 3 ML BY NEBULIZATION EVERY 4 HOURS AS NEEDED FOR WHEEZING AND SHORTNESS OF BREATH 90 mL 2    Respiratory Therapy Supplies (NEBULIZER/TUBING/MOUTHPIECE) Does not apply Kit Use as needed with nebulizer machine and albuterol solution every 4-6 hours for wheezing or shortness of breath 1 Package 0    Spacer/Aero-Holding Chambers Does not apply Device Use with albuterol inhaler 1 Device 0   [2]   Past Medical History:   Asthma (HCC)    Obesity    Osteoarthritis   [3]   Social History  Socioeconomic History    Marital status: Single   Tobacco Use    Smoking status: Never    Smokeless tobacco: Never   Vaping Use    Vaping status: Never Used   Substance and Sexual Activity    Alcohol use: Not Currently     Comment: occasional    Drug use: Not Currently     Comment: occasional gummies     Social Drivers of Health     Food Insecurity: No Food Insecurity (1/7/2025)    NCSS - Food Insecurity     Worried About Running Out of Food in the Last Year: No     Ran Out of Food in the Last Year: No   Transportation Needs: No Transportation Needs (1/7/2025)    NCSS - Transportation     Lack of Transportation: No   Housing Stability: Not At Risk (1/7/2025)    NCSS - Housing/Utilities     Has Housing: Yes     Worried About Losing Housing: No     Unable to Get Utilities: No   [4]   Past Surgical History:  Procedure Laterality Date    Other surgical history  01/09/2023    Right ankle arthroscopy with synovectomy    Tear duct system surg unlisted  2002

## (undated) DEVICE — COTTON ROLL: Brand: DEROYAL

## (undated) DEVICE — APPLICATOR CHLORAPREP 26ML

## (undated) DEVICE — NEEDLE SPINAL 18X3-1/2 PINK.

## (undated) DEVICE — TUBING IRR 16FT CNT WV 3 ASCP

## (undated) DEVICE — SOLUTION  .9 3000ML

## (undated) DEVICE — SPLINT PRECUT SYNTH 4X30

## (undated) DEVICE — SUT ETHILON 4-0 FS-2 662G

## (undated) DEVICE — GAMMEX® NON-LATEX PI ORTHO SIZE 6.5, STERILE POLYISOPRENE POWDER-FREE SURGICAL GLOVE: Brand: GAMMEX

## (undated) DEVICE — STERILE COTTON ROLL 1LB 1X8.5

## (undated) DEVICE — BLADE 11 SHRP BP SS SRG STRL

## (undated) DEVICE — C-ARM: Brand: UNBRANDED

## (undated) DEVICE — GUHL ANKLE DISTRACTOR FOOT STRAPS,                                    STERILE, LATEX FREE BOX OF 6

## (undated) DEVICE — CURAD OIL EMLUSION GAUZE 3X16

## (undated) DEVICE — Device: Brand: ACCUPORT® CANNULA

## (undated) DEVICE — SUT VICRYL 0 CT-1 JJ31G

## (undated) DEVICE — SUT VICRYL 2-0 CT-1  JJ42G

## (undated) DEVICE — SUCTION CANISTER, 3000CC,SAFELINER: Brand: DEROYAL

## (undated) DEVICE — LOWER EXTREMITY: Brand: MEDLINE INDUSTRIES, INC.

## (undated) DEVICE — BLADE SHVR COOLCUT 13CM 4MM

## (undated) DEVICE — SUT MONOCRYL 3-0 PS-2 Y497G

## (undated) DEVICE — 3M™ STERI-DRAPE™ U-DRAPE 1015: Brand: STERI-DRAPE™

## (undated) NOTE — LETTER
Date & Time: 2/25/2025, 7:42 PM  Patient: Anne Walter  Encounter Provider(s):    Alena Mcarthur APRN       To Whom It May Concern:    Anne Walter was seen and treated in our department on 2/25/2025. She should not return to work until 2/27/2025 .    If you have any questions or concerns, please do not hesitate to call.        _____________________________  SHEBA Salas

## (undated) NOTE — LETTER
9/27/2023          To Whom It May Concern:    Anne Talavera is currently under my medical care and may return to work with the following restrictions: No lifting/pushing/pulling over 20 pounds. She must also elevate her right foot/ankle while sitting. We will maintain these restrictions for the next 6 months. If you require additional information please contact our office.         Sincerely,    Nathanael Vazquez, DO

## (undated) NOTE — LETTER
7/26/2023          To Whom It May Concern:    Anne Jeter is currently under my medical care and may return to work with the following restrictions: she may return to light duty; she may stand for 10 minutes every hour, she may not do lifting over 10 lbs . All this restrictions are in place for 2 months when she will be reevaluated by me. If you require additional information please contact our office.         Sincerely,    Negrito Echavarria, DO

## (undated) NOTE — ED AVS SNAPSHOT
[facility] AND [facility] Immediate Care in [facility] 18.  [address] [facility]    Phone:  [phone]    Fax:  [phone]           Monae Frederick   MRN: [id number]    Department:  [facility] AND [facility] Immediate Care in [address] [place] [address]   Date of Visit: Bring a paper prescription for each of these medications    - Albuterol Sulfate  (90 Base) MCG/ACT Aers  - predniSONE 20 MG Tabs              Discharge Instructions       Take the steroids as prescribed.   Your first dose was in the immediate care c does not uncover every injury or illness.  If you have been referred to a primary care or a specialist physician for a follow-up visit, please tell this physician (or your personal doctor if your instructions are to return to your personal doctor) about any [address] ([address]) [phone]   [place] [address] [employer]. ([address] [facility]) [facility] [employer].  ([address] [facility] If you have questions, you can call [phone] to talk to our [facility] Staff. Remember, [email or web address] is NOT to be used for urgent needs. For medical emergencies, dial 911.

## (undated) NOTE — LETTER
IMMEDIATE CARE [facility]  [address]  [phone]     Patient: Crystal Newton   YOB: 2000   Date of Visit: 11/12/2020     Dear Employer,        November 12, 2020    At [facility], we are taking special p Persons infected with SARS-CoV-2 who never develop COVID-19 symptoms may discontinue isolation and other precautions 10 days after the date of their first positive RT-PCR test for SARS-CoV-2 RNA.     Persons who are asymptomatic but have been exposed, CDC r

## (undated) NOTE — LETTER
Date & Time: 8/25/2022, 1:46 AM  Patient: Bonnie Rivera  Encounter Provider(s):    SHEBA Dixon       To Whom It May Concern:    Mariano Bonner was seen and treated in our department on 8/24/2022. She should not return to school until 8/26/2022.     If you have any questions or concerns, please do not hesitate to call.        _____________________________  Physician/APC Signature

## (undated) NOTE — Clinical Note
Ortho Surgery Request  Surgery:  Right ankle arthroscopy with synovectomy, secondary Brostrom repair, subcondroplasty navicular, possible bone graft calcaneus     Surgical Assistant: no Surgery Day Request: next available Estimated time: 90 min Anesthesia type: General + popliteal/saphenous block  Position: lateral on bean bag  Special Needs:[x ] mini C-Arm  Equipment: Guhl ankle distractor with Ferkle thigh ayala Arvell Gautam and Nephew subchondroplasty Artelon Flexband graft  Location:Main OR  Post Op Visit Date: 1 -2 Weeks in [place] CPT Code: 86810, 31619, 26623, 96453     ICD Code: Medical Clearance Needed: Medical Preadmission Testing Orders: Anesthesia testing protocols and anesthesia pre op orders will be used Additional PAT orders: Pre OP Orders: Use the prophylactic antibiotic protocol Additional Pre Op Orders: PCN Allergy: No If Yes:  __X__ Admit: Hospital outpatient surgery

## (undated) NOTE — LETTER
Date & Time: 1/6/2025, 9:34 AM  Patient: Anne Walter  Encounter Provider(s):    Naomi Cassidy PA       To Whom It May Concern:    Anne Walter was seen and treated in our department on 1/6/2025. She may return to work on 1/7/2025.    If you have any questions or concerns, please do not hesitate to call.        _____________________________  Physician/APC Signature

## (undated) NOTE — LETTER
Date & Time: 8/9/2022, 1:31 PM  Patient: Rocio Servin  Encounter Provider(s):    SHEBA Mendoza       To Whom It May Concern:    Tressa Craig was seen and treated in our department on 8/9/2022. She may return to work on 8/10/22. May wear ankle splint for support.      If you have any questions or concerns, please do not hesitate to call.        _____________________________  Physician/APC Signature

## (undated) NOTE — LETTER
Date & Time: 2/1/2024, 2:33 PM  Patient: Anne Walter  Encounter Provider(s):    Eriberto Rodriguez MD       To Whom It May Concern:    Anne Walter was seen and treated in our department on 2/1/2024. She can return to work 2/4/24.    If you have any questions or concerns, please do not hesitate to call.        _____________________________  Physician/APC Signature

## (undated) NOTE — LETTER
Date & Time: 11/28/2022, 9:09 AM  Patient: Jessy Prima  Encounter Provider(s):    SHEBA Knowles       To Whom It May Concern:    Gordon Mccauley was seen and treated in our department on 11/28/2022. She should not return to work until 12/01/2022. If you have any questions or concerns, please do not hesitate to call.     Rodell Skiff, APN    _____________________________  [id number] Signature

## (undated) NOTE — LETTER
8/22/2024              Anne Walter        [address]        [facility] [address]         To Whom it may concern:    This is to certify that Anne Walter had an appointment on 8/22/2024 at 1:45 PM with Azeem Johnston MD.        Sincerely,    Azeem Johnston MD  [address] [id number] [phone]

## (undated) NOTE — LETTER
ASTHMA ACTION PLAN for Anne Christensen     : 2000     Date: 10/07/20  Doctor:  Judson Aguilar PA-C  Phone for doctor or clinic: [place] [address] [address]  [phone]      ACT Sco Inhale 2 puffs into the lungs every 4 (four) hours as needed for Wheezing or Shortness of Breath. Patient not taking: Reported on 10/7/2020                   2. Yellow - Caution.  50-79% Personal Best Peak Flow  Use reliever medicine to keep an asthma If your symptoms do not improve in ONE hour -  go to the emergency room or call 911 immediately! If symptoms improve, call office for appointment immediately.     Albuterol inhaler 2 puffs every 20 minutes for three treatments       Don't forget:  · Rinse

## (undated) NOTE — LETTER
Patient Name: Maximo Jones  YOB: 2000          MRN :  [id number]  Date:  7/25/2023  Referring Physician:  Corina Quarles             Physical Therapy Progress Summary  Diagnosis:  S/p right lateral ligament reconstruction      Next MD visit: July 26, 2023  Fall Risk: standard         Precautions: n/a    Date of Surgery: 1/9/2023       Medication Changes since last visit?: No    Subjective:  Patient reports 5/10 medial right ankle pain today. She reports walking and standing have improved overall, but she will still get intermittent sharp pains. Objective:    7/25/2023: Ankle ROM (R):  DF: R 7 deg  PF: R 58 deg  INV: R 20 deg  EV: R 20 deg    Ankle MMT (R):  DF: R 5/5  PF: R 4/5  INV: R 4/5  EV: R 4+/5     Date: 7/25/2023  Visit #: 24/27 [facility]) exp. 9/1/2023 Date: 7/21/2023  Visit #: 23/27 [facility]) exp. 9/1/2023   HEP   - updated HEP - see pt instructions section    Therapeutic Exercise   - supine R ankle DF/PF/INV/EV with black Tband 3 x 10 ea  - standing B heel raises 3 x 10  - shuttle machine B knee ext/flx 7 bands 2 x 10  - shuttle machine R/L knee ext/flx 5 bands 2 x 10 ea  - standing R/L gastroc stretch at wall 2 x 30 sec holds ea  - standing B ankle DF at wall 2 x 10  - standing B heel raises 3 x 10  - reassessment  X 35 minutes  - walking on TM 1.2 mph x 10 minutes  - supine R ankle DF/PF/INV/EV with black Tband 2 x 10 ea  - sidelying R ankle inversion with 2 x 10  - standing R/L hip abduction/hip extension with blue Tband 2 x 10 ea  - standing B heel raises 3 x 10  - shuttle machine B knee ext/flx 6 bands 3 x 12     Manual Therapy   - supine ankle PF/DF/INV/EV stretching with distraction x 8 minutes    Therapeutic Activity       Modalities       Neuromuscular re-ed  - R SLS on airex 2 x 30 sec holds  - R/L tandem stance on long airex 3 x 20 sec holds each     Assessment:  Advanced resistance on shuttle machine today.   Patient displaying improved R ankle ROM and strength observed this session along with fewer gait deviations. Plan: continue PT     Charges:  Ex 2 Man 1  Total Timed Treatment: 43 min  Total Treatment Time: 43 min    21st Century Cures Act Notice to Patient: Medical documents like this are made available to patients in the interest of transparency. However, be advised this is a medical document and it is intended as lljz-fy-wumi communication between your medical providers. This medical document may contain abbreviations, assessments, medical data, and results or other terms that are unfamiliar. Medical documents are intended to carry relevant information, facts as evident, and the clinical opinion of the practitioner. As such, this medical document may be written in language that appears blunt or direct. You are encouraged to contact your medical provider and/or [facility] Patient Experience if you have any questions about this medical document.

## (undated) NOTE — LETTER
3/8/2023          To Whom It May Concern:    Anne Espinoza is currently under my medical care and may return to work on 4/12/2023 with the following restrictions: please allow to sit for 10 minutes every hour ; limit walking during work. This restrictions are going to be in for 1 month after returning to work. If you require additional information please contact our office.         Sincerely,    Manuel Ferrari, DO

## (undated) NOTE — LETTER
2/6/2018          To Whom It May Concern:    Anne Chiu Librado is currently under my medical care and may return to work at this time starting 2/8/18. Activity is restricted as follows: none.     If you require additional information please contact our

## (undated) NOTE — LETTER
4/14/2021              Anne Walter        [address]        [place] [address]         To Whom it may concern: This is to certify that Kvng Curtis had an appointment on 4/14/2021 at 12:15 PM with Jamal Forrest MD.        Georgia Joyner

## (undated) NOTE — LETTER
1/25/2023          To Whom It May Concern:    Anne Dugan is currently under my medical care and may not return to work until March 9th 2023 due to her non weight bearing status after surgery. If you require additional information please contact our office.         Sincerely,    Darlyn Mcqueen, DO

## (undated) NOTE — ED AVS SNAPSHOT
Ted Araujo   MRN: [id number]    Department:  [facility] Emergency Department   Date of Visit:  11/11/2017           Disclosure     Insurance plans vary and the physician(s) referred by the ER may not be covered by your plan.  Please contac CARE PHYSICIAN AT ONCE OR RETURN IMMEDIATELY TO THE EMERGENCY DEPARTMENT. If you have been prescribed any medication(s), please fill your prescription right away and begin taking the medication(s) as directed.   If you believe that any of the medications

## (undated) NOTE — LETTER
Date & Time: 12/22/2021, 7:23 PM  Patient: Joellen Levine  Encounter Provider(s):    SHEBA Odell       To Whom It May Concern:    Minor Taveras was seen and treated in our department on 12/22/2021.  She should not return to work until 1/2/2

## (undated) NOTE — ED AVS SNAPSHOT
Rayne Goldberg   MRN: [id number]    Department:  [facility] Emergency Department   Date of Visit:  3/19/2020           Disclosure     Insurance plans vary and the physician(s) referred by the ER may not be covered by your plan.  Please conta CARE PHYSICIAN AT ONCE OR RETURN IMMEDIATELY TO THE EMERGENCY DEPARTMENT. If you have been prescribed any medication(s), please fill your prescription right away and begin taking the medication(s) as directed.   If you believe that any of the medications

## (undated) NOTE — LETTER
Date & Time: 3/31/2024, 2:22 PM  Patient: Anne Walter  Encounter Provider(s):    Alena Mcarthur APRN       To Whom It May Concern:    Anne Walter was seen and treated in our department on 3/31/2024. She can return to work on Wednesday April 3, 2024.    If you have any questions or concerns, please do not hesitate to call.      Nasreen CARABALLO   _____________________________  Registered Nurse

## (undated) NOTE — LETTER
6/7/2023          To Whom It May Concern:    Anen Dugan is currently under my medical care. Please excuse Anne from work through 6/14/23. She is to have post-operative appointment for her right ankle on 6/14/23 and her work restrictions will be reassessed on that day. If you require additional information please contact our office.         Sincerely,    Darlyn Mcqueen, DO

## (undated) NOTE — LETTER
9/25/2024              Anne Walter        [address]        [facility] [address]         To Whom it may concern:    This is to certify that Anne Walter had an appointment on 9/25/2024 at 2:30 pm with Kamla Orellana DO.        Sincerely,    Kamla Orellana DO  [address] [id number] [phone]

## (undated) NOTE — LETTER
Date & Time: 3/19/2020, 2:50 PM  Patient: Diannah Primrose  Encounter Provider(s):    Parker Cullen       To Whom It May Concern:    Latrice Carvajal was seen and treated in our department on 3/19/2020. She can return to work.     If you have any

## (undated) NOTE — LETTER
Date & Time: 8/25/2022, 1:53 AM  Patient: Juni Wilder  Encounter Provider(s):    SHEBA Ross       To Whom It May Concern:    Emil Haines was seen and treated in our department on 8/24/2022. She should not return to work until 8/26/2022.     If you have any questions or concerns, please do not hesitate to call.        _____________________________  Physician/APC Signature

## (undated) NOTE — LETTER
[facility] [address] [address]  Dept: [phone]  Dept Fax: [phone]: [phone]         November 24, 2022    Patient: Hemal Echavarria   YOB: 2000   Date of Visit: 11/24/2022       To Whom It May Concern:    Cathleen Solares was seen and treated in our Immediate Care department on 11/24/2022. She may return to work on 11/27/22. If you have any questions or concerns, please don't hesitate to call.       Encounter Provider(s):    Kavya Amin, SHEBA     2:05 PM  11/24/2022

## (undated) NOTE — LETTER
Date & Time: 8/4/2021, 3:26 PM  Patient: Bud Butt  Encounter Provider(s):    SHEBA Waytt       To Whom It May Concern:    Salena Frey was seen and treated in our department on 8/4/2021. She should not return to work until 8/9/21.

## (undated) NOTE — LETTER
November 11, 2017    Patient: Jani Nolan   Date of Visit: 11/11/2017       To Whom It May Concern:    Jani Nolan was seen and treated in our emergency department on 11/11/2017. She should not return to work until 11/13/2017.     If you have

## (undated) NOTE — LETTER
Date & Time: 7/19/2024, 9:11 AM  Patient: Anne Walter  Encounter Provider(s):    Sandi uGerin APRN       To Whom It May Concern:    Anne Walter was seen and treated in our department on 7/19/2024. She should not return to work until 07/22/2024 .    If you have any questions or concerns, please do not hesitate to call.    DIEGO Price    _____________________________  Physician/APC Signature

## (undated) NOTE — LETTER
Date & Time: 10/25/2019, 12:02 PM  Patient: Kvng Curtis  Encounter Provider(s):    Nico Mcgowan MD       To Whom It May Concern:    Clive Maharaj was seen and treated in our department on 10/25/2019. She can return to work on 10/27/19.

## (undated) NOTE — LETTER
[facility]   Date:   2/8/2024     Name:   Anne Walter    YOB: 2000   MRN:   [id number]       WHERE IS YOUR PAIN NOW?  Abdelrahman the areas on your body where you feel the described sensations.  Use the appropriate symbol.  Abdelrahman the areas of radiation.  Include all affected areas.  Just to complete the picture, please draw in the face.     ACHE:  ^ ^ ^   NUMBNESS:  0000   PINS & NEEDLES:  = = = =                              ^ ^ ^                       0000              = = = =                                    ^ ^ ^                       0000            = = = =      BURNING:  XXXX   STABBING: ////                  XXXX                ////                         XXXX          ////     Please abdelrahman the line below indicating your degree of pain right now  with 0 being no pain 10 being the worst pain possible.                                         0             1             2              3             4              5              6              7             8             9             10         Patient Signature:

## (undated) NOTE — LETTER
Date & Time: 1/30/2025, 5:33 PM  Patient: Anne Walter  Encounter Provider(s):    Juan Diego Meraz PA       To Whom It May Concern:    Anne Walter was seen and treated in our department on 1/30/2025. She should not return to work until Monday  .    If you have any questions or concerns, please do not hesitate to call.      JUAN DIEGO MERAZ   _____________________________  Physician/APC Signature

## (undated) NOTE — LETTER
Date & Time: 11/4/2019, 5:23 PM  Patient: Bud Butt  Encounter Provider(s):    Crystal Rodriguez MD       To Whom It May Concern:    Salena Frey was seen and treated in our department on 11/4/2019.  She should not return to work until 6

## (undated) NOTE — LETTER
Date & Time: 10/6/2020, 10:45 PM  Patient: Gustavo Rodríguez  Encounter Provider(s):    Parker Barber       To Whom It May Concern:    Joanna Barcenas was seen and treated in our department on 10/6/2020. She may return to work on 10/12/2020.     I

## (undated) NOTE — LETTER
8/24/2022          To Whom It May Concern:    Anne Quezada is currently under my medical care and may return to work with no restrictions. If you require additional information please contact our office.         Sincerely,    Tana Gonzales MD

## (undated) NOTE — LETTER
Marshal Holliday, [address]  [place],  [place]       08/27/21        Patient: Jose Francisco Rivas   YOB: 2000   Date of Visit: 8/27/2021       Dear  Dr. Sharlet Boxer, MD,      Thank you for referring Jsoe Francisco Rivas to my practice.

## (undated) NOTE — LETTER
Date & Time: 10/4/2020, 8:35 PM  Patient: Kim Gins  Encounter Provider(s):    Austin Chavis MD       To Whom It May Concern:    Nona Burroughs was seen and treated in our department on 10/4/2020.  She should not return to work until 10/6/

## (undated) NOTE — LETTER
Date & Time: 5/19/2021, 11:28 AM  Patient: Valentino Brooks  Encounter Provider(s):    SHEBA Nunez       To Whom It May Concern:    Mini Mendenhall was seen and treated in our department on 5/19/2021. She can return to work May 20, 2021.

## (undated) NOTE — LETTER
1/4/2023          To Whom It May Concern:    Anne Croft is currently under my medical care. She is scheduled for right ankle surgery on 1/9/23. She will be off of work for 6 weeks after surgery. If you require additional information please contact our office.         Sincerely,    Froilan Dasilva, DO

## (undated) NOTE — LETTER
Date & Time: 3/15/2022, 11:32 PM  Patient: Carmen Rape  Encounter Provider(s):    Maribeth Paul MD       To Whom It May Concern:    Cyndee Lira was seen and treated in our department on 3/15/2022. She should not return to work until 3/18/2022.     If you have any questions or concerns, please do not hesitate to call.        _____________________________  Physician/APC Signature

## (undated) NOTE — MR AVS SNAPSHOT
[address]  [address] [facility]  [phone]               Thank you for choosing us for your health care visit with Claudell Bright. Ayub, MD.  We are glad to serve you and happy to provide you with this summary DIETITIAN EDUCATION INITIAL, DIET (INTERNAL)    Complete by:  As directed    Assoc Dx:   Morbid obesity due to excess calories ([facility] 75.) [E66.01]                 Referral Details     Referred By    Referred To    Noris Rodriguez MD   Doctor [address] No Known Allergies                Today's Vital Signs     BP Pulse    119/71 mmHg (77 %, Z = 0.73 / 69 %, Z = 0.51*) 79    Temp Height    98.6 °F (37 °C) (Oral) 5' 5\" (1.651 m) (61 %*, Z = 0.29)    Weight BMI    304 lb (137.893 kg) (100 %*, Z = 2.77) 50. Get your heart pumping – brisk walking, biking, swimming Even 10 minute increments are effective and add up over the week   2 ½ hours per week – spread out over time Use a eamon to keep you motivated   Don’t forget strength training with weights and resist

## (undated) NOTE — LETTER
Date & Time: 9/3/2020, 6:34 PM  Patient: Kvng Curtis  Encounter Provider(s):    Channing Abbott MD       To Whom It May Concern:    Clive Maharaj was seen and treated in our department on 9/3/2020.  She should not return to work until September

## (undated) NOTE — LETTER
3/4/2024          To Whom It May Concern:    Anne Walter is currently under my medical care. She may return to work on 3/27/24 with no restrictions.    If you require additional information please contact our office.        Sincerely,    Kamla Orellana, DO

## (undated) NOTE — LETTER
8/22/2024          To Whom It May Concern:    Anne Walter is currently under my medical care and may not return to work at this time.    Please excuse Anne for 1 days.  She may return to work on 8/23/24.  Activity is restricted as follows: none.    If you require additional information please contact our office.        Sincerely,    Azeem Johnston MD

## (undated) NOTE — LETTER
ASTHMA ACTION PLAN for Anne Garcia     : 2000          Date: 3/15/2021    Jamal Perez MD  [place] [id number] [phone] 1.   [employer] GO!  % Personal Best Peak patient. Physician Patient Caretaker (if necessary)   Lillian Lund.  MD Anne Maddox

## (undated) NOTE — LETTER
6/14/2023          To Whom It May Concern:    Anne Jeter is currently under my medical care and may not return to work for the next 6 weeks. If you require additional information please contact our office.         Sincerely,    Negrito Echavarria, DO

## (undated) NOTE — LETTER
Date & Time: 11/1/2022, 10:27 AM  Patient: Camila Torres  Encounter Provider(s):    SHEBA Schmitt       To Whom It May Concern:    Patricia Rothman was seen and treated in our department on 11/1/2022. She should not return to work until 11/3/22.     If you have any questions or concerns, please do not hesitate to call.        _____________________________  Physician/APC Signature

## (undated) NOTE — LETTER
11/29/2022          To Whom It May Concern:    Anne Cline is currently under my medical care. She was last seen in my office on 11/23/22. She is scheduled for right ankle surgery on 1/9/23. If you require additional information please contact our office.         Sincerely,    Carmen Orlando, DO

## (undated) NOTE — LETTER
Date & Time: 1/2/2025, 7:05 PM  Patient: Anne Walter  Encounter Provider(s):    Cyndee Nye APRN       To Whom It May Concern:    Anne Walter was seen and treated in our department on 1/2/2025. She  should be excused from work thru 1/3/2025 .    If you have any questions or concerns, please do not hesitate to call.        _____________________________  Physician/APC Signature